# Patient Record
Sex: MALE | Race: WHITE | NOT HISPANIC OR LATINO | Employment: OTHER | URBAN - METROPOLITAN AREA
[De-identification: names, ages, dates, MRNs, and addresses within clinical notes are randomized per-mention and may not be internally consistent; named-entity substitution may affect disease eponyms.]

---

## 2018-08-30 ENCOUNTER — HOSPITAL ENCOUNTER (OUTPATIENT)
Dept: GENERAL RADIOLOGY | Facility: HOSPITAL | Age: 58
Discharge: HOME | End: 2018-08-30

## 2019-05-16 ENCOUNTER — CONVERSION ENCOUNTER (OUTPATIENT)
Dept: BEHAVIORAL HEALTH | Facility: OTHER | Age: 59
End: 2019-05-16

## 2019-05-16 LAB
ALBUMIN SERPL-MCNC: 4.2 G/DL (ref 3.6–5.1)
ALBUMIN/GLOB SERPL: ABNORMAL {RATIO} (ref 1–2.5)
ALP SERPL-CCNC: 78 UNITS/L (ref 40–115)
ALT SERPL-CCNC: 25 UNITS/L (ref 9–46)
AST SERPL-CCNC: 21 UNITS/L (ref 10–35)
BASOPHILS # BLD AUTO: ABNORMAL 10*3/MM3 (ref 0–200)
BASOPHILS NFR BLD AUTO: 1.4 %
BILIRUB SERPL-MCNC: 0.4 MG/DL (ref 0.2–1.2)
BILIRUB UR QL STRIP: NEGATIVE
BUN SERPL-MCNC: 21 MG/DL (ref 7–25)
BUN/CREAT SERPL: ABNORMAL (ref 6–22)
CALCIUM SERPL-MCNC: 9.4 MG/DL (ref 8.6–10.3)
CHLORIDE SERPL-SCNC: 98 MMOL/L (ref 98–110)
CHOLEST SERPL-MCNC: 228 MG/DL
CHOLEST/HDLC SERPL: ABNORMAL {RATIO}
CO2 CONTENT VENOUS: 28 MMOL/L (ref 20–32)
COLOR UR: YELLOW
CONV BACTERIA IN URINE MICRO: ABNORMAL /HPF
CONV HYALINE CASTS IN URINE MICRO: ABNORMAL
CONV NEUTROPHILS/100 LEUKOCYTES IN BODY FLUID BY MANUAL COUNT: 65.7 %
CONV PROTEIN IN URINE BY AUTOMATED TEST STRIP: NEGATIVE
CONV TOTAL PROTEIN: 7 G/DL (ref 6.1–8.1)
CREAT UR-MCNC: 1.3 MG/DL (ref 0.7–1.25)
EOSINOPHIL # BLD AUTO: 3.9 %
EOSINOPHIL # BLD AUTO: ABNORMAL 10*3/MM3 (ref 15–500)
ERYTHROCYTE [DISTWIDTH] IN BLOOD BY AUTOMATED COUNT: 12.8 % (ref 11–15)
GLOBULIN UR ELPH-MCNC: ABNORMAL G/DL (ref 1.9–3.7)
GLUCOSE SERPL-MCNC: 87 MG/DL (ref 65–99)
GLUCOSE UR QL: NEGATIVE G/DL
HCT VFR BLD AUTO: 36.9 % (ref 38.5–50)
HDLC SERPL-MCNC: 66 MG/DL
HGB BLD-MCNC: 12.6 G/DL (ref 13.2–17.1)
HGB UR QL STRIP: ABNORMAL
KETONES UR QL STRIP: NEGATIVE
LDLC SERPL CALC-MCNC: ABNORMAL MG/DL
LEUKOCYTE ESTERASE UR QL STRIP: ABNORMAL
LYMPHOCYTES # BLD AUTO: ABNORMAL 10*3/MM3 (ref 850–3900)
LYMPHOCYTES NFR BLD AUTO: 18.4 %
MCH RBC QN AUTO: 32.8 PG (ref 27–33)
MCHC RBC AUTO-ENTMCNC: ABNORMAL % (ref 32–36)
MCV RBC AUTO: 96.1 FL (ref 80–100)
MONOCYTES # BLD AUTO: ABNORMAL 10*3/MICROLITER (ref 200–950)
MONOCYTES NFR BLD AUTO: 10.6 %
NEUTROPHILS # BLD AUTO: ABNORMAL 10*3/MM3 (ref 1500–7800)
NITRITE UR QL STRIP: NEGATIVE
PH UR STRIP.AUTO: 7 [PH] (ref 5–8)
PHENOBARB SERPL-MCNC: 22.4 MCG/ML (ref 15–40)
PLATELET # BLD AUTO: ABNORMAL 10*3/MM3 (ref 140–400)
PMV BLD AUTO: 10.2 FL (ref 7.5–12.5)
POTASSIUM SERPL-SCNC: 4 MMOL/L (ref 3.5–5.3)
RBC # BLD AUTO: ABNORMAL 10*6/MM3 (ref 4.2–5.8)
RBC #/AREA URNS HPF: ABNORMAL /[HPF]
SODIUM SERPL-SCNC: 134 MMOL/L (ref 135–146)
SP GR UR: 1.01 (ref 1–1.03)
SQUAMOUS #/AREA URNS HPF: ABNORMAL /HPF
TRIGL SERPL-MCNC: 131 MG/DL
WBC # BLD AUTO: ABNORMAL K/UL (ref 3.8–10.8)
WBC #/AREA URNS HPF: ABNORMAL CELLS/HPF

## 2019-07-01 RX ORDER — ACETAMINOPHEN 325 MG/1
TABLET ORAL
Qty: 60 TABLET | Refills: 12 | Status: SHIPPED | OUTPATIENT
Start: 2019-07-01 | End: 2021-02-08

## 2019-07-01 RX ORDER — OMEPRAZOLE 20 MG/1
CAPSULE, DELAYED RELEASE ORAL
Qty: 30 CAPSULE | Refills: 0 | Status: SHIPPED | OUTPATIENT
Start: 2019-07-01

## 2019-07-20 RX ORDER — RANITIDINE HCL 75 MG
TABLET ORAL
Qty: 60 TABLET | Refills: 2 | Status: SHIPPED | OUTPATIENT
Start: 2019-07-20 | End: 2019-10-15 | Stop reason: SDUPTHER

## 2019-07-20 RX ORDER — HYDRALAZINE HYDROCHLORIDE 10 MG/1
TABLET, FILM COATED ORAL
Qty: 90 TABLET | Refills: 2 | Status: SHIPPED | OUTPATIENT
Start: 2019-07-20 | End: 2019-09-25 | Stop reason: SDUPTHER

## 2019-07-20 RX ORDER — LORATADINE 10 MG/1
TABLET ORAL
Qty: 30 TABLET | Refills: 0 | Status: SHIPPED | OUTPATIENT
Start: 2019-07-20 | End: 2019-08-16 | Stop reason: SDUPTHER

## 2019-07-20 RX ORDER — FERROUS SULFATE 325(65) MG
TABLET ORAL
Qty: 60 TABLET | Refills: 0 | Status: SHIPPED | OUTPATIENT
Start: 2019-07-20 | End: 2019-08-16 | Stop reason: SDUPTHER

## 2019-08-05 ENCOUNTER — OFFICE VISIT (OUTPATIENT)
Dept: FAMILY MEDICINE CLINIC | Facility: CLINIC | Age: 59
End: 2019-08-05

## 2019-08-05 VITALS
HEART RATE: 80 BPM | HEIGHT: 68 IN | DIASTOLIC BLOOD PRESSURE: 72 MMHG | BODY MASS INDEX: 22.43 KG/M2 | OXYGEN SATURATION: 96 % | TEMPERATURE: 98 F | SYSTOLIC BLOOD PRESSURE: 116 MMHG | WEIGHT: 148 LBS

## 2019-08-05 DIAGNOSIS — R63.4 WEIGHT LOSS, NON-INTENTIONAL: ICD-10-CM

## 2019-08-05 DIAGNOSIS — D63.1 ANEMIA DUE TO CHRONIC KIDNEY DISEASE, UNSPECIFIED CKD STAGE: ICD-10-CM

## 2019-08-05 DIAGNOSIS — F20.9 SCHIZOPHRENIA, UNSPECIFIED TYPE (HCC): ICD-10-CM

## 2019-08-05 DIAGNOSIS — K27.9 PEPTIC ULCER DISEASE: ICD-10-CM

## 2019-08-05 DIAGNOSIS — R79.89 SERUM CREATININE RAISED: Primary | ICD-10-CM

## 2019-08-05 DIAGNOSIS — N18.9 ANEMIA DUE TO CHRONIC KIDNEY DISEASE, UNSPECIFIED CKD STAGE: ICD-10-CM

## 2019-08-05 DIAGNOSIS — M81.0 OSTEOPOROSIS, UNSPECIFIED OSTEOPOROSIS TYPE, UNSPECIFIED PATHOLOGICAL FRACTURE PRESENCE: ICD-10-CM

## 2019-08-05 DIAGNOSIS — G40.909 SEIZURE DISORDER (HCC): ICD-10-CM

## 2019-08-05 PROBLEM — Z13.220 ENCOUNTER FOR LIPID SCREENING FOR CARDIOVASCULAR DISEASE: Status: ACTIVE | Noted: 2017-11-22

## 2019-08-05 PROBLEM — Z13.6 ENCOUNTER FOR LIPID SCREENING FOR CARDIOVASCULAR DISEASE: Status: ACTIVE | Noted: 2017-11-22

## 2019-08-05 PROBLEM — Z12.5 ENCOUNTER FOR SCREENING FOR MALIGNANT NEOPLASM OF PROSTATE: Status: ACTIVE | Noted: 2017-11-22

## 2019-08-05 PROBLEM — Z51.81 THERAPEUTIC DRUG MONITORING: Status: ACTIVE | Noted: 2019-05-14

## 2019-08-05 PROBLEM — E87.1 HYPONATREMIA: Status: ACTIVE | Noted: 2017-03-15

## 2019-08-05 PROBLEM — Z88.9: Status: ACTIVE | Noted: 2017-07-14

## 2019-08-05 PROBLEM — Z00.00 ENCOUNTER FOR GENERAL ADULT MEDICAL EXAMINATION WITHOUT ABNORMAL FINDINGS: Status: ACTIVE | Noted: 2019-05-14

## 2019-08-05 PROBLEM — E78.00 HIGH CHOLESTEROL: Status: ACTIVE | Noted: 2019-05-29

## 2019-08-05 PROBLEM — D64.9 ANEMIA: Status: ACTIVE | Noted: 2017-03-15

## 2019-08-05 PROCEDURE — 99214 OFFICE O/P EST MOD 30 MIN: CPT | Performed by: NURSE PRACTITIONER

## 2019-08-05 RX ORDER — RISPERIDONE 2 MG/1
1 TABLET ORAL 2 TIMES DAILY
COMMUNITY
Start: 2018-09-18 | End: 2019-11-11 | Stop reason: SDUPTHER

## 2019-08-05 RX ORDER — DOCUSATE SODIUM 100 MG/1
1 CAPSULE, LIQUID FILLED ORAL 2 TIMES DAILY
COMMUNITY
Start: 2018-06-22 | End: 2019-09-13 | Stop reason: SDUPTHER

## 2019-08-05 RX ORDER — ALENDRONATE SODIUM 70 MG/1
1 TABLET ORAL WEEKLY
COMMUNITY
Start: 2019-03-01 | End: 2019-09-13 | Stop reason: SDUPTHER

## 2019-08-05 RX ORDER — BRIMONIDINE TARTRATE AND TIMOLOL MALEATE 2; 5 MG/ML; MG/ML
1 SOLUTION OPHTHALMIC 2 TIMES DAILY
COMMUNITY
Start: 2017-02-24

## 2019-08-05 RX ORDER — PHENOBARBITAL 97.2 MG/1
1 TABLET ORAL DAILY
COMMUNITY
Start: 2014-12-13 | End: 2020-02-04

## 2019-08-05 RX ORDER — ACETAMINOPHEN 325 MG/1
2 TABLET ORAL EVERY 4 HOURS PRN
COMMUNITY
Start: 2011-11-07 | End: 2022-12-13

## 2019-08-05 RX ORDER — OXCARBAZEPINE 600 MG/1
TABLET, FILM COATED ORAL
COMMUNITY
Start: 2019-01-05 | End: 2020-01-07

## 2019-08-05 NOTE — PROGRESS NOTES
Subjective   Manuel Gaspar is a 59 y.o. male.     59-year-old white male recounts male who lives in a group home with a history of hypertension, kidney stones, chronic anemia, neurogenic bladder, hearing loss with hearing aids, chronic hyponatremia, chronic UTIs and seizure disorder who comes in today for 3-month follow-up visit.  Patient has no real issues and has been doing well.  Blood pressure 116/72 heart rate 80 he denies any chest pain, dyspnea, tachycardia, dizziness or edema  Weight is 148 which is down 3 lb and we are going to be adding shakes to his diet since he is so active. Patient is facilities  Deny any seizure activity air has been good  Patient is on Fosamax and calcium vitamin D but has not had a bone scan in over 10 years and we are going to order this  Last hemoglobin was 12.6 and last creatinine was 1.3 we are checking both today    CBC and BMP today  DEXA scan         The following portions of the patient's history were reviewed and updated as appropriate: allergies, current medications, past family history, past medical history, past social history, past surgical history and problem list.    Review of Systems   Constitutional: Negative.    HENT: Negative.    Respiratory: Negative.    Cardiovascular: Negative.    Gastrointestinal: Negative.    Genitourinary: Negative.    Musculoskeletal: Negative.    Skin: Negative.    Neurological: Negative.    Psychiatric/Behavioral: Negative.        Objective   Physical Exam   Constitutional: He appears well-developed and well-nourished.   Eyes: EOM are normal.   Cardiovascular: Normal rate and regular rhythm.   Pulmonary/Chest: Effort normal and breath sounds normal.   Abdominal: Soft. Bowel sounds are normal.   Musculoskeletal: Normal range of motion.   Neurological: He is alert.   Skin: Skin is warm and dry.   Psychiatric: He has a normal mood and affect.         Assessment/Plan   Manuel was seen today for anemia and hypertension.    Diagnoses and all orders  for this visit:    Serum creatinine raised  -     Basic Metabolic Panel    Anemia due to chronic kidney disease, unspecified CKD stage  -     CBC (No Diff)    Peptic ulcer disease    Seizure disorder (CMS/HCC)    Schizophrenia, unspecified type (CMS/[facility])    Weight loss, non-intentional    Osteoporosis, unspecified osteoporosis type, unspecified pathological fracture presence  -     DEXA Bone Density Axial; Future

## 2019-08-06 LAB
BUN SERPL-MCNC: 18 MG/DL (ref 8–27)
BUN/CREAT SERPL: 13 (ref 10–24)
CALCIUM SERPL-MCNC: 9.4 MG/DL (ref 8.6–10.2)
CHLORIDE SERPL-SCNC: 100 MMOL/L (ref 96–106)
CO2 SERPL-SCNC: 25 MMOL/L (ref 20–29)
CREAT SERPL-MCNC: 1.34 MG/DL (ref 0.76–1.27)
ERYTHROCYTE [DISTWIDTH] IN BLOOD BY AUTOMATED COUNT: 13.4 % (ref 12.3–15.4)
GLUCOSE SERPL-MCNC: 56 MG/DL (ref 65–99)
HCT VFR BLD AUTO: 37.2 % (ref 37.5–51)
HGB BLD-MCNC: 12.6 G/DL (ref 13–17.7)
MCH RBC QN AUTO: 32.2 PG (ref 26.6–33)
MCHC RBC AUTO-ENTMCNC: 33.9 G/DL (ref 31.5–35.7)
MCV RBC AUTO: 95 FL (ref 79–97)
PLATELET # BLD AUTO: 186 X10E3/UL (ref 150–450)
POTASSIUM SERPL-SCNC: 4.2 MMOL/L (ref 3.5–5.2)
RBC # BLD AUTO: 3.91 X10E6/UL (ref 4.14–5.8)
SODIUM SERPL-SCNC: 139 MMOL/L (ref 134–144)
WBC # BLD AUTO: 4.4 X10E3/UL (ref 3.4–10.8)

## 2019-08-16 RX ORDER — PHENOBARBITAL 97.2 MG/1
TABLET ORAL
Qty: 30 TABLET | Refills: 5 | OUTPATIENT
Start: 2019-08-16

## 2019-08-17 RX ORDER — LORATADINE 10 MG/1
TABLET ORAL
Qty: 30 TABLET | Refills: 0 | Status: SHIPPED | OUTPATIENT
Start: 2019-08-17 | End: 2019-09-13 | Stop reason: SDUPTHER

## 2019-08-17 RX ORDER — FERROUS SULFATE 325(65) MG
TABLET ORAL
Qty: 60 TABLET | Refills: 0 | Status: SHIPPED | OUTPATIENT
Start: 2019-08-17 | End: 2019-09-13 | Stop reason: SDUPTHER

## 2019-09-15 RX ORDER — LORATADINE 10 MG/1
TABLET ORAL
Qty: 30 TABLET | Refills: 0 | Status: SHIPPED | OUTPATIENT
Start: 2019-09-15 | End: 2019-10-15 | Stop reason: SDUPTHER

## 2019-09-15 RX ORDER — ALENDRONATE SODIUM 70 MG/1
TABLET ORAL
Qty: 4 TABLET | Refills: 5 | Status: SHIPPED | OUTPATIENT
Start: 2019-09-15 | End: 2020-02-29

## 2019-09-15 RX ORDER — FERROUS SULFATE 325(65) MG
TABLET ORAL
Qty: 60 TABLET | Refills: 0 | Status: SHIPPED | OUTPATIENT
Start: 2019-09-15 | End: 2019-10-15 | Stop reason: SDUPTHER

## 2019-09-15 RX ORDER — DOCUSATE SODIUM 100 MG/1
CAPSULE, LIQUID FILLED ORAL
Qty: 60 CAPSULE | Refills: 4 | Status: SHIPPED | OUTPATIENT
Start: 2019-09-15 | End: 2020-02-04

## 2019-09-25 RX ORDER — HYDRALAZINE HYDROCHLORIDE 10 MG/1
10 TABLET, FILM COATED ORAL 2 TIMES DAILY
Qty: 60 TABLET | Refills: 5 | Status: SHIPPED | OUTPATIENT
Start: 2019-09-25 | End: 2020-01-07

## 2019-10-16 RX ORDER — RANITIDINE HCL 75 MG
TABLET ORAL
Qty: 60 TABLET | Refills: 2 | Status: SHIPPED | OUTPATIENT
Start: 2019-10-16 | End: 2019-11-20

## 2019-10-16 RX ORDER — FERROUS SULFATE 325(65) MG
TABLET ORAL
Qty: 60 TABLET | Refills: 0 | Status: SHIPPED | OUTPATIENT
Start: 2019-10-16 | End: 2019-11-12 | Stop reason: SDUPTHER

## 2019-10-16 RX ORDER — LORATADINE 10 MG/1
TABLET ORAL
Qty: 30 TABLET | Refills: 0 | Status: SHIPPED | OUTPATIENT
Start: 2019-10-16 | End: 2019-11-12 | Stop reason: SDUPTHER

## 2019-11-05 DIAGNOSIS — M81.0 OSTEOPOROSIS, UNSPECIFIED OSTEOPOROSIS TYPE, UNSPECIFIED PATHOLOGICAL FRACTURE PRESENCE: ICD-10-CM

## 2019-11-11 ENCOUNTER — OFFICE VISIT (OUTPATIENT)
Dept: PSYCHIATRY | Facility: CLINIC | Age: 59
End: 2019-11-11

## 2019-11-11 DIAGNOSIS — F20.0 PARANOID SCHIZOPHRENIA (HCC): Primary | ICD-10-CM

## 2019-11-11 DIAGNOSIS — F79 INTELLECTUAL DISABILITY: ICD-10-CM

## 2019-11-11 PROCEDURE — 99213 OFFICE O/P EST LOW 20 MIN: CPT | Performed by: PHYSICIAN ASSISTANT

## 2019-11-11 RX ORDER — RISPERIDONE 2 MG/1
2 TABLET ORAL 2 TIMES DAILY
Qty: 60 TABLET | Refills: 5 | Status: SHIPPED | OUTPATIENT
Start: 2019-11-11 | End: 2020-05-24

## 2019-11-11 NOTE — PROGRESS NOTES
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  He hopes to save money so he can go visit [facility] in [facility], [place]      The following portions of the patient's history were reviewed and updated as appropriate: allergies, current medications, past family history, past medical history, past social history, past surgical history and problem list.    PAST PSYCHIATRIC HISTORY  Axis I  Schizophrenia/cognitive disorder  Axis II  Learning Disorder    PAST OUTPATIENT TREATMENT  Diagnosis treated:  Cognitive Disorder  Treatment Type:  Medication Management  Prior Psychiatric Medications:  Risperdal  Support Groups:  None  Sequelae Of Mental Disorder:  emotional distress      Interval History  No Change    Side Effects  None      Past Medical History:  Past Medical History:   Diagnosis Date   • Hearing loss    • Hypertension    • Kidney stone    • Mental retardation    • Neurogenic bladder    • Seizure disorder (CMS/HCC)     onset 16-17 years old, last seizure about 2015 when weaning phenobarb       Social History:  Social History     Socioeconomic History   • Marital status: Single     Spouse name: Not on file   • Number of children: Not on file   • Years of education: Not on file   • Highest education level: Not on file   Tobacco Use   • Smoking status: Never Smoker   • Smokeless tobacco: Never Used   Substance and Sexual Activity   • Alcohol use: No     Frequency: Never   • Drug use: No   • Sexual activity: No       Family History:  Family History   Family history unknown: Yes       Past Surgical History:  Past Surgical History:    Procedure Laterality Date   • BLADDER REPAIR     • CATARACT EXTRACTION     • ORIF PATELLA FRACTURE Left    • THORACOTOMY     • TYMPANOMASTOIDECTOMY         Problem List:  Patient Active Problem List   Diagnosis   • Allergy status to unspecified drugs, medicaments and biological substances status   • Anemia   • Constipation   • Deafness   • Difficult or painful urination   • Dysthymia   • Encounter for lipid screening for cardiovascular disease   • Encounter for screening   • Encounter for screening for malignant neoplasm of prostate   • Encounter for immunization   • Therapeutic drug monitoring   • Encounter for immunization   • Encounter for general adult medical examination without abnormal findings   • Enlarged prostate without lower urinary tract symptoms (luts)   • Generalized anxiety disorder   • Headache   • High cholesterol   • Hypertension, benign   • Hyponatremia   • Insomnia   • Knee pain   • Intellectual disability   • Nephrolithiasis   • Otalgia   • Otitis media   • Partial epilepsy with impairment of consciousness, intractable (CMS/HCC)   • Peptic ulcer disease   • Schizophrenia (CMS/HCC)   • Seizure disorder (CMS/HCC)   • Serum creatinine raised   • Weight loss, non-intentional       Allergy:   Allergies   Allergen Reactions   • Levofloxacin Other (See Comments)   • Sulfamethoxazole-Trimethoprim Other (See Comments)        Discontinued Medications:  Medications Discontinued During This Encounter   Medication Reason   • risperiDONE (RISPERDAL) 2 MG tablet Reorder       Current Medications:   Current Outpatient Medications   Medication Sig Dispense Refill   • acetaminophen (TYLENOL) 325 MG tablet Take 2 tablets by mouth Every 4 (Four) Hours As Needed.     • alendronate (FOSAMAX) 70 MG tablet TAKE 1 TABLET BY MOUTH WEEKLY .TAKE WITH 8OZ WATER BEFORE ANY FOOD OR DRINK. DO NOT L IE DOWN FOR 30 MINUTES 4 tablet 5   • brimonidine-timolol (COMBIGAN) 0.2-0.5 % ophthalmic solution Administer 1 drop to both  eyes 2 (Two) Times a Day.     • calcium carbonate-vitamin d 600-400 MG-UNIT per tablet TAKE ONE (1) TABLET BY MOUTH TWICE A DAY 60 tablet 3   • DOCUSIL 100 MG capsule TAKE ONE (1) CAPSULE BY MOUTH TWICE A DAY 60 capsule 4   • ferrous sulfate 325 (65 FE) MG tablet TAKE ONE TABLET BY MOUTH TWICE DAILY 60 tablet 0   • hydrALAZINE (APRESOLINE) 10 MG tablet Take 1 tablet by mouth 2 (Two) Times a Day. Morning and Evening 60 tablet 5   • loratadine (CLARITIN) 10 MG tablet TAKE ONE TABLET BY MOUTH EVERY DAY 30 tablet 0   • MAPAP 325 MG tablet TAKE 2 TABLETS BY MOUTH EVERY 4 HOURS AS NEEDED FOR FEVER AND PAIN ( MAX 12 DOSES IN 24 HOURS) 60 tablet 12   • Neomycin-Bacitracin-Polymyxin (GNP TRIPLE ANTIBIOTIC) 3.5-400-5000 ointment FOLLOW DIRECTIONS ON LABEL AS NEEDED TO PREVENT INFECTIONS IN MINOR CUTS AND SCRAPES. IF NO IMPROVEMENT IN 48 HOURS NOTIFY NURSE 28.4 g 12   • omeprazole (priLOSEC) 20 MG capsule TAKE 1 CAPSULE BY MOUTH EVERY DAY AS NEEDED ( BUBBLE ) 30 capsule 0   • OXcarbazepine (TRILEPTAL) 600 MG tablet Take 1 tablet by mouth every morning and 2 tablets by mouth every evening     • PHENobarbital (LUMINAL) 97.2 MG tablet Take 1 tablet by mouth Daily.     • Polyethylene Glycol 3350 (MIRALAX PO) Take 17 g by mouth Daily. Mix with water     • Psyllium (METAMUCIL MULTIHEALTH FIBER) 55.46 % powder Take 1 teaspoon(s) by mouth 2 (Two) Times a Day.     • raNITIdine (ZANTAC) 75 MG tablet TAKE ONE TABLET BY MOUTH TWICE DAILY 60 tablet 2   • risperiDONE (RISPERDAL) 2 MG tablet Take 1 tablet by mouth 2 (Two) Times a Day. 60 tablet 5     No current facility-administered medications for this visit.          Review of Symptoms:    Psychiatric/Behavioral: Negative for agitation, behavioral problems, confusion, decreased concentration, dysphoric mood, hallucinations, self-injury, sleep disturbance and suicidal ideas. The patient is not nervous/anxious and is not hyperactive.        Physical Exam:   There were no vitals taken for  this visit.    Mental Status Exam:   Hygiene:   good  Cooperation:  Cooperative  Eye Contact:  Good  Psychomotor Behavior:  Slow  Affect:  Appropriate  Mood: normal  Hopelessness: Denies  Speech:  Normal  Thought Process:  Goal directed  Thought Content:  Normal  Suicidal:  None  Homicidal:  None  Hallucinations:  None  Delusion:  None  Memory:  Deficits  Orientation:  Person, Place, Time and Situation  Reliability:  fair  Insight:  Fair  Judgement:  Fair  Impulse Control:  Good  Physical/Medical Issues:  No        PHQ-9 Depression Screening  Little interest or pleasure in doing things? 0   Feeling down, depressed, or hopeless? 0   Trouble falling or staying asleep, or sleeping too much?     Feeling tired or having little energy?     Poor appetite or overeating?     Feeling bad about yourself - or that you are a failure or have let yourself or your family down?     Trouble concentrating on things, such as reading the newspaper or watching television?     Moving or speaking so slowly that other people could have noticed? Or the opposite - being so fidgety or restless that you have been moving around a lot more than usual?     Thoughts that you would be better off dead, or of hurting yourself in some way?     PHQ-9 Total Score 0   If you checked off any problems, how difficult have these problems made it for you to do your work, take care of things at home, or get along with other people?             Never smoker    I advised Ray of the risks of tobacco use.     Lab Results:   No visits with results within 3 Month(s) from this visit.   Latest known visit with results is:   Office Visit on 08/05/2019   Component Date Value Ref Range Status   • Glucose 08/05/2019 56* 65 - 99 mg/dL Final   • BUN 08/05/2019 18  8 - 27 mg/dL Final   • Creatinine 08/05/2019 1.34* 0.76 - 1.27 mg/dL Final   • eGFR Non African Am 08/05/2019 57* >59 mL/min/1.73 Final   • eGFR African Am 08/05/2019 66  >59 mL/min/1.73 Final   • BUN/Creatinine  Ratio 08/05/2019 13  10 - 24 Final   • Sodium 08/05/2019 139  134 - 144 mmol/L Final   • Potassium 08/05/2019 4.2  3.5 - 5.2 mmol/L Final   • Chloride 08/05/2019 100  96 - 106 mmol/L Final   • Total CO2 08/05/2019 25  20 - 29 mmol/L Final   • Calcium 08/05/2019 9.4  8.6 - 10.2 mg/dL Final   • WBC 08/05/2019 4.4  3.4 - 10.8 x10E3/uL Final   • RBC 08/05/2019 3.91* 4.14 - 5.80 x10E6/uL Final   • Hemoglobin 08/05/2019 12.6* 13.0 - 17.7 g/dL Final   • Hematocrit 08/05/2019 37.2* 37.5 - 51.0 % Final   • MCV 08/05/2019 95  79 - 97 fL Final   • MCH 08/05/2019 32.2  26.6 - 33.0 pg Final   • MCHC 08/05/2019 33.9  31.5 - 35.7 g/dL Final   • RDW 08/05/2019 13.4  12.3 - 15.4 % Final   • Platelets 08/05/2019 186  150 - 450 x10E3/uL Final       Assessment/Plan   Problems Addressed this Visit        Other    Intellectual disability    Relevant Medications    risperiDONE (RISPERDAL) 2 MG tablet    Schizophrenia (CMS/[facility]) - Primary    Relevant Medications    risperiDONE (RISPERDAL) 2 MG tablet          Visit Diagnoses:    ICD-10-CM ICD-9-CM   1. Paranoid schizophrenia (CMS/[facility]) F20.0 295.30   2. Intellectual disability F79 319       TREATMENT PLAN/GOALS: Continue supportive psychotherapy efforts and medications as indicated. Treatment and medication options discussed during today's visit. Patient ackowledged and verbally consented to continue with current treatment plan and was educated on the importance of compliance with treatment and follow-up appointments.    MEDICATION ISSUES:  INSPECT reviewed as expected  Discussed medication options and treatment plan of prescribed medication as well as the risks, benefits, and side effects including potential falls, possible impaired driving and metabolic adversities among others. Patient is agreeable to call the office with any worsening of symptoms or onset of side effects. Patient is agreeable to call 911 or go to the nearest ER should he/she begin having SI/HI. No medication side effects  or related complaints today.     MEDS ORDERED DURING VISIT:  New Medications Ordered This Visit   Medications   • risperiDONE (RISPERDAL) 2 MG tablet     Sig: Take 1 tablet by mouth 2 (Two) Times a Day.     Dispense:  60 tablet     Refill:  5       Return in about 6 months (around 5/11/2020).         This document has been electronically signed by Krista Epstein PA-C  November 11, 2019 10:33 AM

## 2019-11-12 ENCOUNTER — OFFICE VISIT (OUTPATIENT)
Dept: FAMILY MEDICINE CLINIC | Facility: CLINIC | Age: 59
End: 2019-11-12

## 2019-11-12 VITALS
HEIGHT: 68 IN | BODY MASS INDEX: 23.04 KG/M2 | TEMPERATURE: 97.8 F | HEART RATE: 78 BPM | WEIGHT: 152 LBS | SYSTOLIC BLOOD PRESSURE: 106 MMHG | DIASTOLIC BLOOD PRESSURE: 68 MMHG | OXYGEN SATURATION: 98 %

## 2019-11-12 DIAGNOSIS — R79.89 SERUM CREATININE RAISED: ICD-10-CM

## 2019-11-12 DIAGNOSIS — N18.9 ANEMIA DUE TO CHRONIC KIDNEY DISEASE, UNSPECIFIED CKD STAGE: Primary | ICD-10-CM

## 2019-11-12 DIAGNOSIS — R63.4 WEIGHT LOSS, NON-INTENTIONAL: ICD-10-CM

## 2019-11-12 DIAGNOSIS — D63.1 ANEMIA DUE TO CHRONIC KIDNEY DISEASE, UNSPECIFIED CKD STAGE: Primary | ICD-10-CM

## 2019-11-12 PROCEDURE — 99214 OFFICE O/P EST MOD 30 MIN: CPT | Performed by: NURSE PRACTITIONER

## 2019-11-12 RX ORDER — FERROUS SULFATE 325(65) MG
TABLET ORAL
Qty: 60 TABLET | Refills: 0 | Status: SHIPPED | OUTPATIENT
Start: 2019-11-12 | End: 2019-12-10 | Stop reason: SDUPTHER

## 2019-11-12 RX ORDER — LORATADINE 10 MG/1
TABLET ORAL
Qty: 30 TABLET | Refills: 0 | Status: SHIPPED | OUTPATIENT
Start: 2019-11-12 | End: 2019-12-10 | Stop reason: SDUPTHER

## 2019-11-12 NOTE — PROGRESS NOTES
Subjective   Manuel Gaspar is a 59 y.o. male.      59-year-old white male who lives in a group home with a history of hypertension, kidney stones, chronic anemia, neurogenic bladder, hearing loss with hearing aids, chronic hyponatremia, chronic UTIs and seizure disorder who comes in for 3 month follow-up visit today  I will be checking patient's kidney  Function and anemia today his last hemoglobin was 12.6 and creatinine 1.34  The patient has had issues with slight loss eyelid he has gained 4 lb in the last 3 months with a weight of 152  Patient had a DEXA scan since last visit that shows continuing osteoporosis he is very active and is on medication for this  Blood pressure 106/68 heart rate 78 he denies any chest pain, dyspnea, tachycardia, dizziness or edema      BMP/ CBC         The following portions of the patient's history were reviewed and updated as appropriate: allergies, current medications, past family history, past medical history, past social history, past surgical history and problem list.    Review of Systems   HENT: Negative.    Respiratory: Negative.    Gastrointestinal: Negative.    Genitourinary: Negative.    Skin: Negative.    Neurological: Negative.    Psychiatric/Behavioral: Negative.        Objective   Physical Exam   Constitutional: He is oriented to person, place, and time. He appears well-developed and well-nourished.   Cardiovascular: Normal rate and regular rhythm.   Pulmonary/Chest: Effort normal and breath sounds normal.   Abdominal: Soft. Bowel sounds are normal.   Musculoskeletal: Normal range of motion.   Neurological: He is alert and oriented to person, place, and time.   Skin: Skin is warm and dry.   Psychiatric: He has a normal mood and affect.         Assessment/Plan   Manuel was seen today for hypertension.    Diagnoses and all orders for this visit:    Anemia due to chronic kidney disease, unspecified CKD stage  -     CBC (No Diff)    Serum creatinine raised  -     Basic Metabolic  Panel    Weight loss, non-intentional

## 2019-11-12 NOTE — PROGRESS NOTES
I have reviewed the notes, assessments, and/or procedures performed byKrista Epstein, I concur with her/his documentation of Manuel Gaspar.

## 2019-11-13 LAB
BUN SERPL-MCNC: 25 MG/DL (ref 8–27)
BUN/CREAT SERPL: 20 (ref 10–24)
CALCIUM SERPL-MCNC: 9.3 MG/DL (ref 8.6–10.2)
CHLORIDE SERPL-SCNC: 100 MMOL/L (ref 96–106)
CO2 SERPL-SCNC: 24 MMOL/L (ref 20–29)
CREAT SERPL-MCNC: 1.24 MG/DL (ref 0.76–1.27)
ERYTHROCYTE [DISTWIDTH] IN BLOOD BY AUTOMATED COUNT: 13.1 % (ref 12.3–15.4)
GLUCOSE SERPL-MCNC: 55 MG/DL (ref 65–99)
HCT VFR BLD AUTO: 38.4 % (ref 37.5–51)
HGB BLD-MCNC: 12.8 G/DL (ref 13–17.7)
MCH RBC QN AUTO: 33.2 PG (ref 26.6–33)
MCHC RBC AUTO-ENTMCNC: 33.3 G/DL (ref 31.5–35.7)
MCV RBC AUTO: 100 FL (ref 79–97)
PLATELET # BLD AUTO: 185 X10E3/UL (ref 150–450)
POTASSIUM SERPL-SCNC: 4.6 MMOL/L (ref 3.5–5.2)
RBC # BLD AUTO: 3.85 X10E6/UL (ref 4.14–5.8)
SODIUM SERPL-SCNC: 140 MMOL/L (ref 134–144)
WBC # BLD AUTO: 5.8 X10E3/UL (ref 3.4–10.8)

## 2019-11-20 RX ORDER — FAMOTIDINE 20 MG/1
20 TABLET, FILM COATED ORAL 2 TIMES DAILY
Qty: 60 TABLET | Refills: 11 | Status: SHIPPED | OUTPATIENT
Start: 2019-11-20 | End: 2020-10-12

## 2019-12-10 RX ORDER — FERROUS SULFATE 325(65) MG
TABLET ORAL
Qty: 60 TABLET | Refills: 0 | Status: SHIPPED | OUTPATIENT
Start: 2019-12-10 | End: 2020-01-07

## 2019-12-10 RX ORDER — LORATADINE 10 MG/1
TABLET ORAL
Qty: 30 TABLET | Refills: 0 | Status: SHIPPED | OUTPATIENT
Start: 2019-12-10 | End: 2020-01-07

## 2020-01-07 RX ORDER — LORATADINE 10 MG/1
TABLET ORAL
Qty: 30 TABLET | Refills: 0 | Status: SHIPPED | OUTPATIENT
Start: 2020-01-07 | End: 2020-02-04

## 2020-01-07 RX ORDER — FERROUS SULFATE 325(65) MG
TABLET ORAL
Qty: 60 TABLET | Refills: 0 | Status: SHIPPED | OUTPATIENT
Start: 2020-01-07 | End: 2020-02-04

## 2020-01-07 RX ORDER — HYDRALAZINE HYDROCHLORIDE 10 MG/1
TABLET, FILM COATED ORAL
Qty: 60 TABLET | Refills: 5 | Status: SHIPPED | OUTPATIENT
Start: 2020-01-07 | End: 2020-02-10

## 2020-01-07 RX ORDER — OXCARBAZEPINE 600 MG/1
TABLET, FILM COATED ORAL
Qty: 90 TABLET | Refills: 3 | Status: SHIPPED | OUTPATIENT
Start: 2020-01-07 | End: 2020-03-02 | Stop reason: SDUPTHER

## 2020-02-04 DIAGNOSIS — G40.109 SEIZURE, TEMPORAL LOBE (HCC): Primary | ICD-10-CM

## 2020-02-04 RX ORDER — LORATADINE 10 MG/1
TABLET ORAL
Qty: 30 TABLET | Refills: 0 | Status: SHIPPED | OUTPATIENT
Start: 2020-02-04 | End: 2020-03-30

## 2020-02-04 RX ORDER — DOCUSATE SODIUM 100 MG/1
CAPSULE, LIQUID FILLED ORAL
Qty: 60 CAPSULE | Refills: 3 | Status: SHIPPED | OUTPATIENT
Start: 2020-02-04

## 2020-02-04 RX ORDER — PHENOBARBITAL 97.2 MG/1
TABLET ORAL
Qty: 30 TABLET | Refills: 4 | Status: SHIPPED | OUTPATIENT
Start: 2020-02-04 | End: 2020-07-20

## 2020-02-04 RX ORDER — FERROUS SULFATE 325(65) MG
TABLET ORAL
Qty: 60 TABLET | Refills: 0 | Status: SHIPPED | OUTPATIENT
Start: 2020-02-04 | End: 2020-03-30

## 2020-02-10 ENCOUNTER — OFFICE VISIT (OUTPATIENT)
Dept: FAMILY MEDICINE CLINIC | Facility: CLINIC | Age: 60
End: 2020-02-10

## 2020-02-10 VITALS
HEART RATE: 72 BPM | OXYGEN SATURATION: 97 % | WEIGHT: 148 LBS | SYSTOLIC BLOOD PRESSURE: 94 MMHG | BODY MASS INDEX: 22.43 KG/M2 | TEMPERATURE: 98.2 F | DIASTOLIC BLOOD PRESSURE: 59 MMHG | HEIGHT: 68 IN

## 2020-02-10 DIAGNOSIS — R79.89 SERUM CREATININE RAISED: ICD-10-CM

## 2020-02-10 DIAGNOSIS — N18.9 ANEMIA DUE TO CHRONIC KIDNEY DISEASE, UNSPECIFIED CKD STAGE: Primary | ICD-10-CM

## 2020-02-10 DIAGNOSIS — D63.1 ANEMIA DUE TO CHRONIC KIDNEY DISEASE, UNSPECIFIED CKD STAGE: Primary | ICD-10-CM

## 2020-02-10 DIAGNOSIS — F20.0 PARANOID SCHIZOPHRENIA (HCC): ICD-10-CM

## 2020-02-10 DIAGNOSIS — G40.909 SEIZURE DISORDER (HCC): ICD-10-CM

## 2020-02-10 DIAGNOSIS — R63.4 WEIGHT LOSS, NON-INTENTIONAL: ICD-10-CM

## 2020-02-10 PROCEDURE — 99214 OFFICE O/P EST MOD 30 MIN: CPT | Performed by: NURSE PRACTITIONER

## 2020-02-10 RX ORDER — HYDRALAZINE HYDROCHLORIDE 10 MG/1
TABLET, FILM COATED ORAL
Qty: 30 TABLET | Refills: 5 | Status: SHIPPED | OUTPATIENT
Start: 2020-02-10 | End: 2020-08-16

## 2020-02-10 NOTE — PROGRESS NOTES
"    Manuel Gaspar is a 59 y.o. male.     The 59-year-old white male who lives at home with history hypertension, kidney stones, chronic anemia, neurogenic bladder, hearing loss with hearing aids, chronic hyponatremia, chronic UTIs and seizure disorder who comes in for 3 month follow-up visit.  Patient remains anemic with very mild kidney disease and I am doing iron studies today and occult blood since he is on iron twice a day and his hemoglobin does not improve.  Patient continues to lose weight and staff states that he just does not eat much even though he is doing shakes.  Weight today is 152  Patient's blood sugars her chin surround low today it is 94/58 heart rate 72 murmur he denies any chest pain, dyspnea, tachycardia dizziness.  I am decreasing his hydralazine to 10 mg in the a.m. and will monitor blood pressure   behavior is very well controlled and he has had no seizure activity     blood work today  occult blood  Stool S   decrease hydralazine to 10 mg q.a.m.   encourage p.o. Intake way frequently       The following portions of the patient's history were reviewed and updated as appropriate: allergies, current medications, past family history, past medical history, past social history, past surgical history and problem list.    Vitals:    02/10/20 0836   BP: 94/59   BP Location: Right arm   Patient Position: Sitting   Cuff Size: Adult   Pulse: 72   Temp: 98.2 °F (36.8 °C)   TempSrc: Oral   SpO2: 97%   Weight: 67.1 kg (148 lb)   Height: 172.7 cm (68\")     Body mass index is 22.5 kg/m².    Past Medical History:   Diagnosis Date   • Hearing loss    • Hypertension    • Kidney stone    • Mental retardation    • Neurogenic bladder    • Seizure disorder (CMS/HCC)     onset 16-17 years old, last seizure about 2015 when weaning phenobarb     Past Surgical History:   Procedure Laterality Date   • BLADDER REPAIR     • CATARACT EXTRACTION     • ORIF PATELLA FRACTURE Left    • THORACOTOMY     • TYMPANOMASTOIDECTOMY   "     Family History   Family history unknown: Yes     Immunization History   Administered Date(s) Administered   • Flu Vaccine Intradermal Quad 18-64YR 10/05/2018   • Flu Vaccine Quad PF >18YRS 10/06/2015   • Flu Vaccine Quad PF >36MO 11/23/2016, 11/12/2019   • Td 06/13/2014       Office Visit on 11/12/2019   Component Date Value Ref Range Status   • Glucose 11/12/2019 55* 65 - 99 mg/dL Final   • BUN 11/12/2019 25  8 - 27 mg/dL Final   • Creatinine 11/12/2019 1.24  0.76 - 1.27 mg/dL Final   • eGFR Non African Am 11/12/2019 62  >59 mL/min/1.73 Final   • eGFR African Am 11/12/2019 72  >59 mL/min/1.73 Final   • BUN/Creatinine Ratio 11/12/2019 20  10 - 24 Final   • Sodium 11/12/2019 140  134 - 144 mmol/L Final   • Potassium 11/12/2019 4.6  3.5 - 5.2 mmol/L Final   • Chloride 11/12/2019 100  96 - 106 mmol/L Final   • Total CO2 11/12/2019 24  20 - 29 mmol/L Final   • Calcium 11/12/2019 9.3  8.6 - 10.2 mg/dL Final   • WBC 11/12/2019 5.8  3.4 - 10.8 x10E3/uL Final   • RBC 11/12/2019 3.85* 4.14 - 5.80 x10E6/uL Final   • Hemoglobin 11/12/2019 12.8* 13.0 - 17.7 g/dL Final   • Hematocrit 11/12/2019 38.4  37.5 - 51.0 % Final   • MCV 11/12/2019 100* 79 - 97 fL Final   • MCH 11/12/2019 33.2* 26.6 - 33.0 pg Final   • MCHC 11/12/2019 33.3  31.5 - 35.7 g/dL Final   • RDW 11/12/2019 13.1  12.3 - 15.4 % Final   • Platelets 11/12/2019 185  150 - 450 x10E3/uL Final         Review of Systems   Constitutional: Negative.    HENT: Negative.    Respiratory: Negative.    Cardiovascular: Negative.    Gastrointestinal: Negative.    Genitourinary: Negative.    Musculoskeletal: Negative.    Skin: Negative.    Psychiatric/Behavioral: Negative.        Objective   Physical Exam   Constitutional: He appears well-developed and well-nourished.   Cardiovascular: Normal rate and regular rhythm.   Murmur heard.  Pulmonary/Chest: Effort normal and breath sounds normal.   Abdominal: Soft. Bowel sounds are normal.   Musculoskeletal: Normal range of motion.    Neurological: He is alert.   Skin: Skin is warm.   Psychiatric: He has a normal mood and affect.       Procedures    Assessment/Plan   Manuel was seen today for 3 month follow up.    Diagnoses and all orders for this visit:    Anemia due to chronic kidney disease, unspecified CKD stage  -     Basic Metabolic Panel  -     Iron and TIBC  -     Vitamin B12  -     Ferritin  -     CBC & Differential    Paranoid schizophrenia (CMS/HCC)    Seizure disorder (CMS/HCC)    Weight loss, non-intentional    Serum creatinine raised    Other orders  -     hydrALAZINE (APRESOLINE) 10 MG tablet; One tab in am, monitor b/p          Current Outpatient Medications:   •  acetaminophen (TYLENOL) 325 MG tablet, Take 2 tablets by mouth Every 4 (Four) Hours As Needed., Disp: , Rfl:   •  alendronate (FOSAMAX) 70 MG tablet, TAKE 1 TABLET BY MOUTH WEEKLY .TAKE WITH 8OZ WATER BEFORE ANY FOOD OR DRINK. DO NOT L IE DOWN FOR 30 MINUTES, Disp: 4 tablet, Rfl: 5  •  brimonidine-timolol (COMBIGAN) 0.2-0.5 % ophthalmic solution, Administer 1 drop to both eyes 2 (Two) Times a Day., Disp: , Rfl:   •  calcium carbonate-vitamin d 600-400 MG-UNIT per tablet, TAKE ONE (1) TABLET BY MOUTH TWICE A DAY, Disp: 60 tablet, Rfl: 3  •  DOCUSIL 100 MG capsule, TAKE ONE (1) CAPSULE BY MOUTH TWICE A DAY, Disp: 60 capsule, Rfl: 3  •  famotidine (PEPCID) 20 MG tablet, Take 1 tablet by mouth 2 (Two) Times a Day., Disp: 60 tablet, Rfl: 11  •  ferrous sulfate 325 (65 FE) MG tablet, TAKE ONE TABLET BY MOUTH TWICE DAILY, Disp: 60 tablet, Rfl: 0  •  hydrALAZINE (APRESOLINE) 10 MG tablet, One tab in am, monitor b/p, Disp: 30 tablet, Rfl: 5  •  loratadine (CLARITIN) 10 MG tablet, TAKE ONE TABLET BY MOUTH EVERY DAY, Disp: 30 tablet, Rfl: 0  •  MAPAP 325 MG tablet, TAKE 2 TABLETS BY MOUTH EVERY 4 HOURS AS NEEDED FOR FEVER AND PAIN ( MAX 12 DOSES IN 24 HOURS), Disp: 60 tablet, Rfl: 12  •  Neomycin-Bacitracin-Polymyxin (GNP TRIPLE ANTIBIOTIC) 3.5-400-5000 ointment, FOLLOW DIRECTIONS  ON LABEL AS NEEDED TO PREVENT INFECTIONS IN MINOR CUTS AND SCRAPES. IF NO IMPROVEMENT IN 48 HOURS NOTIFY NURSE, Disp: 28.4 g, Rfl: 12  •  omeprazole (priLOSEC) 20 MG capsule, TAKE 1 CAPSULE BY MOUTH EVERY DAY AS NEEDED ( BUBBLE ), Disp: 30 capsule, Rfl: 0  •  OXcarbazepine (TRILEPTAL) 600 MG tablet, TAKE 1 TABLET BY MOUTH EVERY MORNING AND 2 TABLETS EVERY EVENING, Disp: 90 tablet, Rfl: 3  •  PHENobarbital (LUMINAL) 97.2 MG tablet, 1T PO QHS * NARC SHEET*, Disp: 30 tablet, Rfl: 4  •  Polyethylene Glycol 3350 (MIRALAX PO), Take 17 g by mouth Daily. Mix with water, Disp: , Rfl:   •  Psyllium (METAMUCIL MULTIHEALTH FIBER) 55.46 % powder, Take 1 teaspoon(s) by mouth 2 (Two) Times a Day., Disp: , Rfl:   •  risperiDONE (RISPERDAL) 2 MG tablet, Take 1 tablet by mouth 2 (Two) Times a Day., Disp: 60 tablet, Rfl: 5

## 2020-02-10 NOTE — PATIENT INSTRUCTIONS
Blood work today   encourage patient to eat and use health Lyrica   decrease hydralazine to 10 mg every a.m. And monitor blood pressure

## 2020-02-11 ENCOUNTER — CLINICAL SUPPORT (OUTPATIENT)
Dept: FAMILY MEDICINE CLINIC | Facility: CLINIC | Age: 60
End: 2020-02-11

## 2020-02-11 DIAGNOSIS — N18.9 ANEMIA DUE TO CHRONIC KIDNEY DISEASE, UNSPECIFIED CKD STAGE: Primary | ICD-10-CM

## 2020-02-11 DIAGNOSIS — D63.1 ANEMIA DUE TO CHRONIC KIDNEY DISEASE, UNSPECIFIED CKD STAGE: Primary | ICD-10-CM

## 2020-02-11 LAB
BASOPHILS # BLD AUTO: 0.1 X10E3/UL (ref 0–0.2)
BASOPHILS NFR BLD AUTO: 1 %
BUN SERPL-MCNC: 26 MG/DL (ref 8–27)
BUN/CREAT SERPL: 22 (ref 10–24)
CALCIUM SERPL-MCNC: 9.4 MG/DL (ref 8.6–10.2)
CHLORIDE SERPL-SCNC: 101 MMOL/L (ref 96–106)
CO2 SERPL-SCNC: 24 MMOL/L (ref 20–29)
CREAT SERPL-MCNC: 1.17 MG/DL (ref 0.76–1.27)
DEVELOPER EXPIRATION DATE: NORMAL
DEVELOPER LOT NUMBER: NORMAL
EOSINOPHIL # BLD AUTO: 0.2 X10E3/UL (ref 0–0.4)
EOSINOPHIL NFR BLD AUTO: 3 %
ERYTHROCYTE [DISTWIDTH] IN BLOOD BY AUTOMATED COUNT: 12.1 % (ref 11.6–15.4)
EXPIRATION DATE: NORMAL
FECAL OCCULT BLOOD SCREEN, POC: NEGATIVE
FERRITIN SERPL-MCNC: 205 NG/ML (ref 30–400)
GLUCOSE SERPL-MCNC: 85 MG/DL (ref 65–99)
HCT VFR BLD AUTO: 40 % (ref 37.5–51)
HGB BLD-MCNC: 12.8 G/DL (ref 13–17.7)
IMM GRANULOCYTES # BLD AUTO: 0 X10E3/UL (ref 0–0.1)
IMM GRANULOCYTES NFR BLD AUTO: 0 %
IRON SATN MFR SERPL: 44 % (ref 15–55)
IRON SERPL-MCNC: 107 UG/DL (ref 38–169)
LYMPHOCYTES # BLD AUTO: 1 X10E3/UL (ref 0.7–3.1)
LYMPHOCYTES NFR BLD AUTO: 13 %
Lab: NORMAL
MCH RBC QN AUTO: 31.7 PG (ref 26.6–33)
MCHC RBC AUTO-ENTMCNC: 32 G/DL (ref 31.5–35.7)
MCV RBC AUTO: 99 FL (ref 79–97)
MONOCYTES # BLD AUTO: 0.8 X10E3/UL (ref 0.1–0.9)
MONOCYTES NFR BLD AUTO: 10 %
NEGATIVE CONTROL: NEGATIVE
NEUTROPHILS # BLD AUTO: 5.8 X10E3/UL (ref 1.4–7)
NEUTROPHILS NFR BLD AUTO: 73 %
PLATELET # BLD AUTO: 202 X10E3/UL (ref 150–450)
POSITIVE CONTROL: POSITIVE
POTASSIUM SERPL-SCNC: 4.2 MMOL/L (ref 3.5–5.2)
RBC # BLD AUTO: 4.04 X10E6/UL (ref 4.14–5.8)
SODIUM SERPL-SCNC: 141 MMOL/L (ref 134–144)
TIBC SERPL-MCNC: 242 UG/DL (ref 250–450)
UIBC SERPL-MCNC: 135 UG/DL (ref 111–343)
VIT B12 SERPL-MCNC: 771 PG/ML (ref 232–1245)
WBC # BLD AUTO: 8 X10E3/UL (ref 3.4–10.8)

## 2020-02-11 PROCEDURE — 82270 OCCULT BLOOD FECES: CPT | Performed by: NURSE PRACTITIONER

## 2020-02-29 RX ORDER — ALENDRONATE SODIUM 70 MG/1
TABLET ORAL
Qty: 4 TABLET | Refills: 4 | Status: SHIPPED | OUTPATIENT
Start: 2020-02-29 | End: 2020-07-18

## 2020-03-02 ENCOUNTER — OFFICE VISIT (OUTPATIENT)
Dept: NEUROLOGY | Facility: CLINIC | Age: 60
End: 2020-03-02

## 2020-03-02 VITALS
DIASTOLIC BLOOD PRESSURE: 80 MMHG | HEIGHT: 68 IN | BODY MASS INDEX: 22.7 KG/M2 | WEIGHT: 149.8 LBS | SYSTOLIC BLOOD PRESSURE: 128 MMHG | HEART RATE: 76 BPM

## 2020-03-02 DIAGNOSIS — G40.219 PARTIAL EPILEPSY WITH IMPAIRMENT OF CONSCIOUSNESS, INTRACTABLE (HCC): Primary | ICD-10-CM

## 2020-03-02 DIAGNOSIS — G40.109 SEIZURE, TEMPORAL LOBE (HCC): ICD-10-CM

## 2020-03-02 PROCEDURE — 99212 OFFICE O/P EST SF 10 MIN: CPT | Performed by: PSYCHIATRY & NEUROLOGY

## 2020-03-02 RX ORDER — OXCARBAZEPINE 600 MG/1
TABLET, FILM COATED ORAL
Qty: 90 TABLET | Refills: 11 | Status: SHIPPED | OUTPATIENT
Start: 2020-03-02 | End: 2021-02-25

## 2020-03-02 NOTE — PROGRESS NOTES
Subjective: Localization-related (focal) (partial) epilepsy    Patient ID: Manuel Gaspar is a 59 y.o. male.    History of Present Illness, yearly f/u   Chief complaint, seizure disorder.   Localization-related (focal) (partial) epilepsy, no seizures doing well with Phenobarbital 97.2 mg 1 qd and Oxcarbazepine 600  Mg 1 tab in the am and 2 tabs at hs.   NA level about 141 last month, wbc normal  No seizures in years  The following portions of the patient's history were reviewed and updated as appropriate: allergies, current medications, past family history, past medical history, past social history, past surgical history and problem list.    Family History   Family history unknown: Yes       Past Medical History:   Diagnosis Date   • Hearing loss    • Hypertension    • Kidney stone    • Mental retardation    • Neurogenic bladder    • Seizure disorder (CMS/HCC)     onset 16-17 years old, last seizure about 2015 when weaning phenobarb       Social History     Socioeconomic History   • Marital status: Single     Spouse name: Not on file   • Number of children: Not on file   • Years of education: Not on file   • Highest education level: Not on file   Tobacco Use   • Smoking status: Never Smoker   • Smokeless tobacco: Never Used   Substance and Sexual Activity   • Alcohol use: No     Frequency: Never   • Drug use: No   • Sexual activity: Never         Current Outpatient Medications:   •  acetaminophen (TYLENOL) 325 MG tablet, Take 2 tablets by mouth Every 4 (Four) Hours As Needed., Disp: , Rfl:   •  alendronate (FOSAMAX) 70 MG tablet, TAKE 1 TABLET BY MOUTH WEEKLY .TAKE WITH 8OZ WATER BEFORE ANY FOOD OR DRINK. DO NOT L IE DOWN FOR 30 MINUTES, Disp: 4 tablet, Rfl: 4  •  brimonidine-timolol (COMBIGAN) 0.2-0.5 % ophthalmic solution, Administer 1 drop to both eyes 2 (Two) Times a Day., Disp: , Rfl:   •  calcium carbonate-vitamin d 600-400 MG-UNIT per tablet, TAKE ONE (1) TABLET BY MOUTH TWICE A DAY, Disp: 60 tablet, Rfl: 3  •   DOCUSIL 100 MG capsule, TAKE ONE (1) CAPSULE BY MOUTH TWICE A DAY, Disp: 60 capsule, Rfl: 3  •  famotidine (PEPCID) 20 MG tablet, Take 1 tablet by mouth 2 (Two) Times a Day., Disp: 60 tablet, Rfl: 11  •  ferrous sulfate 325 (65 FE) MG tablet, TAKE ONE TABLET BY MOUTH TWICE DAILY, Disp: 60 tablet, Rfl: 0  •  hydrALAZINE (APRESOLINE) 10 MG tablet, One tab in am, monitor b/p, Disp: 30 tablet, Rfl: 5  •  loratadine (CLARITIN) 10 MG tablet, TAKE ONE TABLET BY MOUTH EVERY DAY, Disp: 30 tablet, Rfl: 0  •  MAPAP 325 MG tablet, TAKE 2 TABLETS BY MOUTH EVERY 4 HOURS AS NEEDED FOR FEVER AND PAIN ( MAX 12 DOSES IN 24 HOURS), Disp: 60 tablet, Rfl: 12  •  Neomycin-Bacitracin-Polymyxin (GNP TRIPLE ANTIBIOTIC) 3.5-400-5000 ointment, FOLLOW DIRECTIONS ON LABEL AS NEEDED TO PREVENT INFECTIONS IN MINOR CUTS AND SCRAPES. IF NO IMPROVEMENT IN 48 HOURS NOTIFY NURSE, Disp: 28.4 g, Rfl: 12  •  omeprazole (priLOSEC) 20 MG capsule, TAKE 1 CAPSULE BY MOUTH EVERY DAY AS NEEDED ( BUBBLE ), Disp: 30 capsule, Rfl: 0  •  OXcarbazepine (TRILEPTAL) 600 MG tablet, TAKE 1 TABLET BY MOUTH EVERY MORNING AND 2 TABLETS EVERY EVENING, Disp: 90 tablet, Rfl: 3  •  PHENobarbital (LUMINAL) 97.2 MG tablet, 1T PO QHS * NARC SHEET*, Disp: 30 tablet, Rfl: 4  •  Polyethylene Glycol 3350 (MIRALAX PO), Take 17 g by mouth Daily. Mix with water, Disp: , Rfl:   •  Psyllium (METAMUCIL MULTIHEALTH FIBER) 55.46 % powder, Take 1 teaspoon(s) by mouth 2 (Two) Times a Day., Disp: , Rfl:   •  risperiDONE (RISPERDAL) 2 MG tablet, Take 1 tablet by mouth 2 (Two) Times a Day., Disp: 60 tablet, Rfl: 5    Review of Systems   Constitutional: Negative for appetite change and fatigue.   HENT: Positive for hearing loss. Negative for sinus pressure and sinus pain.    Eyes: Negative for pain and itching.   Respiratory: Negative for cough and shortness of breath.    Gastrointestinal: Positive for constipation. Negative for diarrhea.   Endocrine: Negative for cold intolerance and heat  intolerance.   Genitourinary: Negative for difficulty urinating and frequency.   Musculoskeletal: Negative for back pain and neck pain.   Allergic/Immunologic: Positive for environmental allergies. Negative for food allergies.   Neurological: Negative for dizziness, tremors, seizures, syncope, facial asymmetry, speech difficulty, weakness, light-headedness, numbness and headaches.   Psychiatric/Behavioral: Negative for agitation and confusion.          I have reviewed ROS completed by medical assistant.     Objective:    Neurologic Exam     Mental Status   Oriented to person, place, and time.   Level of consciousness: alert    Gait, Coordination, and Reflexes     Gait  Gait: normal      Physical Exam   Constitutional: He is oriented to person, place, and time.   Neurological: He is oriented to person, place, and time. Gait normal.       Assessment/Plan:    There are no diagnoses linked to this encounter.        This document has been electronically signed by Joseph Seipel, MD on March 2, 2020 1:00 PM

## 2020-03-30 RX ORDER — FERROUS SULFATE 325(65) MG
TABLET ORAL
Qty: 60 TABLET | Refills: 0 | Status: SHIPPED | OUTPATIENT
Start: 2020-03-30 | End: 2020-04-24

## 2020-03-30 RX ORDER — LORATADINE 10 MG/1
TABLET ORAL
Qty: 30 TABLET | Refills: 0 | Status: SHIPPED | OUTPATIENT
Start: 2020-03-30 | End: 2020-04-24

## 2020-04-24 RX ORDER — LORATADINE 10 MG/1
TABLET ORAL
Qty: 30 TABLET | Refills: 0 | Status: SHIPPED | OUTPATIENT
Start: 2020-04-24 | End: 2020-05-25

## 2020-04-24 RX ORDER — FERROUS SULFATE 325(65) MG
TABLET ORAL
Qty: 60 TABLET | Refills: 0 | Status: SHIPPED | OUTPATIENT
Start: 2020-04-24 | End: 2020-05-25

## 2020-05-11 ENCOUNTER — OFFICE VISIT (OUTPATIENT)
Dept: PSYCHIATRY | Facility: CLINIC | Age: 60
End: 2020-05-11

## 2020-05-11 DIAGNOSIS — F79 INTELLECTUAL DISABILITY: ICD-10-CM

## 2020-05-11 DIAGNOSIS — F20.0 PARANOID SCHIZOPHRENIA (HCC): Primary | ICD-10-CM

## 2020-05-11 PROCEDURE — 99442 PR PHYS/QHP TELEPHONE EVALUATION 11-20 MIN: CPT | Performed by: PHYSICIAN ASSISTANT

## 2020-05-11 NOTE — PROGRESS NOTES
"Rosa Maria Gaspar is a 59 y.o.white male with MR who presents today for follow up     You have chosen to receive care through a telephone visit. Do you consent to use a telephone visit for your medical care today? 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  She says he has done \"surprisingly well\" with the isolation during COVID  No AVH  Denies anxiety or depression  No agitation or aggression  No SI/HI  Medications are fine, no need for changes.     The following portions of the patient's history were reviewed and updated as appropriate: allergies, current medications, past family history, past medical history, past social history, past surgical history and problem list.    PAST PSYCHIATRIC HISTORY  Axis I  Schizophrenia/cognitive disorder  Axis II  Learning Disorder    PAST OUTPATIENT TREATMENT  Diagnosis treated:  Cognitive Disorder  Treatment Type:  Medication Management  Prior Psychiatric Medications:  Risperdal  Trileptail  Support Groups:  None  Sequelae Of Mental Disorder:  emotional distress      Interval History  No Change    Side Effects  None    Past Psych History was reviewed and compared to 11/11/19 visit and appropriate updates were made.    Past Medical History:  Past Medical History:   Diagnosis Date   • Hearing loss    • Hypertension    • Kidney stone    • Mental retardation    • Neurogenic bladder    • Seizure disorder (CMS/HCC)     onset 16-17 years old, last seizure about 2015 when weaning phenobarb       Social History:  Social History     Socioeconomic History   • Marital status: Single     Spouse name: Not on file   • Number of children: Not on file   • Years of education: Not on file   • Highest education level: Not on file   Tobacco Use   • Smoking status: Never Smoker   • Smokeless tobacco: " Never Used   Substance and Sexual Activity   • Alcohol use: No     Frequency: Never   • Drug use: No   • Sexual activity: Never       Family History:  Family History   Family history unknown: Yes       Past Surgical History:  Past Surgical History:   Procedure Laterality Date   • BLADDER REPAIR     • CATARACT EXTRACTION     • ORIF PATELLA FRACTURE Left    • THORACOTOMY     • TYMPANOMASTOIDECTOMY         Problem List:  Patient Active Problem List   Diagnosis   • Allergy status to unspecified drugs, medicaments and biological substances status   • Anemia   • Constipation   • Deafness   • Difficult or painful urination   • Dysthymia   • Encounter for lipid screening for cardiovascular disease   • Encounter for screening   • Encounter for screening for malignant neoplasm of prostate   • Encounter for immunization   • Therapeutic drug monitoring   • Encounter for immunization   • Encounter for general adult medical examination without abnormal findings   • Enlarged prostate without lower urinary tract symptoms (luts)   • Generalized anxiety disorder   • Headache   • High cholesterol   • Hypertension, benign   • Hyponatremia   • Insomnia   • Knee pain   • Intellectual disability   • Nephrolithiasis   • Otalgia   • Otitis media   • Partial epilepsy with impairment of consciousness, intractable (CMS/HCC)   • Peptic ulcer disease   • Schizophrenia (CMS/HCC)   • Seizure disorder (CMS/HCC)   • Serum creatinine raised   • Weight loss, non-intentional       Allergy:   Allergies   Allergen Reactions   • Levofloxacin Other (See Comments)   • Sulfamethoxazole-Trimethoprim Other (See Comments)        Discontinued Medications:  There are no discontinued medications.    Current Medications:   Current Outpatient Medications   Medication Sig Dispense Refill   • acetaminophen (TYLENOL) 325 MG tablet Take 2 tablets by mouth Every 4 (Four) Hours As Needed.     • alendronate (FOSAMAX) 70 MG tablet TAKE 1 TABLET BY MOUTH WEEKLY .TAKE WITH  8OZ WATER BEFORE ANY FOOD OR DRINK. DO NOT L IE DOWN FOR 30 MINUTES 4 tablet 4   • brimonidine-timolol (COMBIGAN) 0.2-0.5 % ophthalmic solution Administer 1 drop to both eyes 2 (Two) Times a Day.     • calcium carbonate-vitamin d 600-400 MG-UNIT per tablet TAKE ONE (1) TABLET BY MOUTH TWICE A DAY 60 tablet 2   • DOCUSIL 100 MG capsule TAKE ONE (1) CAPSULE BY MOUTH TWICE A DAY 60 capsule 3   • famotidine (PEPCID) 20 MG tablet Take 1 tablet by mouth 2 (Two) Times a Day. 60 tablet 11   • ferrous sulfate 325 (65 FE) MG tablet TAKE ONE TABLET BY MOUTH TWICE DAILY 60 tablet 0   • hydrALAZINE (APRESOLINE) 10 MG tablet One tab in am, monitor b/p 30 tablet 5   • loratadine (CLARITIN) 10 MG tablet TAKE ONE TABLET BY MOUTH EVERY DAY 30 tablet 0   • MAPAP 325 MG tablet TAKE 2 TABLETS BY MOUTH EVERY 4 HOURS AS NEEDED FOR FEVER AND PAIN ( MAX 12 DOSES IN 24 HOURS) 60 tablet 12   • Neomycin-Bacitracin-Polymyxin (GNP TRIPLE ANTIBIOTIC) 3.5-400-5000 ointment FOLLOW DIRECTIONS ON LABEL AS NEEDED TO PREVENT INFECTIONS IN MINOR CUTS AND SCRAPES. IF NO IMPROVEMENT IN 48 HOURS NOTIFY NURSE 28.4 g 12   • omeprazole (priLOSEC) 20 MG capsule TAKE 1 CAPSULE BY MOUTH EVERY DAY AS NEEDED ( BUBBLE ) 30 capsule 0   • OXcarbazepine (TRILEPTAL) 600 MG tablet One tab in am and two at hs 90 tablet 11   • PHENobarbital (LUMINAL) 97.2 MG tablet 1T PO QHS * NARC SHEET* 30 tablet 4   • Polyethylene Glycol 3350 (MIRALAX PO) Take 17 g by mouth Daily. Mix with water     • Psyllium (METAMUCIL MULTIHEALTH FIBER) 55.46 % powder Take 1 teaspoon(s) by mouth 2 (Two) Times a Day.     • risperiDONE (RISPERDAL) 2 MG tablet Take 1 tablet by mouth 2 (Two) Times a Day. 60 tablet 5     No current facility-administered medications for this visit.          Review of Symptoms:    Psychiatric/Behavioral: Negative for agitation, behavioral problems, confusion, decreased concentration, dysphoric mood, hallucinations, self-injury, sleep disturbance and suicidal ideas. The  patient is not nervous/anxious and is not hyperactive.        Physical Exam:   There were no vitals taken for this visit.    Mental Status Exam:   Hygiene:   Unable to assess via telephone  Cooperation:  Cooperative  Eye Contact:  No eye contact via telephone  Psychomotor Behavior:  Slow  Affect:  Appropriate  Mood: normal  Hopelessness: Denies  Speech:  Normal  Thought Process:  Goal directed  Thought Content:  Normal  Suicidal:  None  Homicidal:  None  Hallucinations:  None  Delusion:  None  Memory:  Deficits  Orientation:  Person, Place, Time and Situation  Reliability:  fair  Insight:  Fair  Judgement:  Fair  Impulse Control:  Good  Physical/Medical Issues:  No      Mental Status Exam was reviewed and compared to 11/11/19 visit and appropriate updates were made.     PHQ-9 Depression Screening  Little interest or pleasure in doing things? 0   Feeling down, depressed, or hopeless? 0   Trouble falling or staying asleep, or sleeping too much?     Feeling tired or having little energy?     Poor appetite or overeating?     Feeling bad about yourself - or that you are a failure or have let yourself or your family down?     Trouble concentrating on things, such as reading the newspaper or watching television?     Moving or speaking so slowly that other people could have noticed? Or the opposite - being so fidgety or restless that you have been moving around a lot more than usual?     Thoughts that you would be better off dead, or of hurting yourself in some way?     PHQ-9 Total Score 0   If you checked off any problems, how difficult have these problems made it for you to do your work, take care of things at home, or get along with other people?             Never smoker    I advised Ray of the risks of tobacco use.     Lab Results:   No visits with results within 3 Month(s) from this visit.   Latest known visit with results is:   Clinical Support on 02/11/2020   Component Date Value Ref Range Status   • Fecal Occult  Blood 02/11/2020 Negative  Negative Final   • Lot Number 02/11/2020 [id number]   Final   • Expiration Date 02/11/2020 09/30/2020   Final   • DEVELOPER LOT NUMBER 02/11/2020 [id number]   Final   • DEVELOPER EXPIRATION DATE 02/11/2020 09/30/2020   Final   • Positive Control 02/11/2020 Positive  Positive Final   • Negative Control 02/11/2020 Negative  Negative Final       Assessment/Plan   Problems Addressed this Visit        Other    Intellectual disability    Schizophrenia (CMS/[facility]) - Primary          Visit Diagnoses:    ICD-10-CM ICD-9-CM   1. Paranoid schizophrenia (CMS/[facility]) F20.0 295.30   2. Intellectual disability F79 319       TREATMENT PLAN/GOALS: Continue supportive psychotherapy efforts and medications as indicated. Treatment and medication options discussed during today's visit. Patient ackowledged and verbally consented to continue with current treatment plan and was educated on the importance of compliance with treatment and follow-up appointments.    MEDICATION ISSUES:  INSPECT reviewed as expected  Discussed medication options and treatment plan of prescribed medication as well as the risks, benefits, and side effects including potential falls, possible impaired driving and metabolic adversities among others. Patient is agreeable to call the office with any worsening of symptoms or onset of side effects. Patient is agreeable to call 911 or go to the nearest ER should he/she begin having SI/HI. No medication side effects or related complaints today.     Patient continues to do well on Risperdal, no changes needed, no concerns with his behavior or symptoms.  [facility] pharmacy will contact when refill needed.    MEDS ORDERED DURING VISIT:  No orders of the defined types were placed in this encounter.      Return in about 6 months (around 11/11/2020).    This visit has been rescheduled as a phone visit to comply with patient safety concerns in accordance with CDC recommendations. Total time of discussion was 15  minutes.      This document has been electronically signed by Krista Epstein PA-C  May 11, 2020 11:40

## 2020-05-13 ENCOUNTER — OFFICE VISIT (OUTPATIENT)
Dept: FAMILY MEDICINE CLINIC | Facility: CLINIC | Age: 60
End: 2020-05-13

## 2020-05-13 VITALS
HEIGHT: 68 IN | BODY MASS INDEX: 22.43 KG/M2 | TEMPERATURE: 97.1 F | HEART RATE: 76 BPM | SYSTOLIC BLOOD PRESSURE: 103 MMHG | DIASTOLIC BLOOD PRESSURE: 69 MMHG | OXYGEN SATURATION: 98 % | WEIGHT: 148 LBS

## 2020-05-13 DIAGNOSIS — R63.4 WEIGHT LOSS, NON-INTENTIONAL: ICD-10-CM

## 2020-05-13 DIAGNOSIS — E87.1 HYPONATREMIA: ICD-10-CM

## 2020-05-13 DIAGNOSIS — N18.9 ANEMIA DUE TO CHRONIC KIDNEY DISEASE, UNSPECIFIED CKD STAGE: ICD-10-CM

## 2020-05-13 DIAGNOSIS — I10 HYPERTENSION, BENIGN: ICD-10-CM

## 2020-05-13 DIAGNOSIS — Z00.00 PREVENTATIVE HEALTH CARE: ICD-10-CM

## 2020-05-13 DIAGNOSIS — D63.1 ANEMIA DUE TO CHRONIC KIDNEY DISEASE, UNSPECIFIED CKD STAGE: ICD-10-CM

## 2020-05-13 DIAGNOSIS — R79.89 SERUM CREATININE RAISED: ICD-10-CM

## 2020-05-13 DIAGNOSIS — E78.00 HIGH CHOLESTEROL: Primary | ICD-10-CM

## 2020-05-13 PROCEDURE — 99214 OFFICE O/P EST MOD 30 MIN: CPT | Performed by: NURSE PRACTITIONER

## 2020-05-13 RX ORDER — POLYETHYLENE GLYCOL 3350 17 G/17G
17 POWDER, FOR SOLUTION ORAL DAILY
Qty: 1 EACH | Refills: 2 | Status: SHIPPED | OUTPATIENT
Start: 2020-05-13

## 2020-05-13 NOTE — PROGRESS NOTES
"    Manuel Gaspar is a 59 y.o. male.     59-year-old white male who lives in a group home with history of hypertension, kidney stones, chronic anemia, neurogenic bladder, hearing loss with hearing aids, chronic hyponatremia, chronic UTIs and seizure disorder who comes in today for annual visit and fasting blood work  Caregiver states patient is doing well and not having any issues with the exception of chronic diarrhea.  Patient has been getting Metamucil and MiraLAX daily and I have stopped these and made them as needed they are to let me know if patient has any issues with constipation or diarrhea after that  Hydralazine was decreased last visit due to hypotension his pressure is stable today,blood pressure 102/68 heart rate 98 he denies any chest pain, dyspnea, tachycardia or dizziness  Weight is down about 4 pounds at 148 but staff states he is eating well  Patient's last hemoglobin was 12.8 and seems to stay there irregardless of oral iron supplementation.  His creatinine last visit was 1.17  Patient being tested today for hepatitis B and C per facility request.  Colonoscopy was canceled due to corona but is due and they have been a contact gastroenterologist    Fasting blood work today  Monitor weight  Find out from facility when pneumonia is due  Make MiraLAX and Metamucil as needed report any further diarrhea       The following portions of the patient's history were reviewed and updated as appropriate: allergies, current medications, past family history, past medical history, past social history, past surgical history and problem list.    Vitals:    05/13/20 0821   BP: 103/69   BP Location: Right arm   Patient Position: Sitting   Cuff Size: Adult   Pulse: 76   Temp: 97.1 °F (36.2 °C)   TempSrc: Oral   SpO2: 98%   Weight: 67.1 kg (148 lb)   Height: 172.7 cm (68\")     Body mass index is 22.5 kg/m².    Past Medical History:   Diagnosis Date   • Hearing loss    • Hypertension    • Kidney stone    • Mental " retardation    • Neurogenic bladder    • Seizure disorder (CMS/HCC)     onset 16-17 years old, last seizure about 2015 when weaning phenobarb     Past Surgical History:   Procedure Laterality Date   • BLADDER REPAIR     • CATARACT EXTRACTION     • ORIF PATELLA FRACTURE Left    • THORACOTOMY     • TYMPANOMASTOIDECTOMY       Family History   Family history unknown: Yes     Immunization History   Administered Date(s) Administered   • Flu Vaccine Intradermal Quad 18-64YR 10/05/2018   • Flu Vaccine Quad PF >18YRS 10/06/2015   • Flu Vaccine Quad PF >36MO 11/23/2016, 11/12/2019   • Td 06/13/2014       Clinical Support on 02/11/2020   Component Date Value Ref Range Status   • Fecal Occult Blood 02/11/2020 Negative  Negative Final   • Lot Number 02/11/2020 [id number]   Final   • Expiration Date 02/11/2020 09/30/2020   Final   • DEVELOPER LOT NUMBER 02/11/2020 [id number]   Final   • DEVELOPER EXPIRATION DATE 02/11/2020 09/30/2020   Final   • Positive Control 02/11/2020 Positive  Positive Final   • Negative Control 02/11/2020 Negative  Negative Final         Review of Systems   Constitutional: Negative.    HENT: Negative.    Respiratory: Negative.    Cardiovascular: Negative.    Gastrointestinal: Positive for diarrhea.   Genitourinary: Negative.    Musculoskeletal: Negative.    Skin: Negative.    Neurological: Negative.    Psychiatric/Behavioral: Negative.        Objective   Physical Exam   Constitutional: He is oriented to person, place, and time. He appears well-developed and well-nourished.   Cardiovascular: Normal rate and regular rhythm.   Pulmonary/Chest: Breath sounds normal.   Abdominal: Soft. Bowel sounds are normal.   Musculoskeletal: Normal range of motion.   Neurological: He is alert and oriented to person, place, and time.   Skin: Skin is warm.   Psychiatric: He has a normal mood and affect.       Procedures    Assessment/Plan   Ray was seen today for 90 day eval.    Diagnoses and all orders for this visit:    High  cholesterol  -     Lipid Panel With LDL / HDL Ratio    Hyponatremia  -     Comprehensive Metabolic Panel    Anemia due to chronic kidney disease, unspecified CKD stage  -     CBC & Differential    Preventative health care  -     Hepatitis C antibody; Future  -     Cancel: Hepatitis B E Antibody; Future  -     Hepatitis B Core Antibody, IgM; Future    Hypertension, benign    Serum creatinine raised    Weight loss, non-intentional    Other orders  -     Psyllium (Metamucil [facility] Fiber) 55.46 % powder; Take 1 teaspoon(s) by mouth 2 (Two) Times a Day. 1 tsp 1-2 x day as needed  -     polyethylene glycol (MiraLax) 17 GM/SCOOP powder; Take 17 g by mouth Daily. Mix with water 17g daily with 8oz water prn          Current Outpatient Medications:   •  acetaminophen (TYLENOL) 325 MG tablet, Take 2 tablets by mouth Every 4 (Four) Hours As Needed., Disp: , Rfl:   •  alendronate (FOSAMAX) 70 MG tablet, TAKE 1 TABLET BY MOUTH WEEKLY .TAKE WITH 8OZ WATER BEFORE ANY FOOD OR DRINK. DO NOT L IE DOWN FOR 30 MINUTES, Disp: 4 tablet, Rfl: 4  •  brimonidine-timolol (COMBIGAN) 0.2-0.5 % ophthalmic solution, Administer 1 drop to both eyes 2 (Two) Times a Day., Disp: , Rfl:   •  calcium carbonate-vitamin d 600-400 MG-UNIT per tablet, TAKE ONE (1) TABLET BY MOUTH TWICE A DAY, Disp: 60 tablet, Rfl: 2  •  DOCUSIL 100 MG capsule, TAKE ONE (1) CAPSULE BY MOUTH TWICE A DAY, Disp: 60 capsule, Rfl: 3  •  famotidine (PEPCID) 20 MG tablet, Take 1 tablet by mouth 2 (Two) Times a Day., Disp: 60 tablet, Rfl: 11  •  ferrous sulfate 325 (65 FE) MG tablet, TAKE ONE TABLET BY MOUTH TWICE DAILY, Disp: 60 tablet, Rfl: 0  •  hydrALAZINE (APRESOLINE) 10 MG tablet, One tab in am, monitor b/p, Disp: 30 tablet, Rfl: 5  •  loratadine (CLARITIN) 10 MG tablet, TAKE ONE TABLET BY MOUTH EVERY DAY, Disp: 30 tablet, Rfl: 0  •  MAPAP 325 MG tablet, TAKE 2 TABLETS BY MOUTH EVERY 4 HOURS AS NEEDED FOR FEVER AND PAIN ( MAX 12 DOSES IN 24 HOURS), Disp: 60 tablet, Rfl:  12  •  Neomycin-Bacitracin-Polymyxin (GNP TRIPLE ANTIBIOTIC) 3.5-400-5000 ointment, FOLLOW DIRECTIONS ON LABEL AS NEEDED TO PREVENT INFECTIONS IN MINOR CUTS AND SCRAPES. IF NO IMPROVEMENT IN 48 HOURS NOTIFY NURSE, Disp: 28.4 g, Rfl: 12  •  omeprazole (priLOSEC) 20 MG capsule, TAKE 1 CAPSULE BY MOUTH EVERY DAY AS NEEDED ( BUBBLE ), Disp: 30 capsule, Rfl: 0  •  OXcarbazepine (TRILEPTAL) 600 MG tablet, One tab in am and two at hs, Disp: 90 tablet, Rfl: 11  •  PHENobarbital (LUMINAL) 97.2 MG tablet, 1T PO QHS * NARC SHEET*, Disp: 30 tablet, Rfl: 4  •  Psyllium (Metamucil [facility] Fiber) 55.46 % powder, Take 1 teaspoon(s) by mouth 2 (Two) Times a Day. 1 tsp 1-2 x day as needed, Disp: 1 g, Rfl: 1  •  risperiDONE (RISPERDAL) 2 MG tablet, Take 1 tablet by mouth 2 (Two) Times a Day., Disp: 60 tablet, Rfl: 5  •  polyethylene glycol (MiraLax) 17 GM/SCOOP powder, Take 17 g by mouth Daily. Mix with water 17g daily with 8oz water prn, Disp: 1 each, Rfl: 2

## 2020-05-13 NOTE — PATIENT INSTRUCTIONS
Fasting blood work today  Make Metamucil and MiraLAX as needed report any further diarrhea  Contact gastroenterologist about scheduling colonoscopy  Find out when facility wants patient to have pneumonia vaccination

## 2020-05-14 LAB
BASOPHILS # BLD AUTO: 0.1 X10E3/UL (ref 0–0.2)
BASOPHILS NFR BLD AUTO: 1 %
EOSINOPHIL # BLD AUTO: 0.2 X10E3/UL (ref 0–0.4)
EOSINOPHIL NFR BLD AUTO: 3 %
ERYTHROCYTE [DISTWIDTH] IN BLOOD BY AUTOMATED COUNT: 12.1 % (ref 11.6–15.4)
HBV CORE IGM SERPL QL IA: NEGATIVE
HCT VFR BLD AUTO: 41.3 % (ref 37.5–51)
HCV AB S/CO SERPL IA: >11 S/CO RATIO (ref 0–0.9)
HGB BLD-MCNC: 14 G/DL (ref 13–17.7)
IMM GRANULOCYTES # BLD AUTO: 0 X10E3/UL (ref 0–0.1)
IMM GRANULOCYTES NFR BLD AUTO: 0 %
LYMPHOCYTES # BLD AUTO: 1.1 X10E3/UL (ref 0.7–3.1)
LYMPHOCYTES NFR BLD AUTO: 15 %
MCH RBC QN AUTO: 33.2 PG (ref 26.6–33)
MCHC RBC AUTO-ENTMCNC: 33.9 G/DL (ref 31.5–35.7)
MCV RBC AUTO: 98 FL (ref 79–97)
MONOCYTES # BLD AUTO: 0.7 X10E3/UL (ref 0.1–0.9)
MONOCYTES NFR BLD AUTO: 10 %
NEUTROPHILS # BLD AUTO: 5.2 X10E3/UL (ref 1.4–7)
NEUTROPHILS NFR BLD AUTO: 71 %
PLATELET # BLD AUTO: 179 X10E3/UL (ref 150–450)
RBC # BLD AUTO: 4.22 X10E6/UL (ref 4.14–5.8)
REQUEST PROBLEM: NORMAL
WBC # BLD AUTO: 7.2 X10E3/UL (ref 3.4–10.8)

## 2020-05-15 LAB
ALBUMIN SERPL-MCNC: 4.6 G/DL (ref 3.8–4.8)
ALBUMIN/GLOB SERPL: 1.6 {RATIO} (ref 1.2–2.2)
ALP SERPL-CCNC: 108 IU/L (ref 39–117)
ALT SERPL-CCNC: 42 IU/L (ref 0–44)
AST SERPL-CCNC: 32 IU/L (ref 0–40)
BILIRUB SERPL-MCNC: <0.2 MG/DL (ref 0–1.2)
BUN SERPL-MCNC: 31 MG/DL (ref 8–27)
BUN/CREAT SERPL: 24 (ref 10–24)
CALCIUM SERPL-MCNC: 10 MG/DL (ref 8.6–10.2)
CHLORIDE SERPL-SCNC: 101 MMOL/L (ref 96–106)
CHOLEST SERPL-MCNC: 211 MG/DL (ref 100–199)
CO2 SERPL-SCNC: 23 MMOL/L (ref 20–29)
CREAT SERPL-MCNC: 1.28 MG/DL (ref 0.76–1.27)
GLOBULIN SER CALC-MCNC: 2.9 G/DL (ref 1.5–4.5)
GLUCOSE SERPL-MCNC: 85 MG/DL (ref 65–99)
HDLC SERPL-MCNC: 51 MG/DL
LDLC SERPL CALC-MCNC: 116 MG/DL (ref 0–99)
LDLC/HDLC SERPL: 2.3 RATIO (ref 0–3.6)
POTASSIUM SERPL-SCNC: 4.5 MMOL/L (ref 3.5–5.2)
PROT SERPL-MCNC: 7.5 G/DL (ref 6–8.5)
SODIUM SERPL-SCNC: 142 MMOL/L (ref 134–144)
TRIGL SERPL-MCNC: 222 MG/DL (ref 0–149)
VLDLC SERPL CALC-MCNC: 44 MG/DL (ref 5–40)
WRITTEN AUTHORIZATION: NORMAL

## 2020-05-18 ENCOUNTER — RESULTS ENCOUNTER (OUTPATIENT)
Dept: FAMILY MEDICINE CLINIC | Facility: CLINIC | Age: 60
End: 2020-05-18

## 2020-05-18 DIAGNOSIS — Z00.00 PREVENTATIVE HEALTH CARE: ICD-10-CM

## 2020-05-18 RX ORDER — FENOFIBRATE 48 MG/1
48 TABLET, COATED ORAL DAILY
Qty: 30 TABLET | Refills: 2 | Status: SHIPPED | OUTPATIENT
Start: 2020-05-18 | End: 2020-07-18

## 2020-05-22 DIAGNOSIS — F20.0 PARANOID SCHIZOPHRENIA (HCC): ICD-10-CM

## 2020-05-24 RX ORDER — RISPERIDONE 2 MG/1
TABLET ORAL
Qty: 60 TABLET | Refills: 4 | Status: SHIPPED | OUTPATIENT
Start: 2020-05-24 | End: 2020-10-12

## 2020-05-25 RX ORDER — LORATADINE 10 MG/1
TABLET ORAL
Qty: 30 TABLET | Refills: 0 | Status: SHIPPED | OUTPATIENT
Start: 2020-05-25 | End: 2020-06-21

## 2020-05-25 RX ORDER — FERROUS SULFATE 325(65) MG
TABLET ORAL
Qty: 60 TABLET | Refills: 0 | Status: SHIPPED | OUTPATIENT
Start: 2020-05-25 | End: 2020-06-21

## 2020-05-27 PROBLEM — R76.8 POSITIVE HEPATITIS C ANTIBODY TEST: Status: ACTIVE | Noted: 2020-05-27

## 2020-05-27 LAB
HCV RNA SERPL QL NAA+PROBE: POSITIVE
WRITTEN AUTHORIZATION: NORMAL

## 2020-05-28 DIAGNOSIS — R76.8 POSITIVE HEPATITIS C ANTIBODY TEST: Primary | ICD-10-CM

## 2020-06-21 RX ORDER — LORATADINE 10 MG/1
TABLET ORAL
Qty: 30 TABLET | Refills: 0 | Status: SHIPPED | OUTPATIENT
Start: 2020-06-21 | End: 2020-07-18

## 2020-06-21 RX ORDER — FERROUS SULFATE 325(65) MG
TABLET ORAL
Qty: 60 TABLET | Refills: 0 | Status: SHIPPED | OUTPATIENT
Start: 2020-06-21 | End: 2020-07-18

## 2020-07-17 DIAGNOSIS — G40.109 SEIZURE, TEMPORAL LOBE (HCC): ICD-10-CM

## 2020-07-18 RX ORDER — FERROUS SULFATE 325(65) MG
TABLET ORAL
Qty: 60 TABLET | Refills: 0 | Status: SHIPPED | OUTPATIENT
Start: 2020-07-18 | End: 2020-09-14

## 2020-07-18 RX ORDER — LORATADINE 10 MG/1
TABLET ORAL
Qty: 30 TABLET | Refills: 0 | Status: SHIPPED | OUTPATIENT
Start: 2020-07-18 | End: 2020-09-14

## 2020-07-18 RX ORDER — FENOFIBRATE 48 MG/1
TABLET, COATED ORAL
Qty: 30 TABLET | Refills: 1 | Status: SHIPPED | OUTPATIENT
Start: 2020-07-18 | End: 2020-10-12

## 2020-07-18 RX ORDER — ALENDRONATE SODIUM 70 MG/1
TABLET ORAL
Qty: 4 TABLET | Refills: 3 | Status: SHIPPED | OUTPATIENT
Start: 2020-07-18 | End: 2020-12-05

## 2020-07-20 RX ORDER — PHENOBARBITAL 97.2 MG/1
TABLET ORAL
Qty: 30 TABLET | Refills: 4 | Status: SHIPPED | OUTPATIENT
Start: 2020-07-20 | End: 2020-12-31

## 2020-08-16 RX ORDER — HYDRALAZINE HYDROCHLORIDE 10 MG/1
TABLET, FILM COATED ORAL
Qty: 30 TABLET | Refills: 4 | Status: SHIPPED | OUTPATIENT
Start: 2020-08-16 | End: 2020-12-01

## 2020-08-18 ENCOUNTER — OFFICE VISIT (OUTPATIENT)
Dept: FAMILY MEDICINE CLINIC | Facility: CLINIC | Age: 60
End: 2020-08-18

## 2020-08-18 VITALS
OXYGEN SATURATION: 96 % | DIASTOLIC BLOOD PRESSURE: 74 MMHG | BODY MASS INDEX: 22.43 KG/M2 | TEMPERATURE: 97.5 F | SYSTOLIC BLOOD PRESSURE: 111 MMHG | WEIGHT: 148 LBS | HEIGHT: 68 IN | HEART RATE: 86 BPM

## 2020-08-18 DIAGNOSIS — R76.8 POSITIVE HEPATITIS C ANTIBODY TEST: ICD-10-CM

## 2020-08-18 DIAGNOSIS — R74.8 ELEVATED CREATINE KINASE: Primary | ICD-10-CM

## 2020-08-18 DIAGNOSIS — I10 HYPERTENSION, BENIGN: ICD-10-CM

## 2020-08-18 PROCEDURE — 99213 OFFICE O/P EST LOW 20 MIN: CPT | Performed by: NURSE PRACTITIONER

## 2020-08-18 NOTE — PATIENT INSTRUCTIONS
Keep all appointments with GI for hepatitis C  Continue to monitor weight  Blood work today  Fasting follow-up visit 3 months

## 2020-08-18 NOTE — PROGRESS NOTES
"    Manuel Gaspar is a 60 y.o. male.     60-year-old white male who lives in a group home with history of hypertension, kidney stones, chronic anemia, neurogenic bladder, hearing loss with hearing aids, chronic hyponatremia, chronic UTIs and seizure disorder who comes in for 3-month follow-up visit today  Patient recently saw GI for liver biopsy due to chronic hep C fortunately no other abnormal findings were found and no cirrhosis was present.  They are getting ready to place him on a medication to treat his hep C  Blood pressure 110/74 heart rate 86 he denies any chest pain, dyspnea, tachycardia or dizziness  Patient was having diarrhea last visit and that has resolved  Weight is stable at 148 we will continue to monitor his weight  Patient received pneumonia vaccines at the facility every year.  Patient's last creatinine was 1.28 we will be rechecking today    CMP  Keep appointment with GI  Continue to monitor weight  Follow-up fasting 3 months       The following portions of the patient's history were reviewed and updated as appropriate: allergies, current medications, past family history, past medical history, past social history, past surgical history and problem list.    Vitals:    08/18/20 1104   BP: 111/74   BP Location: Right arm   Patient Position: Sitting   Cuff Size: Adult   Pulse: 86   Temp: 97.5 °F (36.4 °C)   TempSrc: Temporal   SpO2: 96%   Weight: 67.1 kg (148 lb)   Height: 172.7 cm (68\")     Body mass index is 22.5 kg/m².    Past Medical History:   Diagnosis Date   • Hearing loss    • Hypertension    • Kidney stone    • Mental retardation    • Neurogenic bladder    • Seizure disorder (CMS/HCC)     onset 16-17 years old, last seizure about 2015 when weaning phenobarb     Past Surgical History:   Procedure Laterality Date   • BLADDER REPAIR     • CATARACT EXTRACTION     • ORIF PATELLA FRACTURE Left    • THORACOTOMY     • TYMPANOMASTOIDECTOMY       Family History   Family history unknown: Yes "     Immunization History   Administered Date(s) Administered   • Flu Vaccine Intradermal Quad 18-64YR 10/05/2018   • Flu Vaccine Quad PF >18YRS 10/06/2015   • Flu Vaccine Quad PF >36MO 11/23/2016, 11/12/2019   • Td 06/13/2014       Orders Only on 05/13/2020   Component Date Value Ref Range Status   • Hep C Virus Ab 05/13/2020 >11.0* 0.0 - 0.9 s/co ratio Final    Comment:                                   Negative:     < 0.8                               Indeterminate: 0.8 - 0.9                                    Positive:     > 0.9   The CDC recommends that a positive HCV antibody result   be followed up with a HCV Nucleic Acid Amplification   test ([id number]).     • Hep B Core IgM 05/13/2020 Negative  Negative Final         Review of Systems   Constitutional: Negative.    HENT: Negative.    Respiratory: Negative.    Cardiovascular: Negative.    Gastrointestinal: Negative.    Genitourinary: Negative.    Musculoskeletal: Negative.    Neurological: Negative.    Psychiatric/Behavioral: Negative.        Objective   Physical Exam   Constitutional: He is oriented to person, place, and time. He appears well-developed and well-nourished.   Cardiovascular: Normal rate and regular rhythm.   Pulmonary/Chest: Effort normal and breath sounds normal.   Abdominal: Soft. Bowel sounds are normal.   Musculoskeletal: Normal range of motion.   Neurological: He is alert and oriented to person, place, and time.   Skin: Skin is warm and dry.   Psychiatric: He has a normal mood and affect.       Procedures    Assessment/Plan   Ray was seen today for 3 month follow up.    Diagnoses and all orders for this visit:    Elevated creatine kinase  -     Comprehensive Metabolic Panel    Hypertension, benign    Positive hepatitis C antibody test    BMI 22.0-22.9, adult          Current Outpatient Medications:   •  acetaminophen (TYLENOL) 325 MG tablet, Take 2 tablets by mouth Every 4 (Four) Hours As Needed., Disp: , Rfl:   •  alendronate (FOSAMAX) 70  MG tablet, TAKE 1 TABLET BY MOUTH WEEKLY .TAKE WITH 8OZ WATER BEFORE ANY FOOD OR DRINK. DO NOT L IE DOWN FOR 30 MINUTES, Disp: 4 tablet, Rfl: 3  •  brimonidine-timolol (COMBIGAN) 0.2-0.5 % ophthalmic solution, Administer 1 drop to both eyes 2 (Two) Times a Day., Disp: , Rfl:   •  calcium carbonate-vitamin d 600-400 MG-UNIT per tablet, TAKE ONE TABLET BY MOUTH TWICE A DAY, Disp: 60 tablet, Rfl: 0  •  DOCUSIL 100 MG capsule, TAKE ONE (1) CAPSULE BY MOUTH TWICE A DAY, Disp: 60 capsule, Rfl: 3  •  famotidine (PEPCID) 20 MG tablet, Take 1 tablet by mouth 2 (Two) Times a Day., Disp: 60 tablet, Rfl: 11  •  fenofibrate (TRICOR) 48 MG tablet, TAKE 1 TABLET BY MOUTH EVERY NIGHT AT BEDTIME, Disp: 30 tablet, Rfl: 1  •  ferrous sulfate 325 (65 FE) MG tablet, TAKE ONE TABLET BY MOUTH TWICE DAILY, Disp: 60 tablet, Rfl: 0  •  hydrALAZINE (APRESOLINE) 10 MG tablet, TAKE ONE TABLET BY MOUTH DAILY ( DECREASE ) MONITOR B/P, Disp: 30 tablet, Rfl: 4  •  loratadine (CLARITIN) 10 MG tablet, TAKE ONE TABLET BY MOUTH EVERY DAY, Disp: 30 tablet, Rfl: 0  •  MAPAP 325 MG tablet, TAKE 2 TABLETS BY MOUTH EVERY 4 HOURS AS NEEDED FOR FEVER AND PAIN ( MAX 12 DOSES IN 24 HOURS), Disp: 60 tablet, Rfl: 12  •  Neomycin-Bacitracin-Polymyxin (GNP TRIPLE ANTIBIOTIC) 3.5-400-5000 ointment, FOLLOW DIRECTIONS ON LABEL AS NEEDED TO PREVENT INFECTIONS IN MINOR CUTS AND SCRAPES. IF NO IMPROVEMENT IN 48 HOURS NOTIFY NURSE, Disp: 28.4 g, Rfl: 12  •  omeprazole (priLOSEC) 20 MG capsule, TAKE 1 CAPSULE BY MOUTH EVERY DAY AS NEEDED ( BUBBLE ), Disp: 30 capsule, Rfl: 0  •  OXcarbazepine (TRILEPTAL) 600 MG tablet, One tab in am and two at hs, Disp: 90 tablet, Rfl: 11  •  PHENobarbital (LUMINAL) 97.2 MG tablet, TAKE 1 TABLET BY MOUTH EVERY NIGHT AT BEDTIME * NARC SHEET*, Disp: 30 tablet, Rfl: 4  •  polyethylene glycol (MiraLax) 17 GM/SCOOP powder, Take 17 g by mouth Daily. Mix with water 17g daily with 8oz water prn, Disp: 1 each, Rfl: 2  •  Psyllium (Metamucil  [facility] Fiber) 55.46 % powder, Take 1 teaspoon(s) by mouth 2 (Two) Times a Day. 1 tsp 1-2 x day as needed, Disp: 1 g, Rfl: 1  •  risperiDONE (risperDAL) 2 MG tablet, TAKE 1 TABLET BY MOUTH TWICE DAILY, Disp: 60 tablet, Rfl: 4

## 2020-08-19 LAB
ALBUMIN SERPL-MCNC: 4.5 G/DL (ref 3.8–4.8)
ALBUMIN/GLOB SERPL: 1.6 {RATIO} (ref 1.2–2.2)
ALP SERPL-CCNC: 63 IU/L (ref 39–117)
ALT SERPL-CCNC: 39 IU/L (ref 0–44)
AST SERPL-CCNC: 32 IU/L (ref 0–40)
BILIRUB SERPL-MCNC: <0.2 MG/DL (ref 0–1.2)
BUN SERPL-MCNC: 32 MG/DL (ref 8–27)
BUN/CREAT SERPL: 20 (ref 10–24)
CALCIUM SERPL-MCNC: 9.6 MG/DL (ref 8.6–10.2)
CHLORIDE SERPL-SCNC: 100 MMOL/L (ref 96–106)
CO2 SERPL-SCNC: 27 MMOL/L (ref 20–29)
CREAT SERPL-MCNC: 1.62 MG/DL (ref 0.76–1.27)
GLOBULIN SER CALC-MCNC: 2.9 G/DL (ref 1.5–4.5)
GLUCOSE SERPL-MCNC: 75 MG/DL (ref 65–99)
POTASSIUM SERPL-SCNC: 4.7 MMOL/L (ref 3.5–5.2)
PROT SERPL-MCNC: 7.4 G/DL (ref 6–8.5)
SODIUM SERPL-SCNC: 142 MMOL/L (ref 134–144)

## 2020-08-31 ENCOUNTER — TELEPHONE (OUTPATIENT)
Dept: FAMILY MEDICINE CLINIC | Facility: CLINIC | Age: 60
End: 2020-08-31

## 2020-09-14 RX ORDER — LORATADINE 10 MG/1
TABLET ORAL
Qty: 30 TABLET | Refills: 0 | Status: SHIPPED | OUTPATIENT
Start: 2020-09-14 | End: 2020-10-12

## 2020-09-14 RX ORDER — FERROUS SULFATE 325(65) MG
TABLET ORAL
Qty: 60 TABLET | Refills: 0 | Status: SHIPPED | OUTPATIENT
Start: 2020-09-14 | End: 2020-10-12

## 2020-10-10 DIAGNOSIS — F20.0 PARANOID SCHIZOPHRENIA (HCC): ICD-10-CM

## 2020-10-12 RX ORDER — RISPERIDONE 2 MG/1
TABLET ORAL
Qty: 60 TABLET | Refills: 3 | Status: SHIPPED | OUTPATIENT
Start: 2020-10-12 | End: 2021-01-30

## 2020-10-12 RX ORDER — LORATADINE 10 MG/1
TABLET ORAL
Qty: 30 TABLET | Refills: 0 | Status: SHIPPED | OUTPATIENT
Start: 2020-10-12 | End: 2020-11-10

## 2020-10-12 RX ORDER — FERROUS SULFATE 325(65) MG
TABLET ORAL
Qty: 60 TABLET | Refills: 0 | Status: SHIPPED | OUTPATIENT
Start: 2020-10-12 | End: 2020-11-10

## 2020-10-12 RX ORDER — FENOFIBRATE 48 MG/1
TABLET, COATED ORAL
Qty: 30 TABLET | Refills: 1 | Status: SHIPPED | OUTPATIENT
Start: 2020-10-12 | End: 2020-12-05

## 2020-10-12 RX ORDER — FAMOTIDINE 20 MG/1
TABLET, FILM COATED ORAL
Qty: 60 TABLET | Refills: 10 | Status: SHIPPED | OUTPATIENT
Start: 2020-10-12 | End: 2021-08-17

## 2020-11-10 RX ORDER — LORATADINE 10 MG/1
TABLET ORAL
Qty: 30 TABLET | Refills: 0 | Status: SHIPPED | OUTPATIENT
Start: 2020-11-10 | End: 2020-12-05

## 2020-11-10 RX ORDER — FERROUS SULFATE 325(65) MG
TABLET ORAL
Qty: 60 TABLET | Refills: 0 | Status: SHIPPED | OUTPATIENT
Start: 2020-11-10 | End: 2020-12-05

## 2020-11-17 ENCOUNTER — OFFICE VISIT (OUTPATIENT)
Dept: PSYCHIATRY | Facility: CLINIC | Age: 60
End: 2020-11-17

## 2020-11-17 DIAGNOSIS — F79 INTELLECTUAL DISABILITY: ICD-10-CM

## 2020-11-17 DIAGNOSIS — F20.0 PARANOID SCHIZOPHRENIA (HCC): Primary | ICD-10-CM

## 2020-11-17 PROCEDURE — 99213 OFFICE O/P EST LOW 20 MIN: CPT | Performed by: PHYSICIAN ASSISTANT

## 2020-11-17 NOTE — PROGRESS NOTES
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  His  with him today says he is doing well, no issues or concerns.    She says he has done \"surprisingly well\" with the isolation during COVID  No AVH  Denies anxiety or depression  No agitation or aggression  No SI/HI  Medications are fine, no need for changes.     The following portions of the patient's history were reviewed and updated as appropriate: allergies, current medications, past family history, past medical history, past social history, past surgical history and problem list.    PAST PSYCHIATRIC HISTORY  Axis I  Schizophrenia/cognitive disorder  Axis II  Learning Disorder    PAST OUTPATIENT TREATMENT  Diagnosis treated:  Cognitive Disorder  Treatment Type:  Medication Management  Prior Psychiatric Medications:  Risperdal  Trileptail  Support Groups:  None  Sequelae Of Mental Disorder:  emotional distress      Interval History  No Change    Side Effects  None    Past Psych History was reviewed and compared to 5/11/20 visit and no updates were needed.      Past Medical History:  Past Medical History:   Diagnosis Date   • Hearing loss    • Hypertension    • Kidney stone    • Mental retardation    • Neurogenic bladder    • Seizure disorder (CMS/HCC)     onset 16-17 years old, last seizure about 2015 when weaning phenobarb       Social History:  Social History     Socioeconomic History   • Marital status: Single     Spouse name: Not on file   • Number of children: Not on file   • Years of education: Not on file   • Highest education level: Not on file   Tobacco Use   • Smoking status: Never Smoker   • Smokeless tobacco: Never Used   Substance and Sexual Activity   • Alcohol use: No     Frequency: Never   • Drug use: No   • Sexual activity: Never       Family " History:  Family History   Family history unknown: Yes       Past Surgical History:  Past Surgical History:   Procedure Laterality Date   • BLADDER REPAIR     • CATARACT EXTRACTION     • ORIF PATELLA FRACTURE Left    • THORACOTOMY     • TYMPANOMASTOIDECTOMY         Problem List:  Patient Active Problem List   Diagnosis   • Allergy status to unspecified drugs, medicaments and biological substances status   • Anemia   • Constipation   • Deafness   • Difficult or painful urination   • Dysthymia   • Encounter for lipid screening for cardiovascular disease   • Encounter for screening   • Encounter for screening for malignant neoplasm of prostate   • Encounter for immunization   • Therapeutic drug monitoring   • Encounter for immunization   • Encounter for general adult medical examination without abnormal findings   • Enlarged prostate without lower urinary tract symptoms (luts)   • Generalized anxiety disorder   • Headache   • High cholesterol   • Hypertension, benign   • Hyponatremia   • Insomnia   • Knee pain   • Intellectual disability   • Nephrolithiasis   • Otalgia   • Otitis media   • Partial epilepsy with impairment of consciousness, intractable (CMS/HCC)   • Peptic ulcer disease   • Schizophrenia (CMS/HCC)   • Seizure disorder (CMS/HCC)   • Serum creatinine raised   • Weight loss, non-intentional   • Positive hepatitis C antibody test       Allergy:   Allergies   Allergen Reactions   • Levofloxacin Other (See Comments)   • Sulfamethoxazole-Trimethoprim Other (See Comments)        Discontinued Medications:  There are no discontinued medications.    Current Medications:   Current Outpatient Medications   Medication Sig Dispense Refill   • acetaminophen (TYLENOL) 325 MG tablet Take 2 tablets by mouth Every 4 (Four) Hours As Needed.     • alendronate (FOSAMAX) 70 MG tablet TAKE 1 TABLET BY MOUTH WEEKLY .TAKE WITH 8OZ WATER BEFORE ANY FOOD OR DRINK. DO NOT L IE DOWN FOR 30 MINUTES 4 tablet 3   • brimonidine-timolol  (COMBIGAN) 0.2-0.5 % ophthalmic solution Administer 1 drop to both eyes 2 (Two) Times a Day.     • calcium carbonate-vitamin d 600-400 MG-UNIT per tablet TAKE ONE TABLET BY MOUTH TWICE DAILY 60 tablet 0   • DOCUSIL 100 MG capsule TAKE ONE (1) CAPSULE BY MOUTH TWICE A DAY 60 capsule 3   • famotidine (PEPCID) 20 MG tablet TAKE 1 TABLET BY MOUTH TWICE DAILY 60 tablet 10   • fenofibrate (TRICOR) 48 MG tablet TAKE 1 TABLET BY MOUTH AT BEDTIME 30 tablet 1   • ferrous sulfate 325 (65 FE) MG tablet TAKE ONE TABLET BY MOUTH TWICE DAILY 60 tablet 0   • hydrALAZINE (APRESOLINE) 10 MG tablet TAKE ONE TABLET BY MOUTH DAILY ( DECREASE ) MONITOR B/P 30 tablet 4   • loratadine (CLARITIN) 10 MG tablet TAKE ONE TABLET BY MOUTH EVERY DAY 30 tablet 0   • MAPAP 325 MG tablet TAKE 2 TABLETS BY MOUTH EVERY 4 HOURS AS NEEDED FOR FEVER AND PAIN ( MAX 12 DOSES IN 24 HOURS) 60 tablet 12   • Neomycin-Bacitracin-Polymyxin (GNP TRIPLE ANTIBIOTIC) 3.5-400-5000 ointment FOLLOW DIRECTIONS ON LABEL AS NEEDED TO PREVENT INFECTIONS IN MINOR CUTS AND SCRAPES. IF NO IMPROVEMENT IN 48 HOURS NOTIFY NURSE 28.4 g 12   • omeprazole (priLOSEC) 20 MG capsule TAKE 1 CAPSULE BY MOUTH EVERY DAY AS NEEDED ( BUBBLE ) 30 capsule 0   • OXcarbazepine (TRILEPTAL) 600 MG tablet One tab in am and two at hs 90 tablet 11   • PHENobarbital (LUMINAL) 97.2 MG tablet TAKE 1 TABLET BY MOUTH EVERY NIGHT AT BEDTIME * NARC SHEET* 30 tablet 4   • polyethylene glycol (MiraLax) 17 GM/SCOOP powder Take 17 g by mouth Daily. Mix with water 17g daily with 8oz water prn 1 each 2   • Psyllium (Metamucil [facility] Fiber) 55.46 % powder Take 1 teaspoon(s) by mouth 2 (Two) Times a Day. 1 tsp 1-2 x day as needed 1 g 1   • risperiDONE (risperDAL) 2 MG tablet TAKE 1 TABLET BY MOUTH TWICE DAILY 60 tablet 3     No current facility-administered medications for this visit.          Review of Symptoms:    Psychiatric/Behavioral: Negative for agitation, behavioral problems, confusion, decreased  concentration, dysphoric mood, hallucinations, self-injury, sleep disturbance and suicidal ideas. The patient is not nervous/anxious and is not hyperactive.        Physical Exam:   There were no vitals taken for this visit.    Mental Status Exam:   Hygiene:   Good  Cooperation:  Cooperative  Eye Contact:  Good  Psychomotor Behavior:  Slow  Affect:  Appropriate  Mood: normal  Hopelessness: Denies  Speech:  Normal  Thought Process:  Goal directed  Thought Content:  Normal  Suicidal:  None  Homicidal:  None  Hallucinations:  None  Delusion:  None  Memory:  Deficits  Orientation:  Person, Place, Time and Situation  Reliability:  fair  Insight:  Fair  Judgement:  Fair  Impulse Control:  Good  Physical/Medical Issues:  No      Mental Status Exam was reviewed and compared to 5/11/20 visit and appropriate updates were made.     PHQ-9 Depression Screening  Little interest or pleasure in doing things? 0   Feeling down, depressed, or hopeless? 0   Trouble falling or staying asleep, or sleeping too much?     Feeling tired or having little energy?     Poor appetite or overeating?     Feeling bad about yourself - or that you are a failure or have let yourself or your family down?     Trouble concentrating on things, such as reading the newspaper or watching television?     Moving or speaking so slowly that other people could have noticed? Or the opposite - being so fidgety or restless that you have been moving around a lot more than usual?     Thoughts that you would be better off dead, or of hurting yourself in some way?     PHQ-9 Total Score 0   If you checked off any problems, how difficult have these problems made it for you to do your work, take care of things at home, or get along with other people?             Never smoker    I advised Ray of the risks of tobacco use.     Lab Results:   Office Visit on 08/18/2020   Component Date Value Ref Range Status   • Glucose 08/18/2020 75  65 - 99 mg/dL Final   • BUN 08/18/2020 32* 8  - 27 mg/dL Final   • Creatinine 08/18/2020 1.62* 0.76 - 1.27 mg/dL Final   • eGFR Non African Am 08/18/2020 45* >59 mL/min/1.73 Final   • eGFR African Am 08/18/2020 52* >59 mL/min/1.73 Final   • BUN/Creatinine Ratio 08/18/2020 20  10 - 24 Final   • Sodium 08/18/2020 142  134 - 144 mmol/L Final   • Potassium 08/18/2020 4.7  3.5 - 5.2 mmol/L Final   • Chloride 08/18/2020 100  96 - 106 mmol/L Final   • Total CO2 08/18/2020 27  20 - 29 mmol/L Final   • Calcium 08/18/2020 9.6  8.6 - 10.2 mg/dL Final   • Total Protein 08/18/2020 7.4  6.0 - 8.5 g/dL Final   • Albumin 08/18/2020 4.5  3.8 - 4.8 g/dL Final   • Globulin 08/18/2020 2.9  1.5 - 4.5 g/dL Final   • A/G Ratio 08/18/2020 1.6  1.2 - 2.2 Final   • Total Bilirubin 08/18/2020 <0.2  0.0 - 1.2 mg/dL Final   • Alkaline Phosphatase 08/18/2020 63  39 - 117 IU/L Final   • AST (SGOT) 08/18/2020 32  0 - 40 IU/L Final   • ALT (SGPT) 08/18/2020 39  0 - 44 IU/L Final       Assessment/Plan   Problems Addressed this Visit        Other    Intellectual disability    Schizophrenia (CMS/[facility]) - Primary      Diagnoses       Codes Comments    Paranoid schizophrenia (CMS/[facility])    -  Primary ICD-10-CM: F20.0  ICD-9-CM: 295.30     Intellectual disability     ICD-10-CM: F79  ICD-9-CM: 319           Visit Diagnoses:    ICD-10-CM ICD-9-CM   1. Paranoid schizophrenia (CMS/[facility])  F20.0 295.30   2. Intellectual disability  F79 319       TREATMENT PLAN/GOALS: Continue supportive psychotherapy efforts and medications as indicated. Treatment and medication options discussed during today's visit. Patient ackowledged and verbally consented to continue with current treatment plan and was educated on the importance of compliance with treatment and follow-up appointments.    MEDICATION ISSUES:  INSPECT reviewed as expected  Discussed medication options and treatment plan of prescribed medication as well as the risks, benefits, and side effects including potential falls, possible impaired driving and metabolic  adversities among others. Patient is agreeable to call the office with any worsening of symptoms or onset of side effects. Patient is agreeable to call 911 or go to the nearest ER should he/she begin having SI/HI. No medication side effects or related complaints today.     Patient continues to do well on Risperdal, no changes needed, no concerns with his behavior or symptoms.  [facility] pharmacy will contact when refill needed.    MEDS ORDERED DURING VISIT:  No orders of the defined types were placed in this encounter.      Return in about 6 months (around 5/17/2021).      This document has been electronically signed by Krista Epstein PA-C  November 17, 2020 21:46 EST

## 2020-12-01 ENCOUNTER — OFFICE VISIT (OUTPATIENT)
Dept: FAMILY MEDICINE CLINIC | Facility: CLINIC | Age: 60
End: 2020-12-01

## 2020-12-01 ENCOUNTER — TELEPHONE (OUTPATIENT)
Dept: FAMILY MEDICINE CLINIC | Facility: CLINIC | Age: 60
End: 2020-12-01

## 2020-12-01 VITALS
HEIGHT: 68 IN | DIASTOLIC BLOOD PRESSURE: 66 MMHG | WEIGHT: 153 LBS | TEMPERATURE: 97.1 F | BODY MASS INDEX: 23.19 KG/M2 | OXYGEN SATURATION: 96 % | SYSTOLIC BLOOD PRESSURE: 98 MMHG | HEART RATE: 68 BPM

## 2020-12-01 DIAGNOSIS — Z13.6 ENCOUNTER FOR LIPID SCREENING FOR CARDIOVASCULAR DISEASE: ICD-10-CM

## 2020-12-01 DIAGNOSIS — R76.8 POSITIVE HEPATITIS C ANTIBODY TEST: ICD-10-CM

## 2020-12-01 DIAGNOSIS — Z13.220 ENCOUNTER FOR LIPID SCREENING FOR CARDIOVASCULAR DISEASE: ICD-10-CM

## 2020-12-01 DIAGNOSIS — R79.89 SERUM CREATININE RAISED: ICD-10-CM

## 2020-12-01 DIAGNOSIS — E78.00 HIGH CHOLESTEROL: Primary | ICD-10-CM

## 2020-12-01 DIAGNOSIS — I10 HYPERTENSION, BENIGN: ICD-10-CM

## 2020-12-01 PROCEDURE — 99213 OFFICE O/P EST LOW 20 MIN: CPT | Performed by: NURSE PRACTITIONER

## 2020-12-01 RX ORDER — PRAVASTATIN SODIUM 10 MG
10 TABLET ORAL DAILY
Qty: 30 TABLET | Refills: 2 | Status: SHIPPED | OUTPATIENT
Start: 2020-12-01 | End: 2020-12-02 | Stop reason: ALTCHOICE

## 2020-12-01 NOTE — TELEPHONE ENCOUNTER
Need order faxed to [facility] to stop the Hydralazine and also need to know if pt needs to take the Pravastatin.  It was sent in to the pharmacy but marked out on the paper they took back to the group home.  SG

## 2020-12-01 NOTE — PATIENT INSTRUCTIONS
Blood work today  Stop hydralazine monitor blood pressures according to parameters given  Make follow-up appointment with Dr. Valentine  Follow-up 3 months

## 2020-12-01 NOTE — PROGRESS NOTES
"    Manuel Gaspar is a 60 y.o. male.     60-year-old white male who lives in a group home with history of hypertension, kidney stones, chronic anemia, neurogenic bladder, hearing loss with hearing aids, chronic hyponatremia, chronic UTIs and seizure disorder who comes in today for 3-month follow-up visit  Blood pressure 98/66 heart rate 68 he denies any chest pain, dyspnea, tachycardia or dizziness.  Patient blood pressure is always low I am going to stop his hydralazine since it is such a small dose and see what his blood pressures do  Patient's last creatinine was 1.6 which is elevated for him I will be rechecking that today.     Patient currently being treated for hepatitis C and needs to call make a follow-up appointment with Dr. Valentine  Weight is up 5 pounds at 153 with a BMI of 23.3      Stop hydralazine   Lipid panel/CMP today   Monitor blood pressures closely  Make follow-up appointment with Dr. Valentine         The following portions of the patient's history were reviewed and updated as appropriate: allergies, current medications, past family history, past medical history, past social history, past surgical history and problem list.    Vitals:    12/01/20 0830   BP: 98/66   BP Location: Right arm   Patient Position: Sitting   Cuff Size: Adult   Pulse: 68   Temp: 97.1 °F (36.2 °C)   TempSrc: Temporal   SpO2: 96%   Weight: 69.4 kg (153 lb)   Height: 172.7 cm (68\")     Body mass index is 23.26 kg/m².    Past Medical History:   Diagnosis Date   • Hearing loss    • Hypertension    • Kidney stone    • Mental retardation    • Neurogenic bladder    • Seizure disorder (CMS/HCC)     onset 16-17 years old, last seizure about 2015 when weaning phenobarb     Past Surgical History:   Procedure Laterality Date   • BLADDER REPAIR     • CATARACT EXTRACTION     • ORIF PATELLA FRACTURE Left    • THORACOTOMY     • TYMPANOMASTOIDECTOMY       Family History   Family history unknown: Yes     Immunization History   Administered Date(s) " Administered   • Flu Vaccine Intradermal Quad 18-64YR 10/05/2018   • Flu Vaccine Quad PF >18YRS 10/06/2015   • Flu Vaccine Quad PF >36MO 11/23/2016, 11/12/2019   • Td 06/13/2014       Office Visit on 08/18/2020   Component Date Value Ref Range Status   • Glucose 08/18/2020 75  65 - 99 mg/dL Final   • BUN 08/18/2020 32* 8 - 27 mg/dL Final   • Creatinine 08/18/2020 1.62* 0.76 - 1.27 mg/dL Final   • eGFR Non African Am 08/18/2020 45* >59 mL/min/1.73 Final   • eGFR African Am 08/18/2020 52* >59 mL/min/1.73 Final   • BUN/Creatinine Ratio 08/18/2020 20  10 - 24 Final   • Sodium 08/18/2020 142  134 - 144 mmol/L Final   • Potassium 08/18/2020 4.7  3.5 - 5.2 mmol/L Final   • Chloride 08/18/2020 100  96 - 106 mmol/L Final   • Total CO2 08/18/2020 27  20 - 29 mmol/L Final   • Calcium 08/18/2020 9.6  8.6 - 10.2 mg/dL Final   • Total Protein 08/18/2020 7.4  6.0 - 8.5 g/dL Final   • Albumin 08/18/2020 4.5  3.8 - 4.8 g/dL Final   • Globulin 08/18/2020 2.9  1.5 - 4.5 g/dL Final   • A/G Ratio 08/18/2020 1.6  1.2 - 2.2 Final   • Total Bilirubin 08/18/2020 <0.2  0.0 - 1.2 mg/dL Final   • Alkaline Phosphatase 08/18/2020 63  39 - 117 IU/L Final   • AST (SGOT) 08/18/2020 32  0 - 40 IU/L Final   • ALT (SGPT) 08/18/2020 39  0 - 44 IU/L Final         Review of Systems   Constitutional: Negative.    HENT: Negative.    Respiratory: Negative.    Cardiovascular: Negative.    Genitourinary: Negative.    Musculoskeletal: Negative.    Skin: Negative.    Neurological: Negative.    Psychiatric/Behavioral: Negative.        Objective   Physical Exam  Constitutional:       Appearance: Normal appearance.   HENT:      Head: Normocephalic.   Neck:      Musculoskeletal: Normal range of motion.   Cardiovascular:      Rate and Rhythm: Normal rate and regular rhythm.      Pulses: Normal pulses.      Heart sounds: Normal heart sounds.   Pulmonary:      Effort: Pulmonary effort is normal.      Breath sounds: Normal breath sounds.   Musculoskeletal: Normal range  of motion.   Skin:     General: Skin is warm and dry.   Neurological:      General: No focal deficit present.      Mental Status: He is alert and oriented to person, place, and time.   Psychiatric:         Mood and Affect: Mood normal.         Behavior: Behavior normal.         Procedures    Assessment/Plan   Diagnoses and all orders for this visit:    1. High cholesterol (Primary)  -     Lipid Panel With LDL / HDL Ratio    2. Serum creatinine raised  -     Comprehensive Metabolic Panel    3. Hypertension, benign    4. Encounter for lipid screening for cardiovascular disease    5. Positive hepatitis C antibody test    Other orders  -     pravastatin (PRAVACHOL) 10 MG tablet; Take 1 tablet by mouth Daily.  Dispense: 30 tablet; Refill: 2          Current Outpatient Medications:   •  acetaminophen (TYLENOL) 325 MG tablet, Take 2 tablets by mouth Every 4 (Four) Hours As Needed., Disp: , Rfl:   •  alendronate (FOSAMAX) 70 MG tablet, TAKE 1 TABLET BY MOUTH WEEKLY .TAKE WITH 8OZ WATER BEFORE ANY FOOD OR DRINK. DO NOT L IE DOWN FOR 30 MINUTES, Disp: 4 tablet, Rfl: 3  •  brimonidine-timolol (COMBIGAN) 0.2-0.5 % ophthalmic solution, Administer 1 drop to both eyes 2 (Two) Times a Day., Disp: , Rfl:   •  calcium carbonate-vitamin d 600-400 MG-UNIT per tablet, TAKE ONE TABLET BY MOUTH TWICE DAILY, Disp: 60 tablet, Rfl: 0  •  DOCUSIL 100 MG capsule, TAKE ONE (1) CAPSULE BY MOUTH TWICE A DAY, Disp: 60 capsule, Rfl: 3  •  famotidine (PEPCID) 20 MG tablet, TAKE 1 TABLET BY MOUTH TWICE DAILY, Disp: 60 tablet, Rfl: 10  •  fenofibrate (TRICOR) 48 MG tablet, TAKE 1 TABLET BY MOUTH AT BEDTIME, Disp: 30 tablet, Rfl: 1  •  ferrous sulfate 325 (65 FE) MG tablet, TAKE ONE TABLET BY MOUTH TWICE DAILY, Disp: 60 tablet, Rfl: 0  •  loratadine (CLARITIN) 10 MG tablet, TAKE ONE TABLET BY MOUTH EVERY DAY, Disp: 30 tablet, Rfl: 0  •  MAPAP 325 MG tablet, TAKE 2 TABLETS BY MOUTH EVERY 4 HOURS AS NEEDED FOR FEVER AND PAIN ( MAX 12 DOSES IN 24 HOURS),  Disp: 60 tablet, Rfl: 12  •  Neomycin-Bacitracin-Polymyxin (GNP TRIPLE ANTIBIOTIC) 3.5-400-5000 ointment, FOLLOW DIRECTIONS ON LABEL AS NEEDED TO PREVENT INFECTIONS IN MINOR CUTS AND SCRAPES. IF NO IMPROVEMENT IN 48 HOURS NOTIFY NURSE, Disp: 28.4 g, Rfl: 12  •  omeprazole (priLOSEC) 20 MG capsule, TAKE 1 CAPSULE BY MOUTH EVERY DAY AS NEEDED ( BUBBLE ), Disp: 30 capsule, Rfl: 0  •  OXcarbazepine (TRILEPTAL) 600 MG tablet, One tab in am and two at hs, Disp: 90 tablet, Rfl: 11  •  PHENobarbital (LUMINAL) 97.2 MG tablet, TAKE 1 TABLET BY MOUTH EVERY NIGHT AT BEDTIME * NARC SHEET*, Disp: 30 tablet, Rfl: 4  •  polyethylene glycol (MiraLax) 17 GM/SCOOP powder, Take 17 g by mouth Daily. Mix with water 17g daily with 8oz water prn, Disp: 1 each, Rfl: 2  •  pravastatin (PRAVACHOL) 10 MG tablet, Take 1 tablet by mouth Daily., Disp: 30 tablet, Rfl: 2  •  Psyllium (Metamucil [facility] Fiber) 55.46 % powder, Take 1 teaspoon(s) by mouth 2 (Two) Times a Day. 1 tsp 1-2 x day as needed, Disp: 1 g, Rfl: 1  •  risperiDONE (risperDAL) 2 MG tablet, TAKE 1 TABLET BY MOUTH TWICE DAILY, Disp: 60 tablet, Rfl: 3

## 2020-12-02 LAB
ALBUMIN SERPL-MCNC: 4.3 G/DL (ref 3.8–4.8)
ALBUMIN/GLOB SERPL: 1.5 {RATIO} (ref 1.2–2.2)
ALP SERPL-CCNC: 63 IU/L (ref 39–117)
ALT SERPL-CCNC: 17 IU/L (ref 0–44)
AST SERPL-CCNC: 17 IU/L (ref 0–40)
BILIRUB SERPL-MCNC: 0.2 MG/DL (ref 0–1.2)
BUN SERPL-MCNC: 36 MG/DL (ref 8–27)
BUN/CREAT SERPL: 23 (ref 10–24)
CALCIUM SERPL-MCNC: 9.9 MG/DL (ref 8.6–10.2)
CHLORIDE SERPL-SCNC: 102 MMOL/L (ref 96–106)
CHOLEST SERPL-MCNC: 213 MG/DL (ref 100–199)
CO2 SERPL-SCNC: 26 MMOL/L (ref 20–29)
CREAT SERPL-MCNC: 1.55 MG/DL (ref 0.76–1.27)
GLOBULIN SER CALC-MCNC: 2.8 G/DL (ref 1.5–4.5)
GLUCOSE SERPL-MCNC: 85 MG/DL (ref 65–99)
HDLC SERPL-MCNC: 57 MG/DL
LDLC SERPL CALC-MCNC: 132 MG/DL (ref 0–99)
LDLC/HDLC SERPL: 2.3 RATIO (ref 0–3.6)
POTASSIUM SERPL-SCNC: 5.1 MMOL/L (ref 3.5–5.2)
PROT SERPL-MCNC: 7.1 G/DL (ref 6–8.5)
SODIUM SERPL-SCNC: 141 MMOL/L (ref 134–144)
TRIGL SERPL-MCNC: 133 MG/DL (ref 0–149)
VLDLC SERPL CALC-MCNC: 24 MG/DL (ref 5–40)

## 2020-12-02 NOTE — TELEPHONE ENCOUNTER
We cannot call pharmacies every time we discontinue the medicine on the patient's record!  I told her when she was leaving yesterday I canceled the statin

## 2020-12-02 NOTE — TELEPHONE ENCOUNTER
With the group home that is what we have to do.  The Pravastatin was not canceled at the pharmacy.  They called Debbi at [facility] and they said they that the pravastatin was sent over.  If a prescription is sent in and you you want to cancel it, we have to call the pharmacy and let them know.  SG

## 2020-12-02 NOTE — TELEPHONE ENCOUNTER
Called [facility] and canceled the Rx for Pravastatin.  Also told them to D/C Hydralazine.  Debbi is aware of these changes as well.  Also faxed her the copy of pts lab results.  SG

## 2020-12-05 RX ORDER — ALENDRONATE SODIUM 70 MG/1
TABLET ORAL
Qty: 4 TABLET | Refills: 3 | Status: SHIPPED | OUTPATIENT
Start: 2020-12-05 | End: 2021-03-25

## 2020-12-05 RX ORDER — FERROUS SULFATE 325(65) MG
TABLET ORAL
Qty: 60 TABLET | Refills: 0 | Status: SHIPPED | OUTPATIENT
Start: 2020-12-05 | End: 2020-12-31

## 2020-12-05 RX ORDER — LORATADINE 10 MG/1
TABLET ORAL
Qty: 30 TABLET | Refills: 0 | Status: SHIPPED | OUTPATIENT
Start: 2020-12-05 | End: 2020-12-31

## 2020-12-05 RX ORDER — FENOFIBRATE 48 MG/1
TABLET, COATED ORAL
Qty: 30 TABLET | Refills: 0 | Status: SHIPPED | OUTPATIENT
Start: 2020-12-05 | End: 2020-12-31

## 2020-12-31 DIAGNOSIS — G40.109 SEIZURE, TEMPORAL LOBE (HCC): ICD-10-CM

## 2020-12-31 RX ORDER — FENOFIBRATE 48 MG/1
TABLET, COATED ORAL
Qty: 30 TABLET | Refills: 0 | Status: SHIPPED | OUTPATIENT
Start: 2020-12-31 | End: 2021-01-30

## 2020-12-31 RX ORDER — LORATADINE 10 MG/1
TABLET ORAL
Qty: 30 TABLET | Refills: 0 | Status: SHIPPED | OUTPATIENT
Start: 2020-12-31 | End: 2021-01-30

## 2020-12-31 RX ORDER — PHENOBARBITAL 97.2 MG/1
TABLET ORAL
Qty: 30 TABLET | Refills: 0 | Status: SHIPPED | OUTPATIENT
Start: 2020-12-31 | End: 2021-02-01

## 2020-12-31 RX ORDER — FERROUS SULFATE 325(65) MG
TABLET ORAL
Qty: 60 TABLET | Refills: 0 | Status: SHIPPED | OUTPATIENT
Start: 2020-12-31 | End: 2021-01-30

## 2021-01-12 ENCOUNTER — OFFICE VISIT (OUTPATIENT)
Dept: FAMILY MEDICINE CLINIC | Facility: CLINIC | Age: 61
End: 2021-01-12

## 2021-01-12 VITALS
TEMPERATURE: 97.8 F | SYSTOLIC BLOOD PRESSURE: 138 MMHG | OXYGEN SATURATION: 97 % | HEART RATE: 86 BPM | WEIGHT: 150.6 LBS | DIASTOLIC BLOOD PRESSURE: 76 MMHG | HEIGHT: 68 IN | BODY MASS INDEX: 22.82 KG/M2

## 2021-01-12 DIAGNOSIS — R05.9 COUGH: ICD-10-CM

## 2021-01-12 DIAGNOSIS — R53.83 FATIGUE, UNSPECIFIED TYPE: Primary | ICD-10-CM

## 2021-01-12 DIAGNOSIS — R09.81 NASAL CONGESTION: ICD-10-CM

## 2021-01-12 PROCEDURE — 99213 OFFICE O/P EST LOW 20 MIN: CPT | Performed by: NURSE PRACTITIONER

## 2021-01-12 RX ORDER — CHLORHEXIDINE GLUCONATE 0.12 MG/ML
RINSE ORAL
COMMUNITY
Start: 2020-12-15

## 2021-01-12 RX ORDER — FLUTICASONE PROPIONATE 50 MCG
2 SPRAY, SUSPENSION (ML) NASAL DAILY
Qty: 1 BOTTLE | Refills: 1 | Status: SHIPPED | OUTPATIENT
Start: 2021-01-12

## 2021-01-12 RX ORDER — AZITHROMYCIN 250 MG/1
TABLET, FILM COATED ORAL
Qty: 6 TABLET | Refills: 0 | Status: SHIPPED | OUTPATIENT
Start: 2021-01-12 | End: 2021-03-02

## 2021-01-12 NOTE — PROGRESS NOTES
"    Manuel Gaspar is a 60 y.o. male.     60-year-old white male who lives in a group home with history of hypertension, kidney stones, chronic anemia, neurogenic bladder, hearing loss with hearing aids, chronic hyponatremia chronic UTIs and seizure disorder who comes in today with 2-day history of feeling bad and a cough.  Patient had his Covid vaccine yesterday.  Went ahead and swab him for Covid and hopefully he did not have it when he did receive the vaccine.  His lungs are diminished I am placing him on a Z-Homero prophylactically.  He also has nasal congestion I am placing him on allergy medication  Blood pressure 138/76 he denies any chest pain, dyspnea or tachycardia  Weight is 151 with a BMI of 22.9    Z-Homero as directed  Claritin and Flonase as directed  Covid test quarantine till results return  Stay hydrated       The following portions of the patient's history were reviewed and updated as appropriate: allergies, current medications, past family history, past medical history, past social history, past surgical history and problem list.    Vitals:    01/12/21 1036   BP: 138/76   BP Location: Right arm   Patient Position: Sitting   Cuff Size: Adult   Pulse: 86   Temp: 97.8 °F (36.6 °C)   TempSrc: Oral   SpO2: 97%   Weight: 68.3 kg (150 lb 9.6 oz)   Height: 172.7 cm (68\")     Body mass index is 22.9 kg/m².    Past Medical History:   Diagnosis Date   • Hearing loss    • Hypertension    • Kidney stone    • Mental retardation    • Neurogenic bladder    • Seizure disorder (CMS/HCC)     onset 16-17 years old, last seizure about 2015 when weaning phenobarb     Past Surgical History:   Procedure Laterality Date   • BLADDER REPAIR     • CATARACT EXTRACTION     • ORIF PATELLA FRACTURE Left    • THORACOTOMY     • TYMPANOMASTOIDECTOMY       Family History   Family history unknown: Yes     Immunization History   Administered Date(s) Administered   • Flu Vaccine Intradermal Quad 18-64YR 10/05/2018   • Flu Vaccine Quad PF >18YRS " 10/06/2015   • Flu Vaccine Quad PF >36MO 11/23/2016, 11/12/2019   • Flulaval/Fluarix/Fluzone Quad 10/21/2020   • Td 06/13/2014       Office Visit on 12/01/2020   Component Date Value Ref Range Status   • Glucose 12/01/2020 85  65 - 99 mg/dL Final   • BUN 12/01/2020 36* 8 - 27 mg/dL Final   • Creatinine 12/01/2020 1.55* 0.76 - 1.27 mg/dL Final   • eGFR Non African Am 12/01/2020 47* >59 mL/min/1.73 Final   • eGFR African Am 12/01/2020 55* >59 mL/min/1.73 Final   • BUN/Creatinine Ratio 12/01/2020 23  10 - 24 Final   • Sodium 12/01/2020 141  134 - 144 mmol/L Final   • Potassium 12/01/2020 5.1  3.5 - 5.2 mmol/L Final   • Chloride 12/01/2020 102  96 - 106 mmol/L Final   • Total CO2 12/01/2020 26  20 - 29 mmol/L Final   • Calcium 12/01/2020 9.9  8.6 - 10.2 mg/dL Final   • Total Protein 12/01/2020 7.1  6.0 - 8.5 g/dL Final   • Albumin 12/01/2020 4.3  3.8 - 4.8 g/dL Final   • Globulin 12/01/2020 2.8  1.5 - 4.5 g/dL Final   • A/G Ratio 12/01/2020 1.5  1.2 - 2.2 Final   • Total Bilirubin 12/01/2020 0.2  0.0 - 1.2 mg/dL Final   • Alkaline Phosphatase 12/01/2020 63  39 - 117 IU/L Final   • AST (SGOT) 12/01/2020 17  0 - 40 IU/L Final   • ALT (SGPT) 12/01/2020 17  0 - 44 IU/L Final   • Total Cholesterol 12/01/2020 213* 100 - 199 mg/dL Final   • Triglycerides 12/01/2020 133  0 - 149 mg/dL Final   • HDL Cholesterol 12/01/2020 57  >39 mg/dL Final   • VLDL Cholesterol Damon 12/01/2020 24  5 - 40 mg/dL Final   • LDL Chol Calc (NIH) 12/01/2020 132* 0 - 99 mg/dL Final   • LDL/HDL RATIO 12/01/2020 2.3  0.0 - 3.6 ratio Final    Comment:                                     LDL/HDL Ratio                                              Men  Women                                1/2 Avg.Risk  1.0    1.5                                    Avg.Risk  3.6    3.2                                 2X Avg.Risk  6.2    5.0                                 3X Avg.Risk  8.0    6.1           Review of Systems   Constitutional: Positive for fatigue.   HENT:  Positive for congestion.    Respiratory: Positive for cough.    Cardiovascular: Negative.    Gastrointestinal: Negative.    Genitourinary: Negative.    Musculoskeletal: Negative.    Skin: Negative.    Neurological: Negative.    Psychiatric/Behavioral: Negative.        Objective   Physical Exam  Constitutional:       Appearance: Normal appearance.   HENT:      Head: Normocephalic.   Neck:      Musculoskeletal: Normal range of motion.   Cardiovascular:      Rate and Rhythm: Normal rate and regular rhythm.      Pulses: Normal pulses.      Heart sounds: Normal heart sounds.   Pulmonary:      Effort: Pulmonary effort is normal.      Comments: Lungs very diminished  Abdominal:      General: Bowel sounds are normal.   Musculoskeletal: Normal range of motion.   Skin:     General: Skin is warm and dry.   Neurological:      General: No focal deficit present.      Mental Status: He is alert and oriented to person, place, and time.   Psychiatric:         Mood and Affect: Mood normal.         Procedures    Assessment/Plan   Diagnoses and all orders for this visit:    1. Fatigue, unspecified type (Primary)  -     COVID-19,[facility] ROUTINE, NP/OP SWAB IN TRANSPORT MEDIA OR ESWAB 72 HR TAT - Swab, Nasopharynx    2. Cough  -     COVID-19,[facility] ROUTINE, NP/OP SWAB IN TRANSPORT MEDIA OR ESWAB 72 HR TAT - Swab, Nasopharynx    3. Nasal congestion  -     COVID-19,[facility] ROUTINE, NP/OP SWAB IN TRANSPORT MEDIA OR ESWAB 72 HR TAT - Swab, Nasopharynx    Other orders  -     fluticasone (FLONASE) 50 MCG/ACT nasal spray; 2 sprays into the nostril(s) as directed by provider Daily.  Dispense: 1 bottle; Refill: 1  -     azithromycin (Zithromax Z-Homero) 250 MG tablet; Take 2 tablets the first day, then 1 tablet daily for 4 days.  Dispense: 6 tablet; Refill: 0          Current Outpatient Medications:   •  acetaminophen (TYLENOL) 325 MG tablet, Take 2 tablets by mouth Every 4 (Four) Hours As Needed., Disp: , Rfl:   •  alendronate (FOSAMAX) 70 MG  tablet, TAKE 1 TABLET BY MOUTH WEEKLY .TAKE WITH 8OZ WATER BEFORE ANY FOOD OR DRINK. DO NOT L IE DOWN FOR 30 MINUTES, Disp: 4 tablet, Rfl: 3  •  brimonidine-timolol (COMBIGAN) 0.2-0.5 % ophthalmic solution, Administer 1 drop to both eyes 2 (Two) Times a Day., Disp: , Rfl:   •  Calcium Carb-Cholecalciferol 600-400 MG-UNIT tablet, TAKE ONE TABLET BY MOUTH TWICE DAILY, Disp: 60 tablet, Rfl: 0  •  calcium carbonate-vitamin d 600-400 MG-UNIT per tablet, TAKE ONE TABLET BY MOUTH TWICE DAILY, Disp: 60 tablet, Rfl: 0  •  chlorhexidine (PERIDEX) 0.12 % solution, , Disp: , Rfl:   •  DOCUSIL 100 MG capsule, TAKE ONE (1) CAPSULE BY MOUTH TWICE A DAY, Disp: 60 capsule, Rfl: 3  •  famotidine (PEPCID) 20 MG tablet, TAKE 1 TABLET BY MOUTH TWICE DAILY, Disp: 60 tablet, Rfl: 10  •  fenofibrate (TRICOR) 48 MG tablet, TAKE 1 TABLET BY MOUTH AT BEDTIME, Disp: 30 tablet, Rfl: 0  •  ferrous sulfate 325 (65 FE) MG tablet, TAKE ONE TABLET BY MOUTH TWICE DAILY, Disp: 60 tablet, Rfl: 0  •  loratadine (CLARITIN) 10 MG tablet, TAKE ONE TABLET BY MOUTH EVERY DAY, Disp: 30 tablet, Rfl: 0  •  MAPAP 325 MG tablet, TAKE 2 TABLETS BY MOUTH EVERY 4 HOURS AS NEEDED FOR FEVER AND PAIN ( MAX 12 DOSES IN 24 HOURS), Disp: 60 tablet, Rfl: 12  •  Neomycin-Bacitracin-Polymyxin (GNP TRIPLE ANTIBIOTIC) 3.5-400-5000 ointment, FOLLOW DIRECTIONS ON LABEL AS NEEDED TO PREVENT INFECTIONS IN MINOR CUTS AND SCRAPES. IF NO IMPROVEMENT IN 48 HOURS NOTIFY NURSE, Disp: 28.4 g, Rfl: 12  •  omeprazole (priLOSEC) 20 MG capsule, TAKE 1 CAPSULE BY MOUTH EVERY DAY AS NEEDED ( BUBBLE ), Disp: 30 capsule, Rfl: 0  •  OXcarbazepine (TRILEPTAL) 600 MG tablet, One tab in am and two at hs, Disp: 90 tablet, Rfl: 11  •  PHENobarbital (LUMINAL) 97.2 MG tablet, TAKE 1 TABLET BY MOUTH EVERY NIGHT AT BEDTIME * NARC SHEET*, Disp: 30 tablet, Rfl: 0  •  polyethylene glycol (MiraLax) 17 GM/SCOOP powder, Take 17 g by mouth Daily. Mix with water 17g daily with 8oz water prn, Disp: 1 each, Rfl:  2  •  Psyllium (Metamucil [facility] Fiber) 55.46 % powder, Take 1 teaspoon(s) by mouth 2 (Two) Times a Day. 1 tsp 1-2 x day as needed, Disp: 1 g, Rfl: 1  •  risperiDONE (risperDAL) 2 MG tablet, TAKE 1 TABLET BY MOUTH TWICE DAILY, Disp: 60 tablet, Rfl: 3  •  azithromycin (Zithromax Z-Homero) 250 MG tablet, Take 2 tablets the first day, then 1 tablet daily for 4 days., Disp: 6 tablet, Rfl: 0  •  fluticasone (FLONASE) 50 MCG/ACT nasal spray, 2 sprays into the nostril(s) as directed by provider Daily., Disp: 1 bottle, Rfl: 1

## 2021-01-12 NOTE — PATIENT INSTRUCTIONS
Take allergy medicine antibiotics as directed  Covid testing today quarantine until results return  Stay hydrated

## 2021-01-14 LAB — SARS-COV-2 RNA RESP QL NAA+PROBE: DETECTED

## 2021-01-29 DIAGNOSIS — F20.0 PARANOID SCHIZOPHRENIA (HCC): ICD-10-CM

## 2021-01-30 RX ORDER — LORATADINE 10 MG/1
TABLET ORAL
Qty: 30 TABLET | Refills: 0 | Status: SHIPPED | OUTPATIENT
Start: 2021-01-30 | End: 2021-02-25

## 2021-01-30 RX ORDER — FENOFIBRATE 48 MG/1
TABLET, COATED ORAL
Qty: 30 TABLET | Refills: 0 | Status: SHIPPED | OUTPATIENT
Start: 2021-01-30 | End: 2021-02-25

## 2021-01-30 RX ORDER — RISPERIDONE 2 MG/1
TABLET ORAL
Qty: 60 TABLET | Refills: 5 | Status: SHIPPED | OUTPATIENT
Start: 2021-01-30 | End: 2021-07-15

## 2021-01-30 RX ORDER — FERROUS SULFATE 325(65) MG
TABLET ORAL
Qty: 60 TABLET | Refills: 0 | Status: SHIPPED | OUTPATIENT
Start: 2021-01-30 | End: 2021-02-25

## 2021-02-01 DIAGNOSIS — G40.109 SEIZURE, TEMPORAL LOBE (HCC): ICD-10-CM

## 2021-02-01 RX ORDER — PHENOBARBITAL 97.2 MG/1
TABLET ORAL
Qty: 30 TABLET | Refills: 0 | Status: SHIPPED | OUTPATIENT
Start: 2021-02-01 | End: 2021-02-25

## 2021-02-08 RX ORDER — ACETAMINOPHEN 325 MG/1
TABLET ORAL
Qty: 60 TABLET | Refills: 11 | Status: SHIPPED | OUTPATIENT
Start: 2021-02-08 | End: 2022-03-30

## 2021-02-25 DIAGNOSIS — G40.109 SEIZURE, TEMPORAL LOBE (HCC): ICD-10-CM

## 2021-02-25 RX ORDER — OXCARBAZEPINE 600 MG/1
TABLET, FILM COATED ORAL
Qty: 90 TABLET | Refills: 10 | Status: SHIPPED | OUTPATIENT
Start: 2021-02-25 | End: 2021-03-03 | Stop reason: SDUPTHER

## 2021-02-25 RX ORDER — FENOFIBRATE 48 MG/1
TABLET, COATED ORAL
Qty: 30 TABLET | Refills: 0 | Status: SHIPPED | OUTPATIENT
Start: 2021-02-25 | End: 2021-03-25

## 2021-02-25 RX ORDER — PHENOBARBITAL 97.2 MG/1
TABLET ORAL
Qty: 30 TABLET | Refills: 0 | Status: SHIPPED | OUTPATIENT
Start: 2021-02-25 | End: 2021-03-03 | Stop reason: SDUPTHER

## 2021-02-25 RX ORDER — FERROUS SULFATE 325(65) MG
TABLET ORAL
Qty: 60 TABLET | Refills: 0 | Status: SHIPPED | OUTPATIENT
Start: 2021-02-25 | End: 2021-03-25

## 2021-02-25 RX ORDER — LORATADINE 10 MG/1
TABLET ORAL
Qty: 30 TABLET | Refills: 0 | Status: SHIPPED | OUTPATIENT
Start: 2021-02-25 | End: 2021-03-25

## 2021-03-02 ENCOUNTER — OFFICE VISIT (OUTPATIENT)
Dept: FAMILY MEDICINE CLINIC | Facility: CLINIC | Age: 61
End: 2021-03-02

## 2021-03-02 VITALS
WEIGHT: 152.2 LBS | OXYGEN SATURATION: 95 % | TEMPERATURE: 97.3 F | DIASTOLIC BLOOD PRESSURE: 77 MMHG | BODY MASS INDEX: 23.07 KG/M2 | HEIGHT: 68 IN | SYSTOLIC BLOOD PRESSURE: 114 MMHG | HEART RATE: 72 BPM

## 2021-03-02 DIAGNOSIS — G40.909 SEIZURE DISORDER (HCC): ICD-10-CM

## 2021-03-02 DIAGNOSIS — R76.8 POSITIVE HEPATITIS C ANTIBODY TEST: Primary | ICD-10-CM

## 2021-03-02 DIAGNOSIS — R79.89 SERUM CREATININE RAISED: ICD-10-CM

## 2021-03-02 PROCEDURE — 99213 OFFICE O/P EST LOW 20 MIN: CPT | Performed by: NURSE PRACTITIONER

## 2021-03-02 NOTE — PROGRESS NOTES
"    Manuel Gaspar is a 60 y.o. male.     60-year-old white male lives in a group home with history of hypertension, kidney stones, chronic anemia, neurogenic bladder, hearing loss with hearing aids, chronic hyponatremia, chronic UTIs and seizure disorder who  comes in today for 3-month follow-up visit  Blood pressure 114/76 tachycardia or dizziness  heart rate 72 denies any chest pain, dyspnea,  Patient had hep C was treated by gastroenterology and they are requesting a hep C level  Patient has upcoming appointment with neurology for seizure disorder and I will be checking phenobarbital level today  Patient has mild renal failure last creatinine 1.34 will be checking that as well  Weight is 152 with a BMI of 23.1  Patient did have Covid but has not had any lingering side effects    Blood work today  Follow-up in May for annual visit       The following portions of the patient's history were reviewed and updated as appropriate: allergies, current medications, past family history, past medical history, past social history, past surgical history and problem list.    Vitals:    03/02/21 0939   BP: 114/77   BP Location: Right arm   Patient Position: Sitting   Cuff Size: Adult   Pulse: 72   Temp: 97.3 °F (36.3 °C)   TempSrc: Temporal   SpO2: 95%   Weight: 69 kg (152 lb 3.2 oz)   Height: 172.7 cm (68\")     Body mass index is 23.14 kg/m².    Past Medical History:   Diagnosis Date   • Hearing loss    • Hypertension    • Kidney stone    • Mental retardation    • Neurogenic bladder    • Seizure disorder (CMS/HCC)     onset 16-17 years old, last seizure about 2015 when weaning phenobarb     Past Surgical History:   Procedure Laterality Date   • BLADDER REPAIR     • CATARACT EXTRACTION     • ORIF PATELLA FRACTURE Left    • THORACOTOMY     • TYMPANOMASTOIDECTOMY       Family History   Family history unknown: Yes     Immunization History   Administered Date(s) Administered   • Flu Vaccine Intradermal Quad 18-64YR 10/05/2018   • Flu " Vaccine Quad PF >18YRS 10/06/2015   • Flu Vaccine Quad PF >36MO 11/23/2016, 11/12/2019   • Flulaval/Fluarix/Fluzone Quad 10/21/2020   • Td 06/13/2014       Office Visit on 01/12/2021   Component Date Value Ref Range Status   • SARS-CoV-2, FABY 01/12/2021 Detected* Not Detected Final    Comment: This nucleic acid amplification test was developed and its performance  characteristics determined by [facility]. Nucleic acid  amplification tests include PCR and TMA. This test has not been FDA  cleared or approved. This test has been authorized by FDA under an  Emergency Use Authorization (EUA). This test is only authorized for  the duration of time the declaration that circumstances exist  justifying the authorization of the emergency use of in vitro  diagnostic tests for detection of SARS-CoV-2 virus and/or diagnosis  of COVID-19 infection under section 564(b)(1) of the Act, 21 U.S.C.  360bbb-3(b) (1), unless the authorization is terminated or revoked  sooner.  When diagnostic testing is negative, the possibility of a false  negative result should be considered in the context of a patient's  recent exposures and the presence of clinical signs and symptoms  consistent with COVID-19. An individual without symptoms of COVID-19  and who is not shedding SARS-CoV-2 virus would                            expect to have a  negative (not detected) result in this assay.           Review of Systems   Constitutional: Negative.    HENT: Negative.    Respiratory: Negative.    Cardiovascular: Negative.    Gastrointestinal: Negative.    Genitourinary: Negative.    Musculoskeletal: Negative.    Skin: Negative.    Neurological: Negative.    Psychiatric/Behavioral: Negative.        Objective   Physical Exam  HENT:      Head: Normocephalic.   Neck:      Musculoskeletal: Normal range of motion.   Cardiovascular:      Rate and Rhythm: Normal rate and regular rhythm.      Pulses: Normal pulses.      Heart sounds: Normal heart sounds.    Pulmonary:      Effort: Pulmonary effort is normal.      Breath sounds: Normal breath sounds.   Abdominal:      General: Bowel sounds are normal.   Musculoskeletal: Normal range of motion.   Skin:     General: Skin is warm and dry.   Neurological:      General: No focal deficit present.      Mental Status: He is alert and oriented to person, place, and time.   Psychiatric:         Mood and Affect: Mood normal.         Procedures    Assessment/Plan   Diagnoses and all orders for this visit:    1. Positive hepatitis C antibody test (Primary)  -     Hepatitis C antibody; Future    2. Serum creatinine raised  -     Basic Metabolic Panel    3. Seizure disorder (CMS/HCC)  -     Phenobarbital level; Future    4. BMI 23.0-23.9, adult          Current Outpatient Medications:   •  acetaminophen (TYLENOL) 325 MG tablet, Take 2 tablets by mouth Every 4 (Four) Hours As Needed., Disp: , Rfl:   •  alendronate (FOSAMAX) 70 MG tablet, TAKE 1 TABLET BY MOUTH WEEKLY .TAKE WITH 8OZ WATER BEFORE ANY FOOD OR DRINK. DO NOT L IE DOWN FOR 30 MINUTES, Disp: 4 tablet, Rfl: 3  •  brimonidine-timolol (COMBIGAN) 0.2-0.5 % ophthalmic solution, Administer 1 drop to both eyes 2 (Two) Times a Day., Disp: , Rfl:   •  Calcium Carb-Cholecalciferol 600-400 MG-UNIT tablet, TAKE ONE TABLET BY MOUTH TWICE DAILY, Disp: 60 tablet, Rfl: 0  •  calcium carbonate-vitamin d 600-400 MG-UNIT per tablet, TAKE ONE TABLET BY MOUTH TWICE DAILY, Disp: 60 tablet, Rfl: 0  •  chlorhexidine (PERIDEX) 0.12 % solution, , Disp: , Rfl:   •  DOCUSIL 100 MG capsule, TAKE ONE (1) CAPSULE BY MOUTH TWICE A DAY, Disp: 60 capsule, Rfl: 3  •  famotidine (PEPCID) 20 MG tablet, TAKE 1 TABLET BY MOUTH TWICE DAILY, Disp: 60 tablet, Rfl: 10  •  fenofibrate (TRICOR) 48 MG tablet, TAKE 1 TABLET BY MOUTH AT BEDTIME, Disp: 30 tablet, Rfl: 0  •  ferrous sulfate 325 (65 FE) MG tablet, TAKE ONE TABLET BY MOUTH TWICE DAILY, Disp: 60 tablet, Rfl: 0  •  fluticasone (FLONASE) 50 MCG/ACT nasal spray,  2 sprays into the nostril(s) as directed by provider Daily., Disp: 1 bottle, Rfl: 1  •  loratadine (CLARITIN) 10 MG tablet, TAKE ONE TABLET BY MOUTH EVERY DAY, Disp: 30 tablet, Rfl: 0  •  Mapap 325 MG tablet, TAKE 2 TABLETS BY MOUTH EVERY 4 HOURS AS NEEDED FOR FEVER AND PAIN ( MAX 12 DOSES IN 24 HOURS), Disp: 60 tablet, Rfl: 11  •  Neomycin-Bacitracin-Polymyxin (GNP TRIPLE ANTIBIOTIC) 3.5-400-5000 ointment, FOLLOW DIRECTIONS ON LABEL AS NEEDED TO PREVENT INFECTIONS IN MINOR CUTS AND SCRAPES. IF NO IMPROVEMENT IN 48 HOURS NOTIFY NURSE, Disp: 28.4 g, Rfl: 12  •  omeprazole (priLOSEC) 20 MG capsule, TAKE 1 CAPSULE BY MOUTH EVERY DAY AS NEEDED ( BUBBLE ), Disp: 30 capsule, Rfl: 0  •  OXcarbazepine (TRILEPTAL) 600 MG tablet, TAKE 1 TABLET BY MOUTH EVERY MORNING AND 2 TABLETS EVERY EVENING, Disp: 90 tablet, Rfl: 10  •  PHENobarbital (LUMINAL) 97.2 MG tablet, TAKE 1 TABLET BY MOUTH EVERY NIGHT AT BEDTIME * NARC SHEET*, Disp: 30 tablet, Rfl: 0  •  polyethylene glycol (MiraLax) 17 GM/SCOOP powder, Take 17 g by mouth Daily. Mix with water 17g daily with 8oz water prn, Disp: 1 each, Rfl: 2  •  Psyllium (Metamucil [facility] Fiber) 55.46 % powder, Take 1 teaspoon(s) by mouth 2 (Two) Times a Day. 1 tsp 1-2 x day as needed, Disp: 1 g, Rfl: 1  •  risperiDONE (risperDAL) 2 MG tablet, TAKE 1 TABLET BY MOUTH TWICE DAILY, Disp: 60 tablet, Rfl: 5

## 2021-03-02 NOTE — PATIENT INSTRUCTIONS
60-year-old white male lives in a group home with history of hypertension, kidney stones, chronic anemia, neurogenic bladder, hearing loss with hearing aids, chronic hyponatremia, chronic UTIs and seizure disorder who  comes in today for 3-month follow-up visit  Blood pressure 114/76 tachycardia or dizziness  heart rate 72 denies any chest pain, dyspnea,  Patient had hep C was treated by gastroenterology and they are requesting a hep C level  Patient has upcoming appointment with neurology for seizure disorder and I will be checking phenobarbital level today  Patient has mild renal failure last creatinine 1.34 will be checking that as well  Weight is 152 with a BMI of 23.1  Patient did have Covid but has not had any lingering side effects    Blood work today  Follow-up in May for annual visit

## 2021-03-03 ENCOUNTER — TELEPHONE (OUTPATIENT)
Dept: FAMILY MEDICINE CLINIC | Facility: CLINIC | Age: 61
End: 2021-03-03

## 2021-03-03 ENCOUNTER — OFFICE VISIT (OUTPATIENT)
Dept: NEUROLOGY | Facility: CLINIC | Age: 61
End: 2021-03-03

## 2021-03-03 VITALS
BODY MASS INDEX: 23.01 KG/M2 | WEIGHT: 151.8 LBS | TEMPERATURE: 97.5 F | SYSTOLIC BLOOD PRESSURE: 138 MMHG | HEIGHT: 68 IN | DIASTOLIC BLOOD PRESSURE: 84 MMHG | HEART RATE: 67 BPM

## 2021-03-03 DIAGNOSIS — F79 INTELLECTUAL DISABILITY: ICD-10-CM

## 2021-03-03 DIAGNOSIS — F20.0 PARANOID SCHIZOPHRENIA (HCC): ICD-10-CM

## 2021-03-03 DIAGNOSIS — G40.219 PARTIAL EPILEPSY WITH IMPAIRMENT OF CONSCIOUSNESS, INTRACTABLE (HCC): Primary | ICD-10-CM

## 2021-03-03 DIAGNOSIS — G40.109 SEIZURE, TEMPORAL LOBE (HCC): ICD-10-CM

## 2021-03-03 LAB
BUN SERPL-MCNC: 39 MG/DL (ref 8–27)
BUN/CREAT SERPL: 25 (ref 10–24)
CALCIUM SERPL-MCNC: 10.4 MG/DL (ref 8.6–10.2)
CHLORIDE SERPL-SCNC: 104 MMOL/L (ref 96–106)
CO2 SERPL-SCNC: 25 MMOL/L (ref 20–29)
CREAT SERPL-MCNC: 1.59 MG/DL (ref 0.76–1.27)
GLUCOSE SERPL-MCNC: 98 MG/DL (ref 65–99)
POTASSIUM SERPL-SCNC: 4.6 MMOL/L (ref 3.5–5.2)
SODIUM SERPL-SCNC: 143 MMOL/L (ref 134–144)

## 2021-03-03 PROCEDURE — 99213 OFFICE O/P EST LOW 20 MIN: CPT | Performed by: PSYCHIATRY & NEUROLOGY

## 2021-03-03 RX ORDER — OXCARBAZEPINE 600 MG/1
TABLET, FILM COATED ORAL
Qty: 90 TABLET | Refills: 11 | Status: SHIPPED | OUTPATIENT
Start: 2021-03-03 | End: 2022-02-28

## 2021-03-03 RX ORDER — PHENOBARBITAL 97.2 MG/1
TABLET ORAL
Qty: 30 TABLET | Refills: 5 | Status: SHIPPED | OUTPATIENT
Start: 2021-03-03 | End: 2021-09-14

## 2021-03-03 NOTE — TELEPHONE ENCOUNTER
Caller: SIMBA    Relationship to patient: Other    Best call back number: [phone]    Patient is needing: WOULD LIKE TEST RESULTS FROM YESTERDAY.      [phone]

## 2021-03-03 NOTE — PROGRESS NOTES
Chief Complaint  Seizures (yearly f/u )    Subjective          Manuel Gaspar presents to [facility] for   Yearly f/u,    Partial epilepsy with impairment of consciousness, no seizures doing well with Phenobarbital 97.2 mg 1 qd and Oxcarbazepine 600  Mg 1 tab in the am and 2 tabs at hs. Patient had apt with his PCP and she checked his levels but hasn't come back yet. Patient had Covid a couple months ago and did well with recovering. No residual he's always tiered he's here today with his caregiver.           Current Outpatient Medications:   •  acetaminophen (TYLENOL) 325 MG tablet, Take 2 tablets by mouth Every 4 (Four) Hours As Needed., Disp: , Rfl:   •  alendronate (FOSAMAX) 70 MG tablet, TAKE 1 TABLET BY MOUTH WEEKLY .TAKE WITH 8OZ WATER BEFORE ANY FOOD OR DRINK. DO NOT L IE DOWN FOR 30 MINUTES, Disp: 4 tablet, Rfl: 3  •  brimonidine-timolol (COMBIGAN) 0.2-0.5 % ophthalmic solution, Administer 1 drop to both eyes 2 (Two) Times a Day., Disp: , Rfl:   •  Calcium Carb-Cholecalciferol 600-400 MG-UNIT tablet, TAKE ONE TABLET BY MOUTH TWICE DAILY, Disp: 60 tablet, Rfl: 0  •  calcium carbonate-vitamin d 600-400 MG-UNIT per tablet, TAKE ONE TABLET BY MOUTH TWICE DAILY, Disp: 60 tablet, Rfl: 0  •  chlorhexidine (PERIDEX) 0.12 % solution, , Disp: , Rfl:   •  DOCUSIL 100 MG capsule, TAKE ONE (1) CAPSULE BY MOUTH TWICE A DAY, Disp: 60 capsule, Rfl: 3  •  famotidine (PEPCID) 20 MG tablet, TAKE 1 TABLET BY MOUTH TWICE DAILY, Disp: 60 tablet, Rfl: 10  •  fenofibrate (TRICOR) 48 MG tablet, TAKE 1 TABLET BY MOUTH AT BEDTIME, Disp: 30 tablet, Rfl: 0  •  ferrous sulfate 325 (65 FE) MG tablet, TAKE ONE TABLET BY MOUTH TWICE DAILY, Disp: 60 tablet, Rfl: 0  •  fluticasone (FLONASE) 50 MCG/ACT nasal spray, 2 sprays into the nostril(s) as directed by provider Daily., Disp: 1 bottle, Rfl: 1  •  loratadine (CLARITIN) 10 MG tablet, TAKE ONE TABLET BY MOUTH EVERY DAY, Disp: 30 tablet, Rfl: 0  •  Mapap 325 MG tablet,  TAKE 2 TABLETS BY MOUTH EVERY 4 HOURS AS NEEDED FOR FEVER AND PAIN ( MAX 12 DOSES IN 24 HOURS), Disp: 60 tablet, Rfl: 11  •  Neomycin-Bacitracin-Polymyxin (GNP TRIPLE ANTIBIOTIC) 3.5-400-5000 ointment, FOLLOW DIRECTIONS ON LABEL AS NEEDED TO PREVENT INFECTIONS IN MINOR CUTS AND SCRAPES. IF NO IMPROVEMENT IN 48 HOURS NOTIFY NURSE, Disp: 28.4 g, Rfl: 12  •  omeprazole (priLOSEC) 20 MG capsule, TAKE 1 CAPSULE BY MOUTH EVERY DAY AS NEEDED ( BUBBLE ), Disp: 30 capsule, Rfl: 0  •  OXcarbazepine (TRILEPTAL) 600 MG tablet, TAKE 1 TABLET BY MOUTH EVERY MORNING AND 2 TABLETS EVERY EVENING, Disp: 90 tablet, Rfl: 11  •  PHENobarbital (LUMINAL) 97.2 MG tablet, One tab at hs, Disp: 30 tablet, Rfl: 5  •  polyethylene glycol (MiraLax) 17 GM/SCOOP powder, Take 17 g by mouth Daily. Mix with water 17g daily with 8oz water prn, Disp: 1 each, Rfl: 2  •  Psyllium (Metamucil [employer] Fiber) 55.46 % powder, Take 1 teaspoon(s) by mouth 2 (Two) Times a Day. 1 tsp 1-2 x day as needed, Disp: 1 g, Rfl: 1  •  risperiDONE (risperDAL) 2 MG tablet, TAKE 1 TABLET BY MOUTH TWICE DAILY, Disp: 60 tablet, Rfl: 5    Review of Systems   Constitutional: Positive for fatigue. Negative for appetite change.   HENT: Positive for hearing loss. Negative for sinus pressure and sinus pain.    Eyes: Negative for pain and itching.   Respiratory: Negative for cough and shortness of breath.    Gastrointestinal: Positive for constipation. Negative for diarrhea.   Endocrine: Negative for cold intolerance and heat intolerance.   Genitourinary: Negative for difficulty urinating and frequency.   Musculoskeletal: Negative for back pain and neck pain.   Allergic/Immunologic: Positive for environmental allergies. Negative for food allergies.   Neurological: Negative for dizziness, tremors, seizures, syncope, facial asymmetry, speech difficulty, weakness, light-headedness, numbness and headaches.   Psychiatric/Behavioral: Negative for agitation and confusion.     "      Objective:    Vital Signs:   /84 (BP Location: Left arm, Patient Position: Sitting, Cuff Size: Adult)   Pulse 67   Temp 97.5 °F (36.4 °C)   Ht 172.7 cm (68\")   Wt 68.9 kg (151 lb 12.8 oz)   BMI 23.08 kg/m²     Physical Exam  Vitals signs reviewed.   Neurological:      General: No focal deficit present.      Mental Status: He is alert and oriented to person, place, and time.   Psychiatric:         Mood and Affect: Mood normal.        Result Review :                Neurologic Exam     Mental Status   Oriented to person, place, and time.         Assessment and Plan    Diagnoses and all orders for this visit:    1. Partial epilepsy with impairment of consciousness, intractable (CMS/HCC) (Primary)    2. Intellectual disability    3. Paranoid schizophrenia (CMS/HCC)    4. Seizure, temporal lobe (CMS/HCC)  -     PHENobarbital (LUMINAL) 97.2 MG tablet; One tab at hs  Dispense: 30 tablet; Refill: 5    Other orders  -     OXcarbazepine (TRILEPTAL) 600 MG tablet; TAKE 1 TABLET BY MOUTH EVERY MORNING AND 2 TABLETS EVERY EVENING  Dispense: 90 tablet; Refill: 11     continue current medications   Reviewed labs, na level is normal.       Follow Up   Return in about 1 year (around 3/3/2022).  Patient was given instructions and counseling regarding his condition or for health maintenance advice. Please see specific information pulled into the AVS if appropriate.             This document has been electronically signed by Joseph Seipel, MD on March 3, 2021 11:05 EST      "

## 2021-03-25 RX ORDER — ALENDRONATE SODIUM 70 MG/1
TABLET ORAL
Qty: 4 TABLET | Refills: 2 | Status: SHIPPED | OUTPATIENT
Start: 2021-03-25 | End: 2021-06-21

## 2021-03-25 RX ORDER — FERROUS SULFATE 325(65) MG
TABLET ORAL
Qty: 60 TABLET | Refills: 0 | Status: SHIPPED | OUTPATIENT
Start: 2021-03-25 | End: 2021-04-22

## 2021-03-25 RX ORDER — LORATADINE 10 MG/1
TABLET ORAL
Qty: 30 TABLET | Refills: 0 | Status: SHIPPED | OUTPATIENT
Start: 2021-03-25 | End: 2021-04-22

## 2021-03-25 RX ORDER — FENOFIBRATE 48 MG/1
TABLET, COATED ORAL
Qty: 30 TABLET | Refills: 0 | Status: SHIPPED | OUTPATIENT
Start: 2021-03-25 | End: 2021-04-22

## 2021-04-22 RX ORDER — FERROUS SULFATE 325(65) MG
TABLET ORAL
Qty: 60 TABLET | Refills: 0 | Status: SHIPPED | OUTPATIENT
Start: 2021-04-22 | End: 2021-05-23

## 2021-04-22 RX ORDER — LORATADINE 10 MG/1
TABLET ORAL
Qty: 30 TABLET | Refills: 0 | Status: SHIPPED | OUTPATIENT
Start: 2021-04-22 | End: 2021-05-23

## 2021-04-22 RX ORDER — FENOFIBRATE 48 MG/1
TABLET, COATED ORAL
Qty: 30 TABLET | Refills: 0 | Status: SHIPPED | OUTPATIENT
Start: 2021-04-22 | End: 2021-05-23

## 2021-05-23 RX ORDER — LORATADINE 10 MG/1
TABLET ORAL
Qty: 30 TABLET | Refills: 0 | Status: SHIPPED | OUTPATIENT
Start: 2021-05-23 | End: 2021-06-21

## 2021-05-23 RX ORDER — FENOFIBRATE 48 MG/1
TABLET, COATED ORAL
Qty: 30 TABLET | Refills: 0 | Status: SHIPPED | OUTPATIENT
Start: 2021-05-23 | End: 2021-06-21

## 2021-05-23 RX ORDER — FERROUS SULFATE 325(65) MG
TABLET ORAL
Qty: 60 TABLET | Refills: 0 | Status: SHIPPED | OUTPATIENT
Start: 2021-05-23 | End: 2021-06-21

## 2021-06-03 ENCOUNTER — OFFICE VISIT (OUTPATIENT)
Dept: FAMILY MEDICINE CLINIC | Facility: CLINIC | Age: 61
End: 2021-06-03

## 2021-06-03 VITALS
DIASTOLIC BLOOD PRESSURE: 72 MMHG | TEMPERATURE: 97.3 F | BODY MASS INDEX: 23.46 KG/M2 | WEIGHT: 154.8 LBS | HEART RATE: 124 BPM | SYSTOLIC BLOOD PRESSURE: 107 MMHG | HEIGHT: 68 IN | OXYGEN SATURATION: 96 %

## 2021-06-03 DIAGNOSIS — E87.1 HYPONATREMIA: Primary | ICD-10-CM

## 2021-06-03 DIAGNOSIS — R63.4 WEIGHT LOSS, NON-INTENTIONAL: ICD-10-CM

## 2021-06-03 DIAGNOSIS — N30.01 ACUTE CYSTITIS WITH HEMATURIA: ICD-10-CM

## 2021-06-03 DIAGNOSIS — R82.90 UNSPECIFIED ABNORMAL FINDINGS IN URINE: ICD-10-CM

## 2021-06-03 DIAGNOSIS — M81.0 OSTEOPOROSIS WITHOUT CURRENT PATHOLOGICAL FRACTURE, UNSPECIFIED OSTEOPOROSIS TYPE: ICD-10-CM

## 2021-06-03 DIAGNOSIS — N18.9 ANEMIA DUE TO CHRONIC KIDNEY DISEASE, UNSPECIFIED CKD STAGE: ICD-10-CM

## 2021-06-03 DIAGNOSIS — E78.00 HIGH CHOLESTEROL: ICD-10-CM

## 2021-06-03 DIAGNOSIS — M81.8 OTHER OSTEOPOROSIS WITHOUT CURRENT PATHOLOGICAL FRACTURE: ICD-10-CM

## 2021-06-03 DIAGNOSIS — D63.1 ANEMIA DUE TO CHRONIC KIDNEY DISEASE, UNSPECIFIED CKD STAGE: ICD-10-CM

## 2021-06-03 LAB
BILIRUB BLD-MCNC: NEGATIVE MG/DL
CLARITY, POC: CLEAR
COLOR UR: YELLOW
GLUCOSE UR STRIP-MCNC: NEGATIVE MG/DL
KETONES UR QL: NEGATIVE
LEUKOCYTE EST, POC: ABNORMAL
NITRITE UR-MCNC: NEGATIVE MG/ML
PH UR: 8 [PH] (ref 5–8)
PROT UR STRIP-MCNC: ABNORMAL MG/DL
RBC # UR STRIP: ABNORMAL /UL
SP GR UR: 1.01 (ref 1–1.03)
UROBILINOGEN UR QL: NORMAL

## 2021-06-03 PROCEDURE — 81003 URINALYSIS AUTO W/O SCOPE: CPT | Performed by: NURSE PRACTITIONER

## 2021-06-03 PROCEDURE — 99214 OFFICE O/P EST MOD 30 MIN: CPT | Performed by: NURSE PRACTITIONER

## 2021-06-03 RX ORDER — CEPHALEXIN 500 MG/1
500 CAPSULE ORAL 2 TIMES DAILY
Qty: 20 CAPSULE | Refills: 0 | Status: SHIPPED | OUTPATIENT
Start: 2021-06-03 | End: 2021-06-10

## 2021-06-03 NOTE — PROGRESS NOTES
"    Manuel Gaspar is a 61 y.o. male.     63-year-old white male who lives in a group home with history of hypertension, kidney stones, chronic anemia, neurogenic bladder, hearing loss with hearing aids, chronic hyponatremia, chronic UTIs and seizure disorder who comes in today for his annual visit and fasting blood work  Blood pressure 106/72 heart rate 78 he denies any chest pain, dyspnea, tachycardia or dizziness  Patient has no complaints however during his annual urine UTI was found on placing him on antibiotics.  Patient really does not voice a lot of complaints and is hard to tell when he is having symptoms of UTI  Patient denies any pain and on pain scale of 1-10 he is a 0  Weight is an issue with this patient trying to keep his weight on.  He has lost weight on last visit but has gained back 4 pounds with a BMI 23.5 and they are encouraging snacks and making sure patient eats everything on his plate because he tends to do a lot of talking and does not always eat everything  Patient up-to-date on eye exam, he goes to urology for his PSAs and he has a colonoscopy scheduled next week.  Patient has had both Covid vaccines    Fasting blood work   Keflex 500 mg twice daily x10 days  DEXA scan in August  Continue to monitor weight encourage snacks  Follow-up 3 months       The following portions of the patient's history were reviewed and updated as appropriate: allergies, current medications, past family history, past medical history, past social history, past surgical history and problem list.    Vitals:    06/03/21 0811   BP: 107/72   BP Location: Right arm   Patient Position: Sitting   Cuff Size: Adult   Pulse: (!) 124   Temp: 97.3 °F (36.3 °C)   TempSrc: Infrared   SpO2: 96%   Weight: 70.2 kg (154 lb 12.8 oz)   Height: 172.7 cm (67.99\")     Body mass index is 23.54 kg/m².    Past Medical History:   Diagnosis Date   • Hearing loss    • Hypertension    • Kidney stone    • Mental retardation    • Neurogenic bladder  "   • Seizure disorder (CMS/HCC)     onset 16-17 years old, last seizure about 2015 when weaning phenobarb     Past Surgical History:   Procedure Laterality Date   • BLADDER REPAIR     • CATARACT EXTRACTION     • ORIF PATELLA FRACTURE Left    • THORACOTOMY     • TYMPANOMASTOIDECTOMY       Family History   Family history unknown: Yes     Immunization History   Administered Date(s) Administered   • Flu Vaccine Intradermal Quad 18-64YR 10/05/2018   • Flu Vaccine Quad PF >18YRS 10/06/2015   • Flu Vaccine Quad PF >36MO 11/23/2016, 11/12/2019   • Flulaval/Fluarix/Fluzone Quad 10/21/2020   • Td 06/13/2014       Results Encounter on 03/07/2021   Component Date Value Ref Range Status   • Phenobarbital 03/04/2021 26  15 - 40 ug/mL Final                                    Detection Limit = 3   • Hep C Virus Ab 03/04/2021 >11.0* 0.0 - 0.9 s/co ratio Final    Comment:                                   Negative:     < 0.8                               Indeterminate: 0.8 - 0.9                                    Positive:     > 0.9   The CDC recommends that a positive HCV antibody result   be followed up with a HCV Nucleic Acid Amplification   test ([id number]).           Review of Systems   Constitutional: Negative.    HENT: Negative.    Respiratory: Negative.    Cardiovascular: Negative.    Gastrointestinal: Negative.    Genitourinary: Negative.    Musculoskeletal: Negative.    Skin: Negative.    Neurological: Negative.    Psychiatric/Behavioral: Negative.        Objective   Physical Exam  Constitutional:       Appearance: Normal appearance.   Cardiovascular:      Rate and Rhythm: Normal rate and regular rhythm.      Pulses: Normal pulses.      Heart sounds: Normal heart sounds.   Pulmonary:      Effort: Pulmonary effort is normal.      Breath sounds: Normal breath sounds.   Abdominal:      General: Bowel sounds are normal.   Musculoskeletal:         General: Normal range of motion.      Cervical back: Normal range of motion.   Skin:      General: Skin is warm and dry.   Neurological:      General: No focal deficit present.      Mental Status: He is alert and oriented to person, place, and time.   Psychiatric:         Mood and Affect: Mood normal.         Behavior: Behavior normal.         Procedures    Assessment/Plan   Diagnoses and all orders for this visit:    1. Hyponatremia (Primary)  -     Comprehensive Metabolic Panel; Future  -     POC Urinalysis Dipstick, Automated  -     Urine Culture - Urine, Urine, Clean Catch    2. High cholesterol  -     Lipid Panel With LDL / HDL Ratio; Future    3. Anemia due to chronic kidney disease, unspecified CKD stage  -     CBC & Differential; Future    4. Unspecified abnormal findings in urine   -     Urine Culture - Urine, Urine, Clean Catch    5. Weight loss, non-intentional    6. BMI 23.0-23.9, adult    7. Acute cystitis with hematuria    Other orders  -     cephalexin (KEFLEX) 500 MG capsule; Take 1 capsule by mouth 2 (Two) Times a Day for 7 days.  Dispense: 20 capsule; Refill: 0  -     DEXA Bone Density Axial          Current Outpatient Medications:   •  acetaminophen (TYLENOL) 325 MG tablet, Take 2 tablets by mouth Every 4 (Four) Hours As Needed., Disp: , Rfl:   •  alendronate (FOSAMAX) 70 MG tablet, TAKE 1 TABLET BY MOUTH WEEKLY .TAKE WITH 8OZ WATER BEFORE ANY FOOD OR DRINK. DO NOT L IE DOWN FOR 30 MINUTES, Disp: 4 tablet, Rfl: 2  •  brimonidine-timolol (COMBIGAN) 0.2-0.5 % ophthalmic solution, Administer 1 drop to both eyes 2 (Two) Times a Day., Disp: , Rfl:   •  Calcium Carb-Cholecalciferol 600-400 MG-UNIT tablet, TAKE ONE TABLET BY MOUTH TWICE DAILY, Disp: 60 tablet, Rfl: 0  •  calcium carbonate-vitamin d 600-400 MG-UNIT per tablet, TAKE ONE TABLET BY MOUTH TWICE DAILY, Disp: 60 tablet, Rfl: 0  •  chlorhexidine (PERIDEX) 0.12 % solution, , Disp: , Rfl:   •  DOCUSIL 100 MG capsule, TAKE ONE (1) CAPSULE BY MOUTH TWICE A DAY, Disp: 60 capsule, Rfl: 3  •  famotidine (PEPCID) 20 MG tablet, TAKE 1  TABLET BY MOUTH TWICE DAILY, Disp: 60 tablet, Rfl: 10  •  fenofibrate (TRICOR) 48 MG tablet, TAKE 1 TABLET BY MOUTH AT BEDTIME, Disp: 30 tablet, Rfl: 0  •  FeroSul 325 (65 Fe) MG tablet, TAKE ONE TABLET BY MOUTH TWICE DAILY, Disp: 60 tablet, Rfl: 0  •  fluticasone (FLONASE) 50 MCG/ACT nasal spray, 2 sprays into the nostril(s) as directed by provider Daily., Disp: 1 bottle, Rfl: 1  •  loratadine (CLARITIN) 10 MG tablet, TAKE ONE TABLET BY MOUTH EVERY DAY, Disp: 30 tablet, Rfl: 0  •  Mapap 325 MG tablet, TAKE 2 TABLETS BY MOUTH EVERY 4 HOURS AS NEEDED FOR FEVER AND PAIN ( MAX 12 DOSES IN 24 HOURS), Disp: 60 tablet, Rfl: 11  •  Neomycin-Bacitracin-Polymyxin (GNP TRIPLE ANTIBIOTIC) 3.5-400-5000 ointment, FOLLOW DIRECTIONS ON LABEL AS NEEDED TO PREVENT INFECTIONS IN MINOR CUTS AND SCRAPES. IF NO IMPROVEMENT IN 48 HOURS NOTIFY NURSE, Disp: 28.4 g, Rfl: 12  •  omeprazole (priLOSEC) 20 MG capsule, TAKE 1 CAPSULE BY MOUTH EVERY DAY AS NEEDED ( BUBBLE ), Disp: 30 capsule, Rfl: 0  •  OXcarbazepine (TRILEPTAL) 600 MG tablet, TAKE 1 TABLET BY MOUTH EVERY MORNING AND 2 TABLETS EVERY EVENING, Disp: 90 tablet, Rfl: 11  •  PHENobarbital (LUMINAL) 97.2 MG tablet, One tab at hs, Disp: 30 tablet, Rfl: 5  •  polyethylene glycol (MiraLax) 17 GM/SCOOP powder, Take 17 g by mouth Daily. Mix with water 17g daily with 8oz water prn, Disp: 1 each, Rfl: 2  •  Psyllium (Metamucil [employer] Fiber) 55.46 % powder, Take 1 teaspoon(s) by mouth 2 (Two) Times a Day. 1 tsp 1-2 x day as needed, Disp: 1 g, Rfl: 1  •  risperiDONE (risperDAL) 2 MG tablet, TAKE 1 TABLET BY MOUTH TWICE DAILY, Disp: 60 tablet, Rfl: 5  •  cephalexin (KEFLEX) 500 MG capsule, Take 1 capsule by mouth 2 (Two) Times a Day for 7 days., Disp: 20 capsule, Rfl: 0

## 2021-06-03 NOTE — PATIENT INSTRUCTIONS
Take antibiotic as directed with food until gone  Increase fluid intake   Keep appointment for DEXA scan in August  Continue to monitor patient eating and encourage snacks  Follow-up 3 months

## 2021-06-07 LAB
BACTERIA UR CULT: ABNORMAL
BACTERIA UR CULT: ABNORMAL
OTHER ANTIBIOTIC SUSC ISLT: ABNORMAL

## 2021-06-08 ENCOUNTER — OFFICE VISIT (OUTPATIENT)
Dept: PSYCHIATRY | Facility: CLINIC | Age: 61
End: 2021-06-08

## 2021-06-08 DIAGNOSIS — F79 INTELLECTUAL DISABILITY: Chronic | ICD-10-CM

## 2021-06-08 DIAGNOSIS — F20.0 PARANOID SCHIZOPHRENIA (HCC): Primary | Chronic | ICD-10-CM

## 2021-06-08 PROCEDURE — 99214 OFFICE O/P EST MOD 30 MIN: CPT | Performed by: PHYSICIAN ASSISTANT

## 2021-06-08 NOTE — PROGRESS NOTES
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  Dang vaca, PCP, did bloodwork last week, sugar normal, cholesterol mild elevation  Got two COVID vaccines   Enjoys playing [employer]   His  with him today says he is doing well, no issues or concerns.    No AVH  Denies anxiety or depression  No agitation or aggression  No SI/HI  Medications are fine, no need for changes.     The following portions of the patient's history were reviewed and updated as appropriate: allergies, current medications, past family history, past medical history, past social history, past surgical history and problem list.    PAST PSYCHIATRIC HISTORY  Axis I  Schizophrenia/cognitive disorder  Axis II  Learning Disorder    PAST OUTPATIENT TREATMENT  Diagnosis treated:  Cognitive Disorder  Treatment Type:  Medication Management  Prior Psychiatric Medications:  Risperdal  Trileptail  Support Groups:  None  Sequelae Of Mental Disorder:  emotional distress      Interval History  No Change    Side Effects  None    Past Psych History was reviewed and compared to 5/11/20 visit and no updates were needed.      Past Medical History:  Past Medical History:   Diagnosis Date   • Hearing loss    • Hypertension    • Kidney stone    • Mental retardation    • Neurogenic bladder    • Seizure disorder (CMS/HCC)     onset 16-17 years old, last seizure about 2015 when weaning phenobarb       Social History:  Social History     Socioeconomic History   • Marital status: Single     Spouse name: Not on file   • Number of children: Not on file   • Years of education: Not on file   • Highest education level: Not on file   Tobacco Use   • Smoking status: Never Smoker   • Smokeless tobacco: Never Used   Substance and Sexual Activity   • Alcohol use: No   •  Drug use: No   • Sexual activity: Never       Family History:  Family History   Family history unknown: Yes       Past Surgical History:  Past Surgical History:   Procedure Laterality Date   • BLADDER REPAIR     • CATARACT EXTRACTION     • ORIF PATELLA FRACTURE Left    • THORACOTOMY     • TYMPANOMASTOIDECTOMY         Problem List:  Patient Active Problem List   Diagnosis   • Allergy status to unspecified drugs, medicaments and biological substances status   • Anemia   • Constipation   • Deafness   • Difficult or painful urination   • Dysthymia   • Encounter for lipid screening for cardiovascular disease   • Encounter for screening   • Encounter for screening for malignant neoplasm of prostate   • Encounter for immunization   • Therapeutic drug monitoring   • Encounter for immunization   • Encounter for general adult medical examination without abnormal findings   • Enlarged prostate without lower urinary tract symptoms (luts)   • Generalized anxiety disorder   • High cholesterol   • Hypertension, benign   • Hyponatremia   • Knee pain   • Intellectual disability   • Nephrolithiasis   • Otalgia   • Otitis media   • Partial epilepsy with impairment of consciousness, intractable (CMS/HCC)   • Peptic ulcer disease   • Schizophrenia (CMS/HCC)   • Seizure disorder (CMS/HCC)   • Serum creatinine raised   • Weight loss, non-intentional   • Positive hepatitis C antibody test   • BMI 23.0-23.9, adult   • Other osteoporosis without current pathological fracture       Allergy:   Allergies   Allergen Reactions   • Levofloxacin Other (See Comments)   • Sulfamethoxazole-Trimethoprim Other (See Comments)        Discontinued Medications:  There are no discontinued medications.    Current Medications:   Current Outpatient Medications   Medication Sig Dispense Refill   • acetaminophen (TYLENOL) 325 MG tablet Take 2 tablets by mouth Every 4 (Four) Hours As Needed.     • alendronate (FOSAMAX) 70 MG tablet TAKE 1 TABLET BY MOUTH WEEKLY  .TAKE WITH 8OZ WATER BEFORE ANY FOOD OR DRINK. DO NOT L IE DOWN FOR 30 MINUTES 4 tablet 2   • brimonidine-timolol (COMBIGAN) 0.2-0.5 % ophthalmic solution Administer 1 drop to both eyes 2 (Two) Times a Day.     • Calcium Carb-Cholecalciferol 600-400 MG-UNIT tablet TAKE ONE TABLET BY MOUTH TWICE DAILY 60 tablet 0   • calcium carbonate-vitamin d 600-400 MG-UNIT per tablet TAKE ONE TABLET BY MOUTH TWICE DAILY 60 tablet 0   • cephalexin (KEFLEX) 500 MG capsule Take 1 capsule by mouth 2 (Two) Times a Day for 7 days. 20 capsule 0   • chlorhexidine (PERIDEX) 0.12 % solution      • DOCUSIL 100 MG capsule TAKE ONE (1) CAPSULE BY MOUTH TWICE A DAY 60 capsule 3   • famotidine (PEPCID) 20 MG tablet TAKE 1 TABLET BY MOUTH TWICE DAILY 60 tablet 10   • fenofibrate (TRICOR) 48 MG tablet TAKE 1 TABLET BY MOUTH AT BEDTIME 30 tablet 0   • FeroSul 325 (65 Fe) MG tablet TAKE ONE TABLET BY MOUTH TWICE DAILY 60 tablet 0   • fluticasone (FLONASE) 50 MCG/ACT nasal spray 2 sprays into the nostril(s) as directed by provider Daily. 1 bottle 1   • loratadine (CLARITIN) 10 MG tablet TAKE ONE TABLET BY MOUTH EVERY DAY 30 tablet 0   • Mapap 325 MG tablet TAKE 2 TABLETS BY MOUTH EVERY 4 HOURS AS NEEDED FOR FEVER AND PAIN ( MAX 12 DOSES IN 24 HOURS) 60 tablet 11   • Neomycin-Bacitracin-Polymyxin (GNP TRIPLE ANTIBIOTIC) 3.5-400-5000 ointment FOLLOW DIRECTIONS ON LABEL AS NEEDED TO PREVENT INFECTIONS IN MINOR CUTS AND SCRAPES. IF NO IMPROVEMENT IN 48 HOURS NOTIFY NURSE 28.4 g 12   • omeprazole (priLOSEC) 20 MG capsule TAKE 1 CAPSULE BY MOUTH EVERY DAY AS NEEDED ( BUBBLE ) 30 capsule 0   • OXcarbazepine (TRILEPTAL) 600 MG tablet TAKE 1 TABLET BY MOUTH EVERY MORNING AND 2 TABLETS EVERY EVENING 90 tablet 11   • PHENobarbital (LUMINAL) 97.2 MG tablet One tab at hs 30 tablet 5   • polyethylene glycol (MiraLax) 17 GM/SCOOP powder Take 17 g by mouth Daily. Mix with water 17g daily with 8oz water prn 1 each 2   • Psyllium (Metamucil [facility] Fiber) 55.46 %  powder Take 1 teaspoon(s) by mouth 2 (Two) Times a Day. 1 tsp 1-2 x day as needed 1 g 1   • risperiDONE (risperDAL) 2 MG tablet TAKE 1 TABLET BY MOUTH TWICE DAILY 60 tablet 5     No current facility-administered medications for this visit.         Review of Symptoms:    Psychiatric/Behavioral: Negative for agitation, behavioral problems, confusion, decreased concentration, dysphoric mood, hallucinations, self-injury, sleep disturbance and suicidal ideas. The patient is not nervous/anxious and is not hyperactive.        Physical Exam:   There were no vitals taken for this visit.    Mental Status Exam:   Hygiene:   Good  Cooperation:  Cooperative  Eye Contact:  Good  Psychomotor Behavior:  Slow  Affect:  Appropriate  Mood: normal  Hopelessness: Denies  Speech:  Normal  Thought Process:  Goal directed  Thought Content:  Normal  Suicidal:  None  Homicidal:  None  Hallucinations:  None  Delusion:  None  Memory:  Deficits  Orientation:  Person, Place, Time and Situation  Reliability:  fair  Insight:  Fair  Judgement:  Fair  Impulse Control:  Good  Physical/Medical Issues:  No      Mental Status Exam was reviewed and compared to 5/11/20 visit and appropriate updates were made.     PHQ-9 Depression Screening  Little interest or pleasure in doing things? 0   Feeling down, depressed, or hopeless? 0   Trouble falling or staying asleep, or sleeping too much?     Feeling tired or having little energy?     Poor appetite or overeating?     Feeling bad about yourself - or that you are a failure or have let yourself or your family down?     Trouble concentrating on things, such as reading the newspaper or watching television?     Moving or speaking so slowly that other people could have noticed? Or the opposite - being so fidgety or restless that you have been moving around a lot more than usual?     Thoughts that you would be better off dead, or of hurting yourself in some way?     PHQ-9 Total Score 0   If you checked off any  problems, how difficult have these problems made it for you to do your work, take care of things at home, or get along with other people?             Never smoker    I advised Ray of the risks of tobacco use.     Lab Results:   Results Encounter on 06/08/2021   Component Date Value Ref Range Status   • WBC 06/07/2021 6.0  3.4 - 10.8 x10E3/uL Final   • RBC 06/07/2021 3.87* 4.14 - 5.80 x10E6/uL Final   • Hemoglobin 06/07/2021 12.5* 13.0 - 17.7 g/dL Final   • Hematocrit 06/07/2021 37.7  37.5 - 51.0 % Final   • MCV 06/07/2021 97  79 - 97 fL Final   • MCH 06/07/2021 32.3  26.6 - 33.0 pg Final   • MCHC 06/07/2021 33.2  31.5 - 35.7 g/dL Final   • RDW 06/07/2021 12.0  11.6 - 15.4 % Final   • Platelets 06/07/2021 197  150 - 450 x10E3/uL Final   • Neutrophil Rel % 06/07/2021 69  Not Estab. % Final   • Lymphocyte Rel % 06/07/2021 17  Not Estab. % Final   • Monocyte Rel % 06/07/2021 10  Not Estab. % Final   • Eosinophil Rel % 06/07/2021 3  Not Estab. % Final   • Basophil Rel % 06/07/2021 1  Not Estab. % Final   • Neutrophils Absolute 06/07/2021 4.2  1.4 - 7.0 x10E3/uL Final   • Lymphocytes Absolute 06/07/2021 1.0  0.7 - 3.1 x10E3/uL Final   • Monocytes Absolute 06/07/2021 0.6  0.1 - 0.9 x10E3/uL Final   • Eosinophils Absolute 06/07/2021 0.2  0.0 - 0.4 x10E3/uL Final   • Basophils Absolute 06/07/2021 0.1  0.0 - 0.2 x10E3/uL Final   • Immature Granulocyte Rel % 06/07/2021 0  Not Estab. % Final   • Immature Grans Absolute 06/07/2021 0.0  0.0 - 0.1 x10E3/uL Final   • Glucose 06/07/2021 93  65 - 99 mg/dL Final   • BUN 06/07/2021 30* 8 - 27 mg/dL Final   • Creatinine 06/07/2021 1.36* 0.76 - 1.27 mg/dL Final   • eGFR Non  Am 06/07/2021 55* >59 mL/min/1.73 Final   • eGFR African Am 06/07/2021 64  >59 mL/min/1.73 Final    Comment: **[facility] currently reports eGFR in compliance with the current**    recommendations of the [employer] Kid[employer] Foundation. [facility] will    update reporting as new guidelines are published from the  NKF-ASN    Task force.     • BUN/Creatinine Ratio 06/07/2021 22  10 - 24 Final   • Sodium 06/07/2021 143  134 - 144 mmol/L Final   • Potassium 06/07/2021 4.0  3.5 - 5.2 mmol/L Final   • Chloride 06/07/2021 107* 96 - 106 mmol/L Final   • Total CO2 06/07/2021 24  20 - 29 mmol/L Final   • Calcium 06/07/2021 9.6  8.6 - 10.2 mg/dL Final   • Total Protein 06/07/2021 6.9  6.0 - 8.5 g/dL Final   • Albumin 06/07/2021 4.0  3.8 - 4.8 g/dL Final   • Globulin 06/07/2021 2.9  1.5 - 4.5 g/dL Final   • A/G Ratio 06/07/2021 1.4  1.2 - 2.2 Final   • Total Bilirubin 06/07/2021 0.2  0.0 - 1.2 mg/dL Final   • Alkaline Phosphatase 06/07/2021 65  48 - 121 IU/L Final   • AST (SGOT) 06/07/2021 19  0 - 40 IU/L Final   • ALT (SGPT) 06/07/2021 15  0 - 44 IU/L Final   • Total Cholesterol 06/07/2021 236* 100 - 199 mg/dL Final   • Triglycerides 06/07/2021 125  0 - 149 mg/dL Final   • HDL Cholesterol 06/07/2021 64  >39 mg/dL Final   • VLDL Cholesterol Damon 06/07/2021 22  5 - 40 mg/dL Final   • LDL Chol Calc (NIH) 06/07/2021 150* 0 - 99 mg/dL Final   • LDL/HDL RATIO 06/07/2021 2.3  0.0 - 3.6 ratio Final    Comment:                                     LDL/HDL Ratio                                              Men  Women                                1/2 Avg.Risk  1.0    1.5                                    Avg.Risk  3.6    3.2                                 2X Avg.Risk  6.2    5.0                                 3X Avg.Risk  8.0    6.1     Office Visit on 06/03/2021   Component Date Value Ref Range Status   • Color 06/03/2021 Yellow  Yellow, Straw, Dark Yellow, Lluvia Final   • Clarity, UA 06/03/2021 Clear  Clear Final   • Specific Gravity  06/03/2021 1.015  1.005 - 1.030 Final   • pH, Urine 06/03/2021 8.0  5.0 - 8.0 Final   • Leukocytes 06/03/2021 Large (3+)* Negative Final   • Nitrite, UA 06/03/2021 Negative  Negative Final   • Protein, POC 06/03/2021 Trace* Negative mg/dL Final   • Glucose, UA 06/03/2021 Negative  Negative, 1000 mg/dL (3+)  mg/dL Final   • Ketones, UA 06/03/2021 Negative  Negative Final   • Urobilinogen, UA 06/03/2021 Normal  Normal Final   • Bilirubin 06/03/2021 Negative  Negative Final   • Blood, UA 06/03/2021 Small* Negative Final   • Urine Culture 06/04/2021 Final report*  Final   • Result 1 06/04/2021 Serratia marcescens*  Final    Greater than 100,000 colony forming units per mL   • Susceptibility Testing 06/04/2021 Comment   Final    Comment:       ** S = Susceptible; I = Intermediate; R = Resistant **                     P = Positive; N = Negative              MICS are expressed in micrograms per mL     Antibiotic                 RSLT#1    RSLT#2    RSLT#3    RSLT#4  Amoxicillin/Clavulanic Acid    R  Cefazolin                      R  Cefepime                       S  Ceftriaxone                    S  Cefuroxime                     R  Ciprofloxacin                  S  Ertapenem                      S  Gentamicin                     S  Levofloxacin                   S  Meropenem                      S  Nitrofurantoin                 R  Tetracycline                   I  Tobramycin                     S  Trimethoprim/Sulfa             S         Assessment/Plan   Problems Addressed this Visit        Mental Health    Schizophrenia (CMS/[facility]) - Primary (Chronic)       Neuro    Intellectual disability (Chronic)      Diagnoses       Codes Comments    Paranoid schizophrenia (CMS/[facility])    -  Primary ICD-10-CM: F20.0  ICD-9-CM: 295.30     Intellectual disability     ICD-10-CM: F79  ICD-9-CM: 319           Visit Diagnoses:    ICD-10-CM ICD-9-CM   1. Paranoid schizophrenia (CMS/[facility])  F20.0 295.30   2. Intellectual disability  F79 319       TREATMENT PLAN/GOALS: Continue supportive psychotherapy efforts and medications as indicated. Treatment and medication options discussed during today's visit. Patient ackowledged and verbally consented to continue with current treatment plan and was educated on the importance of compliance with treatment and  follow-up appointments.    MEDICATION ISSUES:  INSPECT reviewed as expected  Discussed medication options and treatment plan of prescribed medication as well as the risks, benefits, and side effects including potential falls, possible impaired driving and metabolic adversities among others. Patient is agreeable to call the office with any worsening of symptoms or onset of side effects. Patient is agreeable to call 911 or go to the nearest ER should he/she begin having SI/HI. No medication side effects or related complaints today.     Patient continues to do well on Risperdal, no changes needed, no concerns with his behavior or symptoms.  [facility] pharmacy will contact when refill needed.  Bloodwork done by Dang YARBROUGH ORDERED DURING VISIT:  No orders of the defined types were placed in this encounter.      Return in about 6 months (around 12/8/2021).      This document has been electronically signed by Krista Epstein PA-C  June 8, 2021 15:28 EDT

## 2021-06-17 RX ORDER — CHLORPHENIRAMINE MALEATE 4 MG/1
TABLET ORAL
Qty: 24 TABLET | Refills: 0 | Status: SHIPPED | OUTPATIENT
Start: 2021-06-17 | End: 2022-05-31

## 2021-06-21 RX ORDER — FENOFIBRATE 48 MG/1
TABLET, COATED ORAL
Qty: 30 TABLET | Refills: 0 | Status: SHIPPED | OUTPATIENT
Start: 2021-06-21 | End: 2021-07-16

## 2021-06-21 RX ORDER — FERROUS SULFATE 325(65) MG
TABLET ORAL
Qty: 60 TABLET | Refills: 0 | Status: SHIPPED | OUTPATIENT
Start: 2021-06-21 | End: 2021-07-16

## 2021-06-21 RX ORDER — ALENDRONATE SODIUM 70 MG/1
TABLET ORAL
Qty: 4 TABLET | Refills: 1 | Status: SHIPPED | OUTPATIENT
Start: 2021-06-21 | End: 2021-08-17

## 2021-06-21 RX ORDER — LORATADINE 10 MG/1
TABLET ORAL
Qty: 30 TABLET | Refills: 0 | Status: SHIPPED | OUTPATIENT
Start: 2021-06-21 | End: 2021-07-16

## 2021-07-15 DIAGNOSIS — F20.0 PARANOID SCHIZOPHRENIA (HCC): ICD-10-CM

## 2021-07-15 RX ORDER — RISPERIDONE 2 MG/1
TABLET ORAL
Qty: 60 TABLET | Refills: 5 | Status: SHIPPED | OUTPATIENT
Start: 2021-07-15 | End: 2021-12-07

## 2021-07-16 RX ORDER — LORATADINE 10 MG/1
TABLET ORAL
Qty: 30 TABLET | Refills: 0 | Status: SHIPPED | OUTPATIENT
Start: 2021-07-16 | End: 2021-08-17

## 2021-07-16 RX ORDER — FERROUS SULFATE 325(65) MG
TABLET ORAL
Qty: 60 TABLET | Refills: 0 | Status: SHIPPED | OUTPATIENT
Start: 2021-07-16 | End: 2021-08-17

## 2021-07-16 RX ORDER — FENOFIBRATE 48 MG/1
TABLET, COATED ORAL
Qty: 30 TABLET | Refills: 0 | Status: SHIPPED | OUTPATIENT
Start: 2021-07-16 | End: 2021-08-17

## 2021-08-11 ENCOUNTER — TELEPHONE (OUTPATIENT)
Dept: FAMILY MEDICINE CLINIC | Facility: CLINIC | Age: 61
End: 2021-08-11

## 2021-08-11 NOTE — TELEPHONE ENCOUNTER
Caller: [facility]    HSANDRA CALHOUN     Best call back number: [phone]    What test was performed: DEXA SCAN     When was the test performed: 08/09    Where was the test performed: [facility] IS REQUEST RESULTS BE FAXED TO THEM   [phone]

## 2021-08-12 NOTE — TELEPHONE ENCOUNTER
Called and Marly [facility].  She said it has not been read.  I called and spoke with Debbi @ [employer] and let her know.  SG

## 2021-08-16 DIAGNOSIS — M81.0 OSTEOPOROSIS WITHOUT CURRENT PATHOLOGICAL FRACTURE, UNSPECIFIED OSTEOPOROSIS TYPE: ICD-10-CM

## 2021-08-17 RX ORDER — FERROUS SULFATE 325(65) MG
TABLET ORAL
Qty: 60 TABLET | Refills: 0 | Status: SHIPPED | OUTPATIENT
Start: 2021-08-17 | End: 2021-09-14

## 2021-08-17 RX ORDER — FAMOTIDINE 20 MG/1
TABLET, FILM COATED ORAL
Qty: 60 TABLET | Refills: 9 | Status: SHIPPED | OUTPATIENT
Start: 2021-08-17 | End: 2022-04-25

## 2021-08-17 RX ORDER — ALENDRONATE SODIUM 70 MG/1
TABLET ORAL
Qty: 4 TABLET | Refills: 0 | Status: SHIPPED | OUTPATIENT
Start: 2021-08-17 | End: 2021-09-14

## 2021-08-17 RX ORDER — LORATADINE 10 MG/1
TABLET ORAL
Qty: 30 TABLET | Refills: 0 | Status: SHIPPED | OUTPATIENT
Start: 2021-08-17 | End: 2021-09-14

## 2021-08-17 RX ORDER — FENOFIBRATE 48 MG/1
TABLET, COATED ORAL
Qty: 30 TABLET | Refills: 0 | Status: SHIPPED | OUTPATIENT
Start: 2021-08-17 | End: 2021-09-14

## 2021-09-07 ENCOUNTER — OFFICE VISIT (OUTPATIENT)
Dept: FAMILY MEDICINE CLINIC | Facility: CLINIC | Age: 61
End: 2021-09-07

## 2021-09-07 VITALS
WEIGHT: 154.8 LBS | OXYGEN SATURATION: 98 % | HEART RATE: 71 BPM | HEIGHT: 68 IN | BODY MASS INDEX: 23.46 KG/M2 | SYSTOLIC BLOOD PRESSURE: 120 MMHG | TEMPERATURE: 97.3 F | DIASTOLIC BLOOD PRESSURE: 75 MMHG

## 2021-09-07 DIAGNOSIS — D63.1 ANEMIA DUE TO CHRONIC KIDNEY DISEASE, UNSPECIFIED CKD STAGE: Primary | ICD-10-CM

## 2021-09-07 DIAGNOSIS — R79.89 SERUM CREATININE RAISED: ICD-10-CM

## 2021-09-07 DIAGNOSIS — N18.9 ANEMIA DUE TO CHRONIC KIDNEY DISEASE, UNSPECIFIED CKD STAGE: Primary | ICD-10-CM

## 2021-09-07 DIAGNOSIS — N37 URETHRAL STRICTURE DUE TO INFECTION: ICD-10-CM

## 2021-09-07 PROCEDURE — 99213 OFFICE O/P EST LOW 20 MIN: CPT | Performed by: NURSE PRACTITIONER

## 2021-09-07 NOTE — PROGRESS NOTES
"    Manuel Gaspar is a 61 y.o. male.     61-year-old white male lives in a group home with history hypertension, kidney stones, chronic anemia, neurogenic bladder, hearing loss with hearing aids, chronic hyponatremia, chronic UTIs and seizure disorder who comes in today for 3-month follow-up visit    Blood pressure 120/74 heart rate 70 with murmur he denies any chest pain, dyspnea, tachycardia or dizziness    Patient sees urology and does have a colonized bacteria wit with serratia marcescens.  Patient did not respond to oral antibiotics and urology has been giving him antibiotic injections but caregiver does not know what type of antibiotic was given.  He is also getting ready to have a cystoscopy and have his urethra dilated    Patient's last hemoglobin 12.5 we will be checking CBC today.  His creatinine was 1.36 which is good for him if creatinine  elevates higher again we will probably refer him to nephrology    Weight is 155 with a BMI of 23.6 and patient's weight has remained stable with snacks and health shakes.  Patient will get flu shot in October and will get third Covid shot and December or January and is up-to-date on all other preventative's          Blood work today  Keep all appointments with urology  Please have specialty physicians fax copies to our office  Follow-up 3 months         The following portions of the patient's history were reviewed and updated as appropriate: allergies, current medications, past family history, past medical history, past social history, past surgical history and problem list.    Vitals:    09/07/21 0830   BP: 120/75   BP Location: Right arm   Patient Position: Sitting   Cuff Size: Large Adult   Pulse: 71   Temp: 97.3 °F (36.3 °C)   TempSrc: Temporal   SpO2: 98%   Weight: 70.2 kg (154 lb 12.8 oz)   Height: 172.7 cm (68\")     Body mass index is 23.54 kg/m².    Past Medical History:   Diagnosis Date   • Hearing loss    • Hypertension    • Kidney stone    • Mental retardation  "   • Neurogenic bladder    • Seizure disorder (CMS/HCC)     onset 16-17 years old, last seizure about 2015 when weaning phenobarb     Past Surgical History:   Procedure Laterality Date   • BLADDER REPAIR     • CATARACT EXTRACTION     • ORIF PATELLA FRACTURE Left    • THORACOTOMY     • TYMPANOMASTOIDECTOMY       Family History   Family history unknown: Yes     Immunization History   Administered Date(s) Administered   • COVID-19 (MODERNA) 01/11/2021, 02/08/2021   • Flu Vaccine Intradermal Quad 18-64YR 10/05/2018   • Flu Vaccine Quad PF >18YRS 10/06/2015   • Flu Vaccine Quad PF >36MO 11/23/2016, 11/12/2019   • FluLaval >6 Months 10/21/2020   • Td 06/13/2014       Results Encounter on 06/08/2021   Component Date Value Ref Range Status   • WBC 06/07/2021 6.0  3.4 - 10.8 x10E3/uL Final   • RBC 06/07/2021 3.87* 4.14 - 5.80 x10E6/uL Final   • Hemoglobin 06/07/2021 12.5* 13.0 - 17.7 g/dL Final   • Hematocrit 06/07/2021 37.7  37.5 - 51.0 % Final   • MCV 06/07/2021 97  79 - 97 fL Final   • MCH 06/07/2021 32.3  26.6 - 33.0 pg Final   • MCHC 06/07/2021 33.2  31.5 - 35.7 g/dL Final   • RDW 06/07/2021 12.0  11.6 - 15.4 % Final   • Platelets 06/07/2021 197  150 - 450 x10E3/uL Final   • Neutrophil Rel % 06/07/2021 69  Not Estab. % Final   • Lymphocyte Rel % 06/07/2021 17  Not Estab. % Final   • Monocyte Rel % 06/07/2021 10  Not Estab. % Final   • Eosinophil Rel % 06/07/2021 3  Not Estab. % Final   • Basophil Rel % 06/07/2021 1  Not Estab. % Final   • Neutrophils Absolute 06/07/2021 4.2  1.4 - 7.0 x10E3/uL Final   • Lymphocytes Absolute 06/07/2021 1.0  0.7 - 3.1 x10E3/uL Final   • Monocytes Absolute 06/07/2021 0.6  0.1 - 0.9 x10E3/uL Final   • Eosinophils Absolute 06/07/2021 0.2  0.0 - 0.4 x10E3/uL Final   • Basophils Absolute 06/07/2021 0.1  0.0 - 0.2 x10E3/uL Final   • Immature Granulocyte Rel % 06/07/2021 0  Not Estab. % Final   • Immature Grans Absolute 06/07/2021 0.0  0.0 - 0.1 x10E3/uL Final   • Glucose 06/07/2021 93  65 - 99  mg/dL Final   • BUN 06/07/2021 30* 8 - 27 mg/dL Final   • Creatinine 06/07/2021 1.36* 0.76 - 1.27 mg/dL Final   • eGFR Non  Am 06/07/2021 55* >59 mL/min/1.73 Final   • eGFR African Am 06/07/2021 64  >59 mL/min/1.73 Final    Comment: **[facility] currently reports eGFR in compliance with the current**    recommendations of the [employer]. [facility] will    update reporting as new guidelines are published from the NKF-ASN    Task force.     • BUN/Creatinine Ratio 06/07/2021 22  10 - 24 Final   • Sodium 06/07/2021 143  134 - 144 mmol/L Final   • Potassium 06/07/2021 4.0  3.5 - 5.2 mmol/L Final   • Chloride 06/07/2021 107* 96 - 106 mmol/L Final   • Total CO2 06/07/2021 24  20 - 29 mmol/L Final   • Calcium 06/07/2021 9.6  8.6 - 10.2 mg/dL Final   • Total Protein 06/07/2021 6.9  6.0 - 8.5 g/dL Final   • Albumin 06/07/2021 4.0  3.8 - 4.8 g/dL Final   • Globulin 06/07/2021 2.9  1.5 - 4.5 g/dL Final   • A/G Ratio 06/07/2021 1.4  1.2 - 2.2 Final   • Total Bilirubin 06/07/2021 0.2  0.0 - 1.2 mg/dL Final   • Alkaline Phosphatase 06/07/2021 65  48 - 121 IU/L Final   • AST (SGOT) 06/07/2021 19  0 - 40 IU/L Final   • ALT (SGPT) 06/07/2021 15  0 - 44 IU/L Final   • Total Cholesterol 06/07/2021 236* 100 - 199 mg/dL Final   • Triglycerides 06/07/2021 125  0 - 149 mg/dL Final   • HDL Cholesterol 06/07/2021 64  >39 mg/dL Final   • VLDL Cholesterol Damon 06/07/2021 22  5 - 40 mg/dL Final   • LDL Chol Calc (NIH) 06/07/2021 150* 0 - 99 mg/dL Final   • LDL/HDL RATIO 06/07/2021 2.3  0.0 - 3.6 ratio Final    Comment:                                     LDL/HDL Ratio                                              Men  Women                                1/2 Avg.Risk  1.0    1.5                                    Avg.Risk  3.6    3.2                                 2X Avg.Risk  6.2    5.0                                 3X Avg.Risk  8.0    6.1           Review of Systems    Objective   Physical  Exam    Procedures    Assessment/Plan   Diagnoses and all orders for this visit:    1. Anemia due to chronic kidney disease, unspecified CKD stage (Primary)  -     CBC & Differential    2. Serum creatinine raised  -     Basic Metabolic Panel    3. BMI 23.0-23.9, adult    4. Urethral stricture due to infection          Current Outpatient Medications:   •  acetaminophen (TYLENOL) 325 MG tablet, Take 2 tablets by mouth Every 4 (Four) Hours As Needed., Disp: , Rfl:   •  alendronate (FOSAMAX) 70 MG tablet, TAKE 1 TABLET BY MOUTH WEEKLY .TAKE WITH 8OZ WATER BEFORE ANY FOOD OR DRINK. DO NOT L IE DOWN FOR 30 MINUTES, Disp: 4 tablet, Rfl: 0  •  brimonidine-timolol (COMBIGAN) 0.2-0.5 % ophthalmic solution, Administer 1 drop to both eyes 2 (Two) Times a Day., Disp: , Rfl:   •  Calcium Carb-Cholecalciferol 600-400 MG-UNIT tablet, TAKE ONE TABLET BY MOUTH TWICE DAILY, Disp: 60 tablet, Rfl: 0  •  calcium carbonate-vitamin d 600-400 MG-UNIT per tablet, TAKE ONE TABLET BY MOUTH TWICE DAILY, Disp: 60 tablet, Rfl: 0  •  chlorhexidine (PERIDEX) 0.12 % solution, , Disp: , Rfl:   •  DOCUSIL 100 MG capsule, TAKE ONE (1) CAPSULE BY MOUTH TWICE A DAY, Disp: 60 capsule, Rfl: 3  •  famotidine (PEPCID) 20 MG tablet, TAKE 1 TABLET BY MOUTH TWICE DAILY, Disp: 60 tablet, Rfl: 9  •  fenofibrate (TRICOR) 48 MG tablet, TAKE 1 TABLET BY MOUTH AT BEDTIME, Disp: 30 tablet, Rfl: 0  •  FeroSul 325 (65 Fe) MG tablet, TAKE ONE TABLET BY MOUTH TWICE DAILY, Disp: 60 tablet, Rfl: 0  •  fluticasone (FLONASE) 50 MCG/ACT nasal spray, 2 sprays into the nostril(s) as directed by provider Daily., Disp: 1 bottle, Rfl: 1  •  GNP Allergy 4 MG tablet, TAKE ONE TABLET BY MOUTH 4 TO 6 HOURS AS NEEDED FOR ALLERGIES AND RUNNY NOSE, Disp: 24 tablet, Rfl: 0  •  loratadine (CLARITIN) 10 MG tablet, TAKE ONE TABLET BY MOUTH EVERY DAY, Disp: 30 tablet, Rfl: 0  •  Mapap 325 MG tablet, TAKE 2 TABLETS BY MOUTH EVERY 4 HOURS AS NEEDED FOR FEVER AND PAIN ( MAX 12 DOSES IN 24 HOURS),  Disp: 60 tablet, Rfl: 11  •  Neomycin-Bacitracin-Polymyxin (GNP TRIPLE ANTIBIOTIC) 3.5-400-5000 ointment, FOLLOW DIRECTIONS ON LABEL AS NEEDED TO PREVENT INFECTIONS IN MINOR CUTS AND SCRAPES. IF NO IMPROVEMENT IN 48 HOURS NOTIFY NURSE, Disp: 28.4 g, Rfl: 12  •  omeprazole (priLOSEC) 20 MG capsule, TAKE 1 CAPSULE BY MOUTH EVERY DAY AS NEEDED ( BUBBLE ), Disp: 30 capsule, Rfl: 0  •  OXcarbazepine (TRILEPTAL) 600 MG tablet, TAKE 1 TABLET BY MOUTH EVERY MORNING AND 2 TABLETS EVERY EVENING, Disp: 90 tablet, Rfl: 11  •  PHENobarbital (LUMINAL) 97.2 MG tablet, One tab at hs, Disp: 30 tablet, Rfl: 5  •  polyethylene glycol (MiraLax) 17 GM/SCOOP powder, Take 17 g by mouth Daily. Mix with water 17g daily with 8oz water prn, Disp: 1 each, Rfl: 2  •  Psyllium (Metamucil [facility] Fiber) 55.46 % powder, Take 1 teaspoon(s) by mouth 2 (Two) Times a Day. 1 tsp 1-2 x day as needed, Disp: 1 g, Rfl: 1  •  risperiDONE (risperDAL) 2 MG tablet, TAKE 1 TABLET BY MOUTH TWICE DAILY, Disp: 60 tablet, Rfl: 5

## 2021-09-07 NOTE — PATIENT INSTRUCTIONS
Blood work today  Keep all appointments with urology  Please have specialty physicians fax copies to our office  Follow-up 3 months

## 2021-09-08 LAB
BASOPHILS # BLD AUTO: 0.1 X10E3/UL (ref 0–0.2)
BASOPHILS NFR BLD AUTO: 1 %
BUN SERPL-MCNC: 29 MG/DL (ref 8–27)
BUN/CREAT SERPL: 23 (ref 10–24)
CALCIUM SERPL-MCNC: 9.5 MG/DL (ref 8.6–10.2)
CHLORIDE SERPL-SCNC: 103 MMOL/L (ref 96–106)
CO2 SERPL-SCNC: 23 MMOL/L (ref 20–29)
CREAT SERPL-MCNC: 1.24 MG/DL (ref 0.76–1.27)
EOSINOPHIL # BLD AUTO: 0.2 X10E3/UL (ref 0–0.4)
EOSINOPHIL NFR BLD AUTO: 4 %
ERYTHROCYTE [DISTWIDTH] IN BLOOD BY AUTOMATED COUNT: 12.2 % (ref 11.6–15.4)
GLUCOSE SERPL-MCNC: 94 MG/DL (ref 65–99)
HCT VFR BLD AUTO: 39.1 % (ref 37.5–51)
HGB BLD-MCNC: 13.1 G/DL (ref 13–17.7)
IMM GRANULOCYTES # BLD AUTO: 0 X10E3/UL (ref 0–0.1)
IMM GRANULOCYTES NFR BLD AUTO: 0 %
LYMPHOCYTES # BLD AUTO: 1 X10E3/UL (ref 0.7–3.1)
LYMPHOCYTES NFR BLD AUTO: 21 %
MCH RBC QN AUTO: 32.3 PG (ref 26.6–33)
MCHC RBC AUTO-ENTMCNC: 33.5 G/DL (ref 31.5–35.7)
MCV RBC AUTO: 97 FL (ref 79–97)
MONOCYTES # BLD AUTO: 0.5 X10E3/UL (ref 0.1–0.9)
MONOCYTES NFR BLD AUTO: 10 %
NEUTROPHILS # BLD AUTO: 3 X10E3/UL (ref 1.4–7)
NEUTROPHILS NFR BLD AUTO: 64 %
PLATELET # BLD AUTO: 184 X10E3/UL (ref 150–450)
POTASSIUM SERPL-SCNC: 4.1 MMOL/L (ref 3.5–5.2)
RBC # BLD AUTO: 4.05 X10E6/UL (ref 4.14–5.8)
SODIUM SERPL-SCNC: 139 MMOL/L (ref 134–144)
WBC # BLD AUTO: 4.7 X10E3/UL (ref 3.4–10.8)

## 2021-09-09 ENCOUNTER — CLINICAL SUPPORT (OUTPATIENT)
Dept: FAMILY MEDICINE CLINIC | Facility: CLINIC | Age: 61
End: 2021-09-09

## 2021-09-09 DIAGNOSIS — Z20.822 ENCOUNTER FOR PREPROCEDURE SCREENING LABORATORY TESTING FOR COVID-19: Primary | ICD-10-CM

## 2021-09-09 DIAGNOSIS — Z01.812 ENCOUNTER FOR PREPROCEDURE SCREENING LABORATORY TESTING FOR COVID-19: Primary | ICD-10-CM

## 2021-09-10 LAB
LABCORP SARS-COV-2, NAA 2 DAY TAT: NORMAL
SARS-COV-2 RNA RESP QL NAA+PROBE: NOT DETECTED

## 2021-09-13 DIAGNOSIS — G40.109 SEIZURE, TEMPORAL LOBE (HCC): ICD-10-CM

## 2021-09-14 RX ORDER — FERROUS SULFATE 325(65) MG
TABLET ORAL
Qty: 60 TABLET | Refills: 0 | Status: SHIPPED | OUTPATIENT
Start: 2021-09-14 | End: 2021-10-09

## 2021-09-14 RX ORDER — LORATADINE 10 MG/1
TABLET ORAL
Qty: 30 TABLET | Refills: 0 | Status: SHIPPED | OUTPATIENT
Start: 2021-09-14 | End: 2021-10-09

## 2021-09-14 RX ORDER — FENOFIBRATE 48 MG/1
TABLET, COATED ORAL
Qty: 30 TABLET | Refills: 0 | Status: SHIPPED | OUTPATIENT
Start: 2021-09-14 | End: 2021-10-09

## 2021-09-14 RX ORDER — ALENDRONATE SODIUM 70 MG/1
TABLET ORAL
Qty: 4 TABLET | Refills: 0 | Status: SHIPPED | OUTPATIENT
Start: 2021-09-14 | End: 2021-10-08

## 2021-09-14 RX ORDER — PHENOBARBITAL 97.2 MG/1
TABLET ORAL
Qty: 30 TABLET | Refills: 5 | Status: SHIPPED | OUTPATIENT
Start: 2021-09-14 | End: 2022-02-25

## 2021-10-08 ENCOUNTER — CLINICAL SUPPORT (OUTPATIENT)
Dept: FAMILY MEDICINE CLINIC | Facility: CLINIC | Age: 61
End: 2021-10-08

## 2021-10-08 DIAGNOSIS — Z20.822 EXPOSURE TO COVID-19 VIRUS: Primary | ICD-10-CM

## 2021-10-08 PROCEDURE — 99211 OFF/OP EST MAY X REQ PHY/QHP: CPT | Performed by: NURSE PRACTITIONER

## 2021-10-08 RX ORDER — ALENDRONATE SODIUM 70 MG/1
TABLET ORAL
Qty: 4 TABLET | Refills: 0 | Status: SHIPPED | OUTPATIENT
Start: 2021-10-08 | End: 2021-11-05

## 2021-10-09 LAB
LABCORP SARS-COV-2, NAA 2 DAY TAT: NORMAL
SARS-COV-2 RNA RESP QL NAA+PROBE: NOT DETECTED

## 2021-10-09 RX ORDER — FENOFIBRATE 48 MG/1
TABLET, COATED ORAL
Qty: 30 TABLET | Refills: 0 | Status: SHIPPED | OUTPATIENT
Start: 2021-10-09 | End: 2021-11-05

## 2021-10-09 RX ORDER — LORATADINE 10 MG/1
TABLET ORAL
Qty: 30 TABLET | Refills: 0 | Status: SHIPPED | OUTPATIENT
Start: 2021-10-09 | End: 2021-11-05

## 2021-10-09 RX ORDER — FERROUS SULFATE 325(65) MG
TABLET ORAL
Qty: 60 TABLET | Refills: 0 | Status: SHIPPED | OUTPATIENT
Start: 2021-10-09 | End: 2021-11-05

## 2021-10-11 PROCEDURE — 88307 TISSUE EXAM BY PATHOLOGIST: CPT | Performed by: UROLOGY

## 2021-10-12 ENCOUNTER — LAB REQUISITION (OUTPATIENT)
Dept: LAB | Facility: HOSPITAL | Age: 61
End: 2021-10-12

## 2021-10-12 DIAGNOSIS — N32.0 BLADDER-NECK OBSTRUCTION: ICD-10-CM

## 2021-10-13 LAB
LAB AP CASE REPORT: NORMAL
PATH REPORT.FINAL DX SPEC: NORMAL
PATH REPORT.GROSS SPEC: NORMAL

## 2021-11-05 RX ORDER — FENOFIBRATE 48 MG/1
TABLET, COATED ORAL
Qty: 30 TABLET | Refills: 0 | Status: SHIPPED | OUTPATIENT
Start: 2021-11-05 | End: 2021-12-03

## 2021-11-05 RX ORDER — LORATADINE 10 MG/1
TABLET ORAL
Qty: 30 TABLET | Refills: 0 | Status: SHIPPED | OUTPATIENT
Start: 2021-11-05 | End: 2021-12-03

## 2021-11-05 RX ORDER — FERROUS SULFATE 325(65) MG
TABLET ORAL
Qty: 60 TABLET | Refills: 0 | Status: SHIPPED | OUTPATIENT
Start: 2021-11-05 | End: 2021-12-03

## 2021-11-05 RX ORDER — ALENDRONATE SODIUM 70 MG/1
TABLET ORAL
Qty: 4 TABLET | Refills: 0 | Status: SHIPPED | OUTPATIENT
Start: 2021-11-05 | End: 2021-12-03

## 2021-11-18 ENCOUNTER — TELEPHONE (OUTPATIENT)
Dept: FAMILY MEDICINE CLINIC | Facility: CLINIC | Age: 61
End: 2021-11-18

## 2021-11-18 NOTE — TELEPHONE ENCOUNTER
[facility] PH[facility] RECOMMENDED THAT THE PATIENT IS NEEDING A DEXA SCAN     [facility] SEND AN ORDER FOR THIS     PLEASE CALL IF YOU HAVE ANY QUESTIONS     [phone] - SIMBA READ  [phone]

## 2021-12-03 RX ORDER — LORATADINE 10 MG/1
TABLET ORAL
Qty: 30 TABLET | Refills: 0 | Status: SHIPPED | OUTPATIENT
Start: 2021-12-03 | End: 2021-12-29

## 2021-12-03 RX ORDER — PNV NO.95/FERROUS FUM/FOLIC AC 28MG-0.8MG
TABLET ORAL
Qty: 60 TABLET | Refills: 0 | Status: SHIPPED | OUTPATIENT
Start: 2021-12-03 | End: 2021-12-29

## 2021-12-03 RX ORDER — ALENDRONATE SODIUM 70 MG/1
TABLET ORAL
Qty: 4 TABLET | Refills: 0 | Status: SHIPPED | OUTPATIENT
Start: 2021-12-03 | End: 2021-12-29

## 2021-12-03 RX ORDER — FENOFIBRATE 48 MG/1
TABLET, COATED ORAL
Qty: 30 TABLET | Refills: 0 | Status: SHIPPED | OUTPATIENT
Start: 2021-12-03 | End: 2021-12-29

## 2021-12-07 ENCOUNTER — OFFICE VISIT (OUTPATIENT)
Dept: PSYCHIATRY | Facility: CLINIC | Age: 61
End: 2021-12-07

## 2021-12-07 DIAGNOSIS — F41.1 GENERALIZED ANXIETY DISORDER: Chronic | ICD-10-CM

## 2021-12-07 DIAGNOSIS — F20.0 PARANOID SCHIZOPHRENIA (HCC): ICD-10-CM

## 2021-12-07 DIAGNOSIS — F79 INTELLECTUAL DISABILITY: Primary | Chronic | ICD-10-CM

## 2021-12-07 PROCEDURE — 99214 OFFICE O/P EST MOD 30 MIN: CPT | Performed by: PHYSICIAN ASSISTANT

## 2021-12-07 RX ORDER — RISPERIDONE 1 MG/1
1 TABLET ORAL 2 TIMES DAILY
Qty: 60 TABLET | Refills: 5 | Status: SHIPPED | OUTPATIENT
Start: 2021-12-07 | End: 2022-03-10 | Stop reason: SDUPTHER

## 2021-12-07 NOTE — PROGRESS NOTES
INTRODUCTION -- Acute bronchitis is a common clinical condition characterized by cough, with or without sputum production, which lasts for at least five days. It is typically self-limited, resolving within one to three weeks. Symptoms result from inflammation of the lower respiratory tract and are most frequently due viral infection.    Treatment is focused on patient education and supportive care. Antibiotics are not needed for the great majority of patients with acute bronchitis but are greatly overused for this condition. Reducing antibiotic use for acute bronchitis is a national and international healthcare priority      Most cases of acute bronchitis are due to viruses. Acute bronchitis is one of the most common causes of ANTIBIOTIC ABUSE. Acute bronchitis often cannot be distinguished from a simple upper respiratory infection in the first few days of illness; furthermore, acute bronchitis is often associated with simple upper respiratory tract infection. Acute bronchitis is suggested by the persistence of cough for more than five days. In patients with acute bronchitis, cough generally persists two to three weeks, and airway hyperreactivity (occasional coughing) may last five to six weeks.    The presence of colored green or yellow sputum is a nonspecific finding and is NOT predictive of bacterial infection or response to antibiotics. Viruses more commonly cause a colored sputum.     Fever (over 100.5 degrees) is relatively unusual in acute bronchitis and suggests either influenza or pneumonia and you should be reevaluated as secondary infections can occur.        Subjective   Manuel Gaspar is a 61 y.o.white male with MR who presents today for follow up in the office x 15 minutes    Chief Complaint:  Schizophrenia    History of Present Illness:   He is still working at the work shop 4 days a week.  His  says no issues or concerns and has asked for dosage reduction of Risperdal, has been stable for a long time  [facility] did cystoscopy in the fall   Dang vaca, PCP, sees her tomorrow for check up and bloodwork  Got two COVID vaccines   Enjoys playing [employer]   No AVH  Denies anxiety or depression  No agitation or aggression  No SI/HI  Medications are fine, no need for changes.   Sleeps well., grumpy at times    The following portions of the patient's history were reviewed and updated as appropriate: allergies, current medications, past family history, past medical history, past social history, past surgical history and problem list.    PAST PSYCHIATRIC HISTORY  Axis I  Schizophrenia/cognitive disorder  Axis II  Learning Disorder    PAST OUTPATIENT TREATMENT  Diagnosis treated:  Cognitive Disorder  Treatment Type:  Medication Management  Prior Psychiatric Medications:  Risperdal  Trileptail  Support Groups:  None  Sequelae Of Mental Disorder:  emotional distress      Interval History  No Change    Side Effects  None    Past Psych History was reviewed and compared to 6/8/21 visit and no updates were needed.      Past Medical History:  Past Medical History:   Diagnosis Date   • Hearing loss    • Hypertension    • Kidney stone    • Mental retardation    • Neurogenic bladder    • Seizure disorder (HCC)     onset 16-17 years old, last seizure about 2015 when weaning phenobarb       Social History:  Social History     Socioeconomic History   • Marital status: Single   Tobacco Use   • Smoking status: Never Smoker   • Smokeless tobacco: Never Used   Substance and Sexual Activity   • Alcohol use: No   • Drug use: No   • Sexual activity: Never       Family  History:  Family History   Family history unknown: Yes       Past Surgical History:  Past Surgical History:   Procedure Laterality Date   • BLADDER REPAIR     • CATARACT EXTRACTION     • ORIF PATELLA FRACTURE Left    • THORACOTOMY     • TYMPANOMASTOIDECTOMY         Problem List:  Patient Active Problem List   Diagnosis   • Allergy status to unspecified drugs, medicaments and biological substances status   • Anemia   • Constipation   • Deafness   • Difficult or painful urination   • Dysthymia   • Encounter for lipid screening for cardiovascular disease   • Encounter for screening   • Encounter for screening for malignant neoplasm of prostate   • Encounter for immunization   • Therapeutic drug monitoring   • Encounter for immunization   • Encounter for general adult medical examination without abnormal findings   • Enlarged prostate without lower urinary tract symptoms (luts)   • Generalized anxiety disorder   • High cholesterol   • Hypertension, benign   • Hyponatremia   • Knee pain   • Intellectual disability   • Nephrolithiasis   • Otalgia   • Otitis media   • Partial epilepsy with impairment of consciousness, intractable (HCC)   • Peptic ulcer disease   • Schizophrenia (HCC)   • Seizure disorder (HCC)   • Serum creatinine raised   • Weight loss, non-intentional   • Positive hepatitis C antibody test   • BMI 23.0-23.9, adult   • Other osteoporosis without current pathological fracture   • 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     • alendronate  (FOSAMAX) 70 MG tablet TAKE 1 TABLET BY MOUTH WEEKLY .TAKE WITH 8OZ WATER BEFORE ANY FOOD OR DRINK. DO NOT LIE DOWN FOR 30 MINUTES 4 tablet 0   • brimonidine-timolol (COMBIGAN) 0.2-0.5 % ophthalmic solution Administer 1 drop to both eyes 2 (Two) Times a Day.     • Calcium Carb-Cholecalciferol 600-400 MG-UNIT tablet TAKE ONE TABLET BY MOUTH TWICE DAILY 60 tablet 0   • calcium carbonate-vitamin d 600-400 MG-UNIT per tablet TAKE ONE TABLET BY MOUTH TWICE DAILY 60 tablet 0   • chlorhexidine (PERIDEX) 0.12 % solution      • DOCUSIL 100 MG capsule TAKE ONE (1) CAPSULE BY MOUTH TWICE A DAY 60 capsule 3   • famotidine (PEPCID) 20 MG tablet TAKE 1 TABLET BY MOUTH TWICE DAILY 60 tablet 9   • fenofibrate (TRICOR) 48 MG tablet TAKE 1 TABLET BY MOUTH AT BEDTIME 30 tablet 0   • ferrous sulfate 325 (65 Fe) MG tablet TAKE ONE TABLET BY MOUTH TWICE DAILY 60 tablet 0   • fluticasone (FLONASE) 50 MCG/ACT nasal spray 2 sprays into the nostril(s) as directed by provider Daily. 1 bottle 1   • GNP Allergy 4 MG tablet TAKE ONE TABLET BY MOUTH 4 TO 6 HOURS AS NEEDED FOR ALLERGIES AND RUNNY NOSE 24 tablet 0   • loratadine (CLARITIN) 10 MG tablet TAKE ONE TABLET BY MOUTH EVERY DAY 30 tablet 0   • Mapap 325 MG tablet TAKE 2 TABLETS BY MOUTH EVERY 4 HOURS AS NEEDED FOR FEVER AND PAIN ( MAX 12 DOSES IN 24 HOURS) 60 tablet 11   • Neomycin-Bacitracin-Polymyxin (GNP TRIPLE ANTIBIOTIC) 3.5-400-5000 ointment FOLLOW DIRECTIONS ON LABEL AS NEEDED TO PREVENT INFECTIONS IN MINOR CUTS AND SCRAPES. IF NO IMPROVEMENT IN 48 HOURS NOTIFY NURSE 28.4 g 12   • omeprazole (priLOSEC) 20 MG capsule TAKE 1 CAPSULE BY MOUTH EVERY DAY AS NEEDED ( BUBBLE ) 30 capsule 0   • OXcarbazepine (TRILEPTAL) 600 MG tablet TAKE 1 TABLET BY MOUTH EVERY MORNING AND 2 TABLETS EVERY EVENING 90 tablet 11   • PHENobarbital (LUMINAL) 97.2 MG tablet TAKE 1 TABLET BY MOUTH EVERY NIGHT AT BEDTIME * NARC SHEET* 30 tablet 5   • polyethylene glycol (MiraLax) 17 GM/SCOOP powder Take 17 g by  mouth Daily. Mix with water 17g daily with 8oz water prn 1 each 2   • Psyllium (Metamucil [facility] Fiber) 55.46 % powder Take 1 teaspoon(s) by mouth 2 (Two) Times a Day. 1 tsp 1-2 x day as needed 1 g 1   • risperiDONE (risperDAL) 1 MG tablet Take 1 tablet by mouth 2 (Two) Times a Day. 60 tablet 5     No current facility-administered medications for this visit.         Review of Symptoms:    Psychiatric/Behavioral: Negative for agitation, behavioral problems, confusion, decreased concentration, dysphoric mood, hallucinations, self-injury, sleep disturbance and suicidal ideas. The patient is not nervous/anxious and is not hyperactive.        Physical Exam:   There were no vitals taken for this visit.    Mental Status Exam:   Hygiene:   Good  Cooperation:  Cooperative  Eye Contact:  Good  Psychomotor Behavior:  Slow  Affect:  Appropriate  Mood: normal  Hopelessness: Denies  Speech:  Normal  Thought Process:  Goal directed  Thought Content:  Normal  Suicidal:  None  Homicidal:  None  Hallucinations:  None  Delusion:  None  Memory:  Deficits  Orientation:  Person, Place, Time and Situation  Reliability:  fair  Insight:  Fair  Judgement:  Fair  Impulse Control:  Good  Physical/Medical Issues:  No      Mental Status Exam was reviewed and compared to 6/8/21 visit and appropriate updates were made.     PHQ-9 Depression Screening  Little interest or pleasure in doing things? 0   Feeling down, depressed, or hopeless? 0   Trouble falling or staying asleep, or sleeping too much?     Feeling tired or having little energy?     Poor appetite or overeating?     Feeling bad about yourself - or that you are a failure or have let yourself or your family down?     Trouble concentrating on things, such as reading the newspaper or watching television?     Moving or speaking so slowly that other people could have noticed? Or the opposite - being so fidgety or restless that you have been moving around a lot more than usual?     Thoughts  that you would be better off dead, or of hurting yourself in some way?     PHQ-9 Total Score 0   If you checked off any problems, how difficult have these problems made it for you to do your work, take care of things at home, or get along with other people?             Never smoker    I advised Ray of the risks of tobacco use.     Lab Results:   Lab Requisition on 10/11/2021   Component Date Value Ref Range Status   • Case Report 10/11/2021    Final                    Value:Surgical Pathology Report                         Case: [id number]                                  Authorizing Provider:  French Will MD    Collected:           10/11/2021 12:00 AM          Ordering Location:     [facility]       Received:            10/12/2021 09:34 AM                                 LABORATORY                                                                   Pathologist:           French Jimenez MD                                                            Specimen:    Urinary Bladder, Bladder neck tissue                                                      • Final Diagnosis 10/11/2021    Final                    Value:This result contains rich text formatting which cannot be displayed here.   • Gross Description 10/11/2021    Final                    Value:This result contains rich text formatting which cannot be displayed here.   Clinical Support on 10/08/2021   Component Date Value Ref Range Status   • SARS-CoV-2, FABY 10/08/2021 Not Detected  Not Detected Final    Comment: This nucleic acid amplification test was developed and its performance  characteristics determined by [facility]. Nucleic acid  amplification tests include RT-PCR and TMA. This test has not been  FDA cleared or approved. This test has been authorized by FDA under  an Emergency Use Authorization (EUA). This test is only authorized  for the duration of time the declaration that circumstances exist  justifying the authorization  of the emergency use of in vitro  diagnostic tests for detection of SARS-CoV-2 virus and/or diagnosis  of COVID-19 infection under section 564(b)(1) of the Act, 21 U.S.C.  360bbb-3(b) (1), unless the authorization is terminated or revoked  sooner.  When diagnostic testing is negative, the possibility of a false  negative result should be considered in the context of a patient's  recent exposures and the presence of clinical signs and symptoms  consistent with COVID-19. An individual without symptoms of COVID-19  and who is not shedding SARS-CoV-2 virus wo                           uld expect to have a  negative (not detected) result in this assay.     • [facility] SARS-COV-2, FABY 2 DAY TAT 10/08/2021 Performed   Final   Clinical Support on 09/09/2021   Component Date Value Ref Range Status   • SARS-CoV-2, FABY 09/09/2021 Not Detected  Not Detected Final    Comment: This nucleic acid amplification test was developed and its performance  characteristics determined by [facility]. Nucleic acid  amplification tests include RT-PCR and TMA. This test has not been  FDA cleared or approved. This test has been authorized by FDA under  an Emergency Use Authorization (EUA). This test is only authorized  for the duration of time the declaration that circumstances exist  justifying the authorization of the emergency use of in vitro  diagnostic tests for detection of SARS-CoV-2 virus and/or diagnosis  of COVID-19 infection under section 564(b)(1) of the Act, 21 U.S.C.  360bbb-3(b) (1), unless the authorization is terminated or revoked  sooner.  When diagnostic testing is negative, the possibility of a false  negative result should be considered in the context of a patient's  recent exposures and the presence of clinical signs and symptoms  consistent with COVID-19. An individual without symptoms of COVID-19  and who is not shedding SARS-CoV-2 virus wo                           uld expect to have a  negative (not detected)  result in this assay.     • [facility] SARS-COV-2, FABY 2 DAY TAT 09/09/2021 Performed   Final       Assessment/Plan   Problems Addressed this Visit        Mental Health    Generalized anxiety disorder (Chronic)    Relevant Medications    risperiDONE (risperDAL) 1 MG tablet    Schizophrenia (HCC) (Chronic)    Relevant Medications    risperiDONE (risperDAL) 1 MG tablet       Neuro    Intellectual disability - Primary (Chronic)    Relevant Medications    risperiDONE (risperDAL) 1 MG tablet      Diagnoses       Codes Comments    Intellectual disability    -  Primary ICD-10-CM: F79  ICD-9-CM: 319     Paranoid schizophrenia (HCC)     ICD-10-CM: F20.0  ICD-9-CM: 295.30     Generalized anxiety disorder     ICD-10-CM: F41.1  ICD-9-CM: 300.02           Visit Diagnoses:    ICD-10-CM ICD-9-CM   1. Intellectual disability  F79 319   2. Paranoid schizophrenia (HCC)  F20.0 295.30   3. Generalized anxiety disorder  F41.1 300.02       TREATMENT PLAN/GOALS: Continue supportive psychotherapy efforts and medications as indicated. Treatment and medication options discussed during today's visit. Patient ackowledged and verbally consented to continue with current treatment plan and was educated on the importance of compliance with treatment and follow-up appointments.    MEDICATION ISSUES:  INSPECT reviewed as expected  Discussed medication options and treatment plan of prescribed medication as well as the risks, benefits, and side effects including potential falls, possible impaired driving and metabolic adversities among others. Patient is agreeable to call the office with any worsening of symptoms or onset of side effects. Patient is agreeable to call 911 or go to the nearest ER should he/she begin having SI/HI. No medication side effects or related complaints today.     Patient continues to do well on Risperdal, but  feels he is ready for dosage reduction  Decrease Risperdal from 2 mg BID to 1 mg BID, new Rx sent to [facility].  Bloodwork done by Dang YARBROUGH ORDERED DURING VISIT:  New Medications Ordered This Visit   Medications   • risperiDONE (risperDAL) 1 MG tablet     Sig: Take 1 tablet by mouth 2 (Two) Times a Day.     Dispense:  60 tablet     Refill:  5     * * N O T I C E * * Last quantity doesn't match original quantity       Return in about 4 months (around 4/7/2022).      This document has been electronically signed by Krista Epstein PA-C  December 7, 2021 10:01 EST

## 2021-12-08 ENCOUNTER — OFFICE VISIT (OUTPATIENT)
Dept: FAMILY MEDICINE CLINIC | Facility: CLINIC | Age: 61
End: 2021-12-08

## 2021-12-08 VITALS
HEIGHT: 68 IN | BODY MASS INDEX: 23.31 KG/M2 | TEMPERATURE: 97.5 F | WEIGHT: 153.8 LBS | SYSTOLIC BLOOD PRESSURE: 126 MMHG | HEART RATE: 77 BPM | DIASTOLIC BLOOD PRESSURE: 72 MMHG | OXYGEN SATURATION: 98 %

## 2021-12-08 DIAGNOSIS — R76.8 POSITIVE HEPATITIS C ANTIBODY TEST: ICD-10-CM

## 2021-12-08 DIAGNOSIS — N18.9 ANEMIA DUE TO CHRONIC KIDNEY DISEASE, UNSPECIFIED CKD STAGE: Primary | ICD-10-CM

## 2021-12-08 DIAGNOSIS — D63.1 ANEMIA DUE TO CHRONIC KIDNEY DISEASE, UNSPECIFIED CKD STAGE: Primary | ICD-10-CM

## 2021-12-08 DIAGNOSIS — R79.89 SERUM CREATININE RAISED: ICD-10-CM

## 2021-12-08 PROCEDURE — 99213 OFFICE O/P EST LOW 20 MIN: CPT | Performed by: NURSE PRACTITIONER

## 2021-12-08 RX ORDER — TAMSULOSIN HYDROCHLORIDE 0.4 MG/1
1 CAPSULE ORAL DAILY
COMMUNITY
Start: 2021-12-03

## 2021-12-08 RX ORDER — ALUMINUM HYDROXIDE, MAGNESIUM HYDROXIDE, DIMETHICONE 200; 200; 20 MG/5ML; MG/5ML; MG/5ML
LIQUID ORAL
COMMUNITY
Start: 2021-09-22

## 2021-12-08 NOTE — PROGRESS NOTES
"    Manuel Gaspar is a 61 y.o. male.     61-year-old white male who lives in a group home with history hypertension, kidney stones, chronic anemia, neurogenic bladder, hearing loss with hearing aids, chronic hyponatremia, chronic UTIs and seizure disorder and resolved hepatitis C who comes in today for 3-month follow-up visit    Blood pressure 126/72 heart rate 76 he denies any chest pain, dyspnea, tachycardia or dizziness    Patient had recent visit to psychiatry who reduce his risperidone since patient is doing so well and they have had no issues with the reduction in this medication.    Patient has also finished visit with gastroenterology for his hepatitis C treatment and that has resolved    On last lab work patient's anemia and CKD had returned to normal we will be checking labs today and I will leave him on iron supplement. We will check lipid panel next visit    Weight is 154 which remained stable with a BMI 23.4 we will continue to monitor  Patient has had 3 Covid vaccines influenza and would need to find out when his pneumonia vaccine will be covered           maintain current diet and monitor weight  Fasting labs next visit   find out when pneumonia vaccine will be covered  Follow-up 3 months                 The following portions of the patient's history were reviewed and updated as appropriate: allergies, current medications, past family history, past medical history, past social history, past surgical history and problem list.    Vitals:    12/08/21 0825   BP: 126/72   BP Location: Right arm   Patient Position: Sitting   Cuff Size: Adult   Pulse: 77   Temp: 97.5 °F (36.4 °C)   TempSrc: Temporal   SpO2: 98%   Weight: 69.8 kg (153 lb 12.8 oz)   Height: 172.7 cm (68\")     Body mass index is 23.39 kg/m².    Past Medical History:   Diagnosis Date   • Hearing loss    • Hypertension    • Kidney stone    • Mental retardation    • Neurogenic bladder    • Seizure disorder (HCC)     onset 16-17 years old, last " seizure about 2015 when weaning phenobarb     Past Surgical History:   Procedure Laterality Date   • BLADDER REPAIR     • CATARACT EXTRACTION     • ORIF PATELLA FRACTURE Left    • THORACOTOMY     • TYMPANOMASTOIDECTOMY       Family History   Family history unknown: Yes     Immunization History   Administered Date(s) Administered   • COVID-19 (MODERNA) 1st, 2nd, 3rd Dose Only 01/11/2021, 02/08/2021   • Flu Vaccine Intradermal Quad 18-64YR 10/05/2018   • Flu Vaccine Quad PF >18YRS 10/06/2015   • Flu Vaccine Quad PF >36MO 11/23/2016, 11/12/2019   • FluLaval/Fluarix/Fluzone >6 10/21/2020   • Td 06/13/2014       Lab Requisition on 10/11/2021   Component Date Value Ref Range Status   • Case Report 10/11/2021    Final                    Value:Surgical Pathology Report                         Case: [id number]                                  Authorizing Provider:  French Will MD    Collected:           10/11/2021 12:00 AM          Ordering Location[facility]     [facility]       Received:            10/12/2021 09:34 AM                                 LABORATORY                                                                   Pathologist:           French Jimenez MD                                                            Specimen:    Urinary Bladder, Bladder neck tissue                                                      • Final Diagnosis 10/11/2021    Final                    Value:This result contains rich text formatting which cannot be displayed here.   • Gross Description 10/11/2021    Final                    Value:This result contains rich text formatting which cannot be displayed here.         Review of Systems   Constitutional: Negative.    HENT: Negative.    Respiratory: Negative.    Cardiovascular: Negative.    Gastrointestinal: Negative.    Genitourinary: Negative.    Musculoskeletal: Negative.    Skin: Negative.    Neurological: Negative.    Psychiatric/Behavioral: Negative.        Objective    Physical Exam  Constitutional:       Appearance: Normal appearance.   HENT:      Head: Normocephalic.   Cardiovascular:      Rate and Rhythm: Normal rate and regular rhythm.      Pulses: Normal pulses.      Heart sounds: Normal heart sounds.   Pulmonary:      Effort: Pulmonary effort is normal.      Breath sounds: Normal breath sounds.   Abdominal:      General: Bowel sounds are normal.   Musculoskeletal:         General: Normal range of motion.   Skin:     General: Skin is warm and dry.   Neurological:      General: No focal deficit present.      Mental Status: He is alert and oriented to person, place, and time.   Psychiatric:         Mood and Affect: Mood normal.         Procedures    Assessment/Plan   Diagnoses and all orders for this visit:    1. Anemia due to chronic kidney disease, unspecified CKD stage (Primary)  -     CBC & Differential    2. Serum creatinine raised  -     Basic Metabolic Panel    3. BMI 23.0-23.9, adult    4. Positive hepatitis C antibody test          Current Outpatient Medications:   •  acetaminophen (TYLENOL) 325 MG tablet, Take 2 tablets by mouth Every 4 (Four) Hours As Needed., Disp: , Rfl:   •  alendronate (FOSAMAX) 70 MG tablet, TAKE 1 TABLET BY MOUTH WEEKLY .TAKE WITH 8OZ WATER BEFORE ANY FOOD OR DRINK. DO NOT LIE DOWN FOR 30 MINUTES, Disp: 4 tablet, Rfl: 0  •  brimonidine-timolol (COMBIGAN) 0.2-0.5 % ophthalmic solution, Administer 1 drop to both eyes 2 (Two) Times a Day., Disp: , Rfl:   •  Calcium Carb-Cholecalciferol 600-400 MG-UNIT tablet, TAKE ONE TABLET BY MOUTH TWICE DAILY, Disp: 60 tablet, Rfl: 0  •  calcium carbonate-vitamin d 600-400 MG-UNIT per tablet, TAKE ONE TABLET BY MOUTH TWICE DAILY, Disp: 60 tablet, Rfl: 0  •  chlorhexidine (PERIDEX) 0.12 % solution, , Disp: , Rfl:   •  DOCUSIL 100 MG capsule, TAKE ONE (1) CAPSULE BY MOUTH TWICE A DAY, Disp: 60 capsule, Rfl: 3  •  famotidine (PEPCID) 20 MG tablet, TAKE 1 TABLET BY MOUTH TWICE DAILY, Disp: 60 tablet, Rfl: 9  •   fenofibrate (TRICOR) 48 MG tablet, TAKE 1 TABLET BY MOUTH AT BEDTIME, Disp: 30 tablet, Rfl: 0  •  ferrous sulfate 325 (65 Fe) MG tablet, TAKE ONE TABLET BY MOUTH TWICE DAILY, Disp: 60 tablet, Rfl: 0  •  fluticasone (FLONASE) 50 MCG/ACT nasal spray, 2 sprays into the nostril(s) as directed by provider Daily., Disp: 1 bottle, Rfl: 1  •  GNP Allergy 4 MG tablet, TAKE ONE TABLET BY MOUTH 4 TO 6 HOURS AS NEEDED FOR ALLERGIES AND RUNNY NOSE, Disp: 24 tablet, Rfl: 0  •  GNP Antacid Regular Strength 200-200-20 MG/5ML suspension, , Disp: , Rfl:   •  loratadine (CLARITIN) 10 MG tablet, TAKE ONE TABLET BY MOUTH EVERY DAY, Disp: 30 tablet, Rfl: 0  •  Mapap 325 MG tablet, TAKE 2 TABLETS BY MOUTH EVERY 4 HOURS AS NEEDED FOR FEVER AND PAIN ( MAX 12 DOSES IN 24 HOURS), Disp: 60 tablet, Rfl: 11  •  Neomycin-Bacitracin-Polymyxin (GNP TRIPLE ANTIBIOTIC) 3.5-400-5000 ointment, FOLLOW DIRECTIONS ON LABEL AS NEEDED TO PREVENT INFECTIONS IN MINOR CUTS AND SCRAPES. IF NO IMPROVEMENT IN 48 HOURS NOTIFY NURSE, Disp: 28.4 g, Rfl: 12  •  omeprazole (priLOSEC) 20 MG capsule, TAKE 1 CAPSULE BY MOUTH EVERY DAY AS NEEDED ( BUBBLE ), Disp: 30 capsule, Rfl: 0  •  OXcarbazepine (TRILEPTAL) 600 MG tablet, TAKE 1 TABLET BY MOUTH EVERY MORNING AND 2 TABLETS EVERY EVENING, Disp: 90 tablet, Rfl: 11  •  PHENobarbital (LUMINAL) 97.2 MG tablet, TAKE 1 TABLET BY MOUTH EVERY NIGHT AT BEDTIME * NARC SHEET*, Disp: 30 tablet, Rfl: 5  •  polyethylene glycol (MiraLax) 17 GM/SCOOP powder, Take 17 g by mouth Daily. Mix with water 17g daily with 8oz water prn, Disp: 1 each, Rfl: 2  •  Psyllium (Metamucil [facility] Fiber) 55.46 % powder, Take 1 teaspoon(s) by mouth 2 (Two) Times a Day. 1 tsp 1-2 x day as needed, Disp: 1 g, Rfl: 1  •  risperiDONE (risperDAL) 1 MG tablet, Take 1 tablet by mouth 2 (Two) Times a Day., Disp: 60 tablet, Rfl: 5  •  tamsulosin (FLOMAX) 0.4 MG capsule 24 hr capsule, Take 1 capsule by mouth Daily., Disp: , Rfl:

## 2021-12-08 NOTE — PATIENT INSTRUCTIONS
maintain current diet and monitor weight  Fasting labs next visit   find out when pneumonia vaccine will be covered  Follow-up 3 months

## 2021-12-09 LAB
BASOPHILS # BLD AUTO: 0 X10E3/UL (ref 0–0.2)
BASOPHILS NFR BLD AUTO: 1 %
BUN SERPL-MCNC: 22 MG/DL (ref 8–27)
BUN/CREAT SERPL: 17 (ref 10–24)
CALCIUM SERPL-MCNC: 9.6 MG/DL (ref 8.6–10.2)
CHLORIDE SERPL-SCNC: 100 MMOL/L (ref 96–106)
CO2 SERPL-SCNC: 28 MMOL/L (ref 20–29)
CREAT SERPL-MCNC: 1.28 MG/DL (ref 0.76–1.27)
EOSINOPHIL # BLD AUTO: 0.2 X10E3/UL (ref 0–0.4)
EOSINOPHIL NFR BLD AUTO: 4 %
ERYTHROCYTE [DISTWIDTH] IN BLOOD BY AUTOMATED COUNT: 12.2 % (ref 11.6–15.4)
GLUCOSE SERPL-MCNC: 65 MG/DL (ref 65–99)
HCT VFR BLD AUTO: 37.4 % (ref 37.5–51)
HGB BLD-MCNC: 12.9 G/DL (ref 13–17.7)
IMM GRANULOCYTES # BLD AUTO: 0 X10E3/UL (ref 0–0.1)
IMM GRANULOCYTES NFR BLD AUTO: 0 %
LYMPHOCYTES # BLD AUTO: 1 X10E3/UL (ref 0.7–3.1)
LYMPHOCYTES NFR BLD AUTO: 19 %
MCH RBC QN AUTO: 32.7 PG (ref 26.6–33)
MCHC RBC AUTO-ENTMCNC: 34.5 G/DL (ref 31.5–35.7)
MCV RBC AUTO: 95 FL (ref 79–97)
MONOCYTES # BLD AUTO: 0.6 X10E3/UL (ref 0.1–0.9)
MONOCYTES NFR BLD AUTO: 11 %
NEUTROPHILS # BLD AUTO: 3.4 X10E3/UL (ref 1.4–7)
NEUTROPHILS NFR BLD AUTO: 65 %
PLATELET # BLD AUTO: 190 X10E3/UL (ref 150–450)
POTASSIUM SERPL-SCNC: 4.6 MMOL/L (ref 3.5–5.2)
RBC # BLD AUTO: 3.94 X10E6/UL (ref 4.14–5.8)
SODIUM SERPL-SCNC: 138 MMOL/L (ref 134–144)
WBC # BLD AUTO: 5.1 X10E3/UL (ref 3.4–10.8)

## 2021-12-28 ENCOUNTER — OFFICE VISIT (OUTPATIENT)
Dept: FAMILY MEDICINE CLINIC | Facility: CLINIC | Age: 61
End: 2021-12-28

## 2021-12-28 VITALS
HEIGHT: 68 IN | BODY MASS INDEX: 23.13 KG/M2 | HEART RATE: 61 BPM | OXYGEN SATURATION: 99 % | DIASTOLIC BLOOD PRESSURE: 76 MMHG | WEIGHT: 152.6 LBS | TEMPERATURE: 97.7 F | RESPIRATION RATE: 18 BRPM | SYSTOLIC BLOOD PRESSURE: 122 MMHG

## 2021-12-28 DIAGNOSIS — R35.0 URINARY FREQUENCY: Primary | ICD-10-CM

## 2021-12-28 LAB
BILIRUB BLD-MCNC: NEGATIVE MG/DL
CLARITY, POC: CLEAR
COLOR UR: YELLOW
EXPIRATION DATE: NORMAL
GLUCOSE UR STRIP-MCNC: NEGATIVE MG/DL
KETONES UR QL: NEGATIVE
LEUKOCYTE EST, POC: NEGATIVE
Lab: NORMAL
NITRITE UR-MCNC: NEGATIVE MG/ML
PH UR: 6 [PH] (ref 5–8)
PROT UR STRIP-MCNC: NEGATIVE MG/DL
RBC # UR STRIP: NEGATIVE /UL
SP GR UR: 1.02 (ref 1–1.03)
UROBILINOGEN UR QL: NORMAL

## 2021-12-28 PROCEDURE — 81003 URINALYSIS AUTO W/O SCOPE: CPT | Performed by: NURSE PRACTITIONER

## 2021-12-28 PROCEDURE — 99213 OFFICE O/P EST LOW 20 MIN: CPT | Performed by: NURSE PRACTITIONER

## 2021-12-28 NOTE — ASSESSMENT & PLAN NOTE
Advised to monitor urinary frequency and to return if there are any increases in his urinary frequency or other symptoms pertaining to a possible UTI.

## 2021-12-28 NOTE — PROGRESS NOTES
"Chief Complaint  Urinary Frequency    Subjective          Manuel Gaspar presents to [facility] INTERNAL MEDICINE     Manuel is a 61-year-old male patient who presents today with urinary frequency that is reported by patient's nurse today time supervisor. He lives in a group home and is with an intellectual disability.  He has a history of hypertension, kidney stones, chronic anemia, neurogenic bladder, hearing loss with hearing aids, chronic hyponatremia, chronic UTIs and a seizure disorder.  He recently saw his PCP Dang Holder, December 8 of 2021 3-month follow-up.  He was doing well at that visit.    As noted above, third shift nurse has noticed more voiding at night and was concerned with his UTI history.  However, Cecile nursing supervisor is accompanied patient today and reports that this is a \"new nurse on night shift\". Cecile reports that Manuel sees urology several times a year and has had two urethral procedures in the past.  Currently taking 0.4 mg of Flomax at bedtime.  She notes that when he has UTI he does not complain of symptoms.  Advised supervisor today that patient is without a UTI and to monitor whether or not he is actually voiding when he gets up at night or if he is just getting up at night.      Manuel was very excited to hear that he did not have a UTI and does not have to take medications for.  He is eager to get back to his home to have morning coffee with his friends.           Objective   Vital Signs:   /76 (BP Location: Left arm, Patient Position: Sitting, Cuff Size: Adult)   Pulse 61   Temp 97.7 °F (36.5 °C) (Infrared)   Resp 18   Ht 172.7 cm (68\")   Wt 69.2 kg (152 lb 9.6 oz)   SpO2 99%   BMI 23.20 kg/m²       Physical Exam  Constitutional:       Appearance: He is well-developed.      Comments: Wearing a face mask     HENT:      Head: Normocephalic and atraumatic.   Eyes:      Conjunctiva/sclera: Conjunctivae normal.   Cardiovascular:      Rate and Rhythm: Normal rate " and regular rhythm.   Pulmonary:      Effort: Pulmonary effort is normal.      Breath sounds: Normal breath sounds.   Abdominal:      General: Bowel sounds are normal. There is no distension.      Palpations: Abdomen is soft.      Tenderness: There is no abdominal tenderness. There is no right CVA tenderness, left CVA tenderness or guarding.   Musculoskeletal:         General: Normal range of motion.      Cervical back: Normal range of motion.   Skin:     General: Skin is warm and dry.      Findings: No rash.   Neurological:      Mental Status: He is oriented to person, place, and time.   Psychiatric:         Behavior: Behavior normal.            Result Review :                 Assessment and Plan    Diagnoses and all orders for this visit:    1. Urinary frequency (Primary)  Assessment & Plan:  Advised to monitor urinary frequency and to return if there are any increases in his urinary frequency or other symptoms pertaining to a possible UTI.    Orders:  -     POCT urinalysis dipstick, automated      Follow Up   No follow-ups on file.  Patient was given instructions and counseling regarding his condition or for health maintenance advice. Please see specific information pulled into the AVS if appropriate.

## 2021-12-29 RX ORDER — ALENDRONATE SODIUM 70 MG/1
TABLET ORAL
Qty: 4 TABLET | Refills: 0 | Status: SHIPPED | OUTPATIENT
Start: 2021-12-29 | End: 2022-01-30

## 2021-12-29 RX ORDER — FENOFIBRATE 48 MG/1
TABLET, COATED ORAL
Qty: 30 TABLET | Refills: 0 | Status: SHIPPED | OUTPATIENT
Start: 2021-12-29 | End: 2022-01-30

## 2021-12-29 RX ORDER — LORATADINE 10 MG/1
TABLET ORAL
Qty: 30 TABLET | Refills: 0 | Status: SHIPPED | OUTPATIENT
Start: 2021-12-29 | End: 2022-01-30

## 2021-12-29 RX ORDER — PNV NO.95/FERROUS FUM/FOLIC AC 28MG-0.8MG
TABLET ORAL
Qty: 60 TABLET | Refills: 0 | Status: SHIPPED | OUTPATIENT
Start: 2021-12-29 | End: 2022-01-30

## 2022-01-30 RX ORDER — ALENDRONATE SODIUM 70 MG/1
TABLET ORAL
Qty: 4 TABLET | Refills: 0 | Status: SHIPPED | OUTPATIENT
Start: 2022-01-30 | End: 2022-02-25

## 2022-01-30 RX ORDER — PNV NO.95/FERROUS FUM/FOLIC AC 28MG-0.8MG
TABLET ORAL
Qty: 60 TABLET | Refills: 0 | Status: SHIPPED | OUTPATIENT
Start: 2022-01-30 | End: 2022-02-25

## 2022-01-30 RX ORDER — FENOFIBRATE 48 MG/1
TABLET, COATED ORAL
Qty: 30 TABLET | Refills: 0 | Status: SHIPPED | OUTPATIENT
Start: 2022-01-30 | End: 2022-02-25

## 2022-01-30 RX ORDER — LORATADINE 10 MG/1
TABLET ORAL
Qty: 30 TABLET | Refills: 0 | Status: SHIPPED | OUTPATIENT
Start: 2022-01-30 | End: 2022-02-25

## 2022-02-25 DIAGNOSIS — G40.109 SEIZURE, TEMPORAL LOBE: ICD-10-CM

## 2022-02-25 RX ORDER — PHENOBARBITAL 97.2 MG/1
TABLET ORAL
Qty: 30 TABLET | Refills: 4 | Status: SHIPPED | OUTPATIENT
Start: 2022-02-25 | End: 2022-07-16

## 2022-02-25 RX ORDER — PNV NO.95/FERROUS FUM/FOLIC AC 28MG-0.8MG
TABLET ORAL
Qty: 60 TABLET | Refills: 0 | Status: SHIPPED | OUTPATIENT
Start: 2022-02-25 | End: 2022-03-28

## 2022-02-25 RX ORDER — LORATADINE 10 MG/1
TABLET ORAL
Qty: 30 TABLET | Refills: 0 | Status: SHIPPED | OUTPATIENT
Start: 2022-02-25 | End: 2022-03-28

## 2022-02-25 RX ORDER — FENOFIBRATE 48 MG/1
TABLET, COATED ORAL
Qty: 30 TABLET | Refills: 0 | Status: SHIPPED | OUTPATIENT
Start: 2022-02-25 | End: 2022-03-28

## 2022-02-25 RX ORDER — ALENDRONATE SODIUM 70 MG/1
TABLET ORAL
Qty: 4 TABLET | Refills: 0 | Status: SHIPPED | OUTPATIENT
Start: 2022-02-25 | End: 2022-03-28

## 2022-02-28 RX ORDER — OXCARBAZEPINE 600 MG/1
TABLET, FILM COATED ORAL
Qty: 90 TABLET | Refills: 10 | Status: SHIPPED | OUTPATIENT
Start: 2022-02-28 | End: 2022-12-30

## 2022-03-10 ENCOUNTER — TELEPHONE (OUTPATIENT)
Dept: PSYCHIATRY | Facility: CLINIC | Age: 62
End: 2022-03-10

## 2022-03-10 ENCOUNTER — OFFICE VISIT (OUTPATIENT)
Dept: FAMILY MEDICINE CLINIC | Facility: CLINIC | Age: 62
End: 2022-03-10

## 2022-03-10 VITALS
BODY MASS INDEX: 23.01 KG/M2 | DIASTOLIC BLOOD PRESSURE: 75 MMHG | HEART RATE: 76 BPM | TEMPERATURE: 97.1 F | OXYGEN SATURATION: 97 % | WEIGHT: 151.8 LBS | HEIGHT: 68 IN | SYSTOLIC BLOOD PRESSURE: 119 MMHG

## 2022-03-10 DIAGNOSIS — F41.1 GENERALIZED ANXIETY DISORDER: Chronic | ICD-10-CM

## 2022-03-10 DIAGNOSIS — F20.0 PARANOID SCHIZOPHRENIA: Chronic | ICD-10-CM

## 2022-03-10 DIAGNOSIS — R79.89 SERUM CREATININE RAISED: Primary | ICD-10-CM

## 2022-03-10 DIAGNOSIS — F79 INTELLECTUAL DISABILITY: Chronic | ICD-10-CM

## 2022-03-10 DIAGNOSIS — F20.0 PARANOID SCHIZOPHRENIA: ICD-10-CM

## 2022-03-10 PROCEDURE — G0439 PPPS, SUBSEQ VISIT: HCPCS | Performed by: NURSE PRACTITIONER

## 2022-03-10 PROCEDURE — 1159F MED LIST DOCD IN RCRD: CPT | Performed by: NURSE PRACTITIONER

## 2022-03-10 PROCEDURE — 1170F FXNL STATUS ASSESSED: CPT | Performed by: NURSE PRACTITIONER

## 2022-03-10 RX ORDER — RISPERIDONE 2 MG/1
2 TABLET ORAL 2 TIMES DAILY
Qty: 60 TABLET | Refills: 5 | Status: SHIPPED | OUTPATIENT
Start: 2022-03-10 | End: 2022-08-14

## 2022-03-10 NOTE — TELEPHONE ENCOUNTER
Cecile from [facility] states at the patient's last visit he had a reduction in medication.  The staff has notice that the patient has started to become forgetful and has become weir.  He is being mean to the other residents.  He is scheduled to fly for the first time and is very nervous.  He will be flying with no staff.  There will be another resident with him.  The staff is concerned.  He has an appt scheduled after his upcoming trip.  Cecile can be reached at [phone].     Recommendations?

## 2022-03-10 NOTE — PROGRESS NOTES
"    Manuel Gaspra is a 61 y.o. male.     60-year-old white male who lives in a group home with history, MR, anxiety, schizophrenia, hypertension, kidney stones, chronic anemia, neurogenic bladder, hearing loss, chronic hyponatremia, chronic UTI and seizure disorder who comes in today for  Medicare wellness visit.    Blood pressure 118/74 heart rate 76 there have been no complaints of chest pain, dyspnea, tachycardia or dizziness    Patient had recent visit to psychiatrist to reduce his risperidal by 50%.  There is a noticeable difference in patient's affect attitude and overall personality.  Patient is getting ready to take a trip to [place] and I have concerns with making medication changes at this point.  I am not sure the reduction is a good idea in general let alone might before trip.  Counselor is going to try to contact psychiatry before patient leaves for his trip.    Patient's last creatinine was 1.28 and hemoglobin 12.9 will be checking today.  Weight is down 2 pounds at 252 with a BMI 23.1.  Patient up-to-date on COVID vaccines and influenza.  Up-to-date on eye exam PSA and colonoscopy    BMP today  Contact psychiatry about reduction of Risperdal  Monitor weight  Follow-up 3 months             The following portions of the patient's history were reviewed and updated as appropriate: allergies, current medications, past family history, past medical history, past social history, past surgical history and problem list.    Vitals:    03/10/22 0826   BP: 119/75   BP Location: Right arm   Patient Position: Sitting   Cuff Size: Adult   Pulse: 76   Temp: 97.1 °F (36.2 °C)   TempSrc: Temporal   SpO2: 97%   Weight: 68.9 kg (151 lb 12.8 oz)   Height: 172.7 cm (68\")     Body mass index is 23.08 kg/m².    Past Medical History:   Diagnosis Date   • Hearing loss    • Hypertension    • Kidney stone    • Mental retardation    • Neurogenic bladder    • Seizure disorder (HCC)     onset 16-17 years old, last seizure about 2015 " when weaning phenobarb     Past Surgical History:   Procedure Laterality Date   • BLADDER REPAIR     • CATARACT EXTRACTION     • ORIF PATELLA FRACTURE Left    • THORACOTOMY     • TYMPANOMASTOIDECTOMY       Family History   Family history unknown: Yes     Immunization History   Administered Date(s) Administered   • COVID-19 (MODERNA) 1st, 2nd, 3rd Dose Only 01/11/2021, 02/08/2021, 10/17/2021   • Flu Vaccine Intradermal Quad 18-64YR 10/05/2018, 11/19/2021   • Flu Vaccine Quad PF >18YRS 10/06/2015   • Flu Vaccine Quad PF >36MO 11/23/2016, 11/12/2019   • FluLaval/Fluarix/Fluzone >6 10/21/2020   • Td 06/13/2014       Office Visit on 12/28/2021   Component Date Value Ref Range Status   • Color 12/28/2021 Yellow  Yellow, Straw, Dark Yellow, Lluvia Final   • Clarity, UA 12/28/2021 Clear  Clear Final   • Specific Gravity  12/28/2021 1.020  1.005 - 1.030 Final   • pH, Urine 12/28/2021 6.0  5.0 - 8.0 Final   • Leukocytes 12/28/2021 Negative  Negative Final   • Nitrite, UA 12/28/2021 Negative  Negative Final   • Protein, POC 12/28/2021 Negative  Negative mg/dL Final   • Glucose, UA 12/28/2021 Negative  Negative, 1000 mg/dL (3+) mg/dL Final   • Ketones, UA 12/28/2021 Negative  Negative Final   • Urobilinogen, UA 12/28/2021 Normal  Normal Final   • Bilirubin 12/28/2021 Negative  Negative Final   • Blood, UA 12/28/2021 Negative  Negative Final   • Lot Number 12/28/2021 [id number]   Final   • Expiration Date 12/28/2021 12/31/2022   Final         Review of Systems   Constitutional: Negative.    HENT: Negative.    Cardiovascular: Negative.    Gastrointestinal: Negative.    Musculoskeletal: Negative.    Skin: Negative.    Neurological: Negative.    Psychiatric/Behavioral:        See HPI       Objective   Physical Exam  Constitutional:       Appearance: Normal appearance.   HENT:      Head: Normocephalic.   Cardiovascular:      Rate and Rhythm: Normal rate.      Pulses: Normal pulses.      Heart sounds: Normal heart sounds.   Pulmonary:       Effort: Pulmonary effort is normal.      Breath sounds: Normal breath sounds.   Abdominal:      General: Bowel sounds are normal.   Musculoskeletal:         General: Normal range of motion.   Skin:     General: Skin is warm and dry.   Neurological:      General: No focal deficit present.      Mental Status: He is alert and oriented to person, place, and time.   Psychiatric:         Mood and Affect: Mood normal.         Behavior: Behavior normal.         Procedures    Assessment/Plan   Diagnoses and all orders for this visit:    1. Serum creatinine raised (Primary)  -     Basic Metabolic Panel    2. Intellectual disability    3. BMI 23.0-23.9, adult    4. Generalized anxiety disorder    5. Paranoid schizophrenia (HCC)          Current Outpatient Medications:   •  acetaminophen (TYLENOL) 325 MG tablet, Take 2 tablets by mouth Every 4 (Four) Hours As Needed., Disp: , Rfl:   •  alendronate (FOSAMAX) 70 MG tablet, TAKE 1 TABLET BY MOUTH WEEKLY .TAKE WITH 8OZ WATER BEFORE ANY FOOD OR DRINK. DO NOT LIE DOWN FOR 30 MINUTES, Disp: 4 tablet, Rfl: 0  •  brimonidine-timolol (COMBIGAN) 0.2-0.5 % ophthalmic solution, Administer 1 drop to both eyes 2 (Two) Times a Day., Disp: , Rfl:   •  Calcium Carb-Cholecalciferol 600-400 MG-UNIT tablet, TAKE 1 TABLET BY MOUTH TWICE DAILY, Disp: 60 tablet, Rfl: 0  •  calcium carbonate-vitamin d 600-400 MG-UNIT per tablet, TAKE ONE TABLET BY MOUTH TWICE DAILY, Disp: 60 tablet, Rfl: 0  •  chlorhexidine (PERIDEX) 0.12 % solution, , Disp: , Rfl:   •  DOCUSIL 100 MG capsule, TAKE ONE (1) CAPSULE BY MOUTH TWICE A DAY, Disp: 60 capsule, Rfl: 3  •  famotidine (PEPCID) 20 MG tablet, TAKE 1 TABLET BY MOUTH TWICE DAILY, Disp: 60 tablet, Rfl: 9  •  fenofibrate (TRICOR) 48 MG tablet, TAKE 1 TABLET BY MOUTH AT BEDTIME, Disp: 30 tablet, Rfl: 0  •  ferrous sulfate 325 (65 Fe) MG tablet, TAKE 1 TABLET BY MOUTH TWICE DAILY, Disp: 60 tablet, Rfl: 0  •  fluticasone (FLONASE) 50 MCG/ACT nasal spray, 2 sprays into  the nostril(s) as directed by provider Daily., Disp: 1 bottle, Rfl: 1  •  GNP Allergy 4 MG tablet, TAKE ONE TABLET BY MOUTH 4 TO 6 HOURS AS NEEDED FOR ALLERGIES AND RUNNY NOSE, Disp: 24 tablet, Rfl: 0  •  GNP Antacid Regular Strength 200-200-20 MG/5ML suspension, , Disp: , Rfl:   •  loratadine (CLARITIN) 10 MG tablet, TAKE 1 TABLET BY MOUTH EVERY DAY, Disp: 30 tablet, Rfl: 0  •  Mapap 325 MG tablet, TAKE 2 TABLETS BY MOUTH EVERY 4 HOURS AS NEEDED FOR FEVER AND PAIN ( MAX 12 DOSES IN 24 HOURS), Disp: 60 tablet, Rfl: 11  •  Neomycin-Bacitracin-Polymyxin (GNP TRIPLE ANTIBIOTIC) 3.5-400-5000 ointment, FOLLOW DIRECTIONS ON LABEL AS NEEDED TO PREVENT INFECTIONS IN MINOR CUTS AND SCRAPES. IF NO IMPROVEMENT IN 48 HOURS NOTIFY NURSE, Disp: 28.4 g, Rfl: 12  •  omeprazole (priLOSEC) 20 MG capsule, TAKE 1 CAPSULE BY MOUTH EVERY DAY AS NEEDED ( BUBBLE ), Disp: 30 capsule, Rfl: 0  •  OXcarbazepine (TRILEPTAL) 600 MG tablet, TAKE 1 TABLET BY MOUTH EVERY MORNING AND 2 TABLETS EVERY EVENING, Disp: 90 tablet, Rfl: 10  •  PHENobarbital (LUMINAL) 97.2 MG tablet, TAKE 1 TABLET BY MOUTH EVERY NIGHT AT BEDTIME * NARC SHEET*, Disp: 30 tablet, Rfl: 4  •  polyethylene glycol (MiraLax) 17 GM/SCOOP powder, Take 17 g by mouth Daily. Mix with water 17g daily with 8oz water prn, Disp: 1 each, Rfl: 2  •  Psyllium (Metamucil [facility] Fiber) 55.46 % powder, Take 1 teaspoon(s) by mouth 2 (Two) Times a Day. 1 tsp 1-2 x day as needed, Disp: 1 g, Rfl: 1  •  risperiDONE (risperDAL) 1 MG tablet, Take 1 tablet by mouth 2 (Two) Times a Day., Disp: 60 tablet, Rfl: 5  •  tamsulosin (FLOMAX) 0.4 MG capsule 24 hr capsule, Take 1 capsule by mouth Daily., Disp: , Rfl:            Dang Holder, [id number] EST  This note has been electronically signed

## 2022-03-10 NOTE — TELEPHONE ENCOUNTER
I would advise that we increase the Risperdal back to 2 mg twice daily.  I will send a new prescription to [facility] pharmacy.  Also, if they would like a low dose of Xanax to give him before he gets on the plane, I can send that in, as well, once you talk to Cecile

## 2022-03-11 LAB
BUN SERPL-MCNC: 23 MG/DL (ref 8–27)
BUN/CREAT SERPL: 19 (ref 10–24)
CALCIUM SERPL-MCNC: 9.7 MG/DL (ref 8.6–10.2)
CHLORIDE SERPL-SCNC: 99 MMOL/L (ref 96–106)
CO2 SERPL-SCNC: 23 MMOL/L (ref 20–29)
CREAT SERPL-MCNC: 1.19 MG/DL (ref 0.76–1.27)
EGFR GENE MUT ANL BLD/T: 69 ML/MIN/1.73
GLUCOSE SERPL-MCNC: 87 MG/DL (ref 65–99)
POTASSIUM SERPL-SCNC: 4.1 MMOL/L (ref 3.5–5.2)
SODIUM SERPL-SCNC: 137 MMOL/L (ref 134–144)

## 2022-03-11 NOTE — TELEPHONE ENCOUNTER
Informed Cecile rx sent.  She says pt will be alone on plane with no chaperone for an hour and she is afraid of side effects from Xanax, so Cecile and PCP is not wanting Xanax at this time.

## 2022-03-28 RX ORDER — ALENDRONATE SODIUM 70 MG/1
TABLET ORAL
Qty: 4 TABLET | Refills: 0 | Status: SHIPPED | OUTPATIENT
Start: 2022-03-28 | End: 2022-04-25

## 2022-03-28 RX ORDER — LORATADINE 10 MG/1
TABLET ORAL
Qty: 30 TABLET | Refills: 0 | Status: SHIPPED | OUTPATIENT
Start: 2022-03-28 | End: 2022-04-25

## 2022-03-28 RX ORDER — FENOFIBRATE 48 MG/1
TABLET, COATED ORAL
Qty: 30 TABLET | Refills: 0 | Status: SHIPPED | OUTPATIENT
Start: 2022-03-28 | End: 2022-04-25

## 2022-03-28 RX ORDER — FERROUS SULFATE 325(65) MG
TABLET ORAL
Qty: 60 TABLET | Refills: 0 | Status: SHIPPED | OUTPATIENT
Start: 2022-03-28 | End: 2022-04-25

## 2022-03-29 NOTE — PROGRESS NOTES
Chief Complaint  Seizures    Subjective          Manuel Gaspar presents to [facility] NEUROLOGY for Seizures  History of Present Illness    Patient is here to f/u on seizure’s patient caregiver ( group home manger)  Only one spell in past year    states last seizure was last thursday and lasted about 5 minutes   she states his eye where glossed over,not coherent, and drooling, did not respond to voice.   Vital signs were normal after spell.    she states patient did not have convulsion seizure.   patient is currently taking PHENobarbital 97.2 mg 1 qhs and trileptal 600 mg 1 qam and 2 qhs and reports no side effects.        =====previous ov 3/3/21 dr seipel====  Yearly f/u,     Partial epilepsy with impairment of consciousness, no seizures doing well with Phenobarbital 97.2 mg 1 qd and Oxcarbazepine 600  Mg 1 tab in the am and 2 tabs at hs. Patient had apt with his PCP and she checked his levels but hasn't come back yet. Patient had Covid a couple months ago and did well with recovering. No residual he's always tiered he's here today with his caregiver.        Current Outpatient Medications:   •  acetaminophen (TYLENOL) 325 MG tablet, Take 2 tablets by mouth Every 4 (Four) Hours As Needed., Disp: , Rfl:   •  alendronate (FOSAMAX) 70 MG tablet, TAKE 1 TABLET BY MOUTH WEEKLY .TAKE WITH 8OZ WATER BEFORE ANY FOOD OR DRINK. DO NOT LIE DOWN FOR 30 MINUTES, Disp: 4 tablet, Rfl: 0  •  brimonidine-timolol (COMBIGAN) 0.2-0.5 % ophthalmic solution, Administer 1 drop to both eyes 2 (Two) Times a Day., Disp: , Rfl:   •  calcium carbonate-vitamin d 600-400 MG-UNIT per tablet, TAKE ONE TABLET BY MOUTH TWICE DAILY, Disp: 60 tablet, Rfl: 0  •  chlorhexidine (PERIDEX) 0.12 % solution, , Disp: , Rfl:   •  DOCUSIL 100 MG capsule, TAKE ONE (1) CAPSULE BY MOUTH TWICE A DAY, Disp: 60 capsule, Rfl: 3  •  famotidine (PEPCID) 20 MG tablet, TAKE 1 TABLET BY MOUTH TWICE DAILY, Disp: 60 tablet, Rfl: 9  •  fenofibrate (TRICOR) 48 MG  tablet, TAKE 1 TABLET BY MOUTH AT BEDTIME, Disp: 30 tablet, Rfl: 0  •  FeroSul 325 (65 Fe) MG tablet, TAKE 1 TABLET BY MOUTH TWICE DAILY, Disp: 60 tablet, Rfl: 0  •  fluticasone (FLONASE) 50 MCG/ACT nasal spray, 2 sprays into the nostril(s) as directed by provider Daily., Disp: 1 bottle, Rfl: 1  •  GNP Allergy 4 MG tablet, TAKE ONE TABLET BY MOUTH 4 TO 6 HOURS AS NEEDED FOR ALLERGIES AND RUNNY NOSE, Disp: 24 tablet, Rfl: 0  •  GNP Antacid Regular Strength 200-200-20 MG/5ML suspension, , Disp: , Rfl:   •  loratadine (CLARITIN) 10 MG tablet, TAKE 1 TABLET BY MOUTH EVERY DAY, Disp: 30 tablet, Rfl: 0  •  mupirocin (BACTROBAN) 2 % ointment, APPLY TO RIGHT 5TH TOE TWICE DAILY, Disp: , Rfl:   •  Neomycin-Bacitracin-Polymyxin (GNP TRIPLE ANTIBIOTIC) 3.5-400-5000 ointment, FOLLOW DIRECTIONS ON LABEL AS NEEDED TO PREVENT INFECTIONS IN MINOR CUTS AND SCRAPES. IF NO IMPROVEMENT IN 48 HOURS NOTIFY NURSE, Disp: 28.4 g, Rfl: 12  •  omeprazole (priLOSEC) 20 MG capsule, TAKE 1 CAPSULE BY MOUTH EVERY DAY AS NEEDED ( BUBBLE ), Disp: 30 capsule, Rfl: 0  •  OXcarbazepine (TRILEPTAL) 600 MG tablet, TAKE 1 TABLET BY MOUTH EVERY MORNING AND 2 TABLETS EVERY EVENING, Disp: 90 tablet, Rfl: 10  •  PHENobarbital (LUMINAL) 97.2 MG tablet, TAKE 1 TABLET BY MOUTH EVERY NIGHT AT BEDTIME * NARC SHEET*, Disp: 30 tablet, Rfl: 4  •  polyethylene glycol (MiraLax) 17 GM/SCOOP powder, Take 17 g by mouth Daily. Mix with water 17g daily with 8oz water prn, Disp: 1 each, Rfl: 2  •  Psyllium (Metamucil [facility] Fiber) 55.46 % powder, Take 1 teaspoon(s) by mouth 2 (Two) Times a Day. 1 tsp 1-2 x day as needed, Disp: 1 g, Rfl: 1  •  risperiDONE (risperDAL) 2 MG tablet, Take 1 tablet by mouth 2 (Two) Times a Day., Disp: 60 tablet, Rfl: 5  •  tamsulosin (FLOMAX) 0.4 MG capsule 24 hr capsule, Take 1 capsule by mouth Daily., Disp: , Rfl:     Review of Systems   Respiratory: Negative for apnea.    Neurological: Positive for seizures.   Psychiatric/Behavioral:  "Negative for agitation.   All other systems reviewed and are negative.         Objective:    Vital Signs:   /82   Pulse 72   Temp 97.1 °F (36.2 °C) (Temporal)   Ht 172.7 cm (68\")   Wt 69.9 kg (154 lb)   BMI 23.42 kg/m²     Physical Exam  Vitals reviewed.   Pulmonary:      Effort: Pulmonary effort is normal. No respiratory distress.   Neurological:      General: No focal deficit present.      Mental Status: He is alert.        Result Review :                Neurologic Exam     Mental Status   Level of consciousness: alert        Assessment and Plan    Diagnoses and all orders for this visit:    1. Partial epilepsy with impairment of consciousness, intractable (HCC) (Primary)  -     Phenobarbital Level; Future  -     Oxcarbazepine Level; Future     recent seizure will check drug levels  Continue current dose    Follow Up   Return in about 1 year (around 3/30/2023).  Patient was given instructions and counseling regarding his condition or for health maintenance advice. Please see specific information pulled into the AVS if appropriate.     This document has been electronically signed by Joseph Seipel, MD on March 30, 2022 15:16 EDT      "

## 2022-03-30 ENCOUNTER — OFFICE VISIT (OUTPATIENT)
Dept: NEUROLOGY | Facility: CLINIC | Age: 62
End: 2022-03-30

## 2022-03-30 VITALS
DIASTOLIC BLOOD PRESSURE: 82 MMHG | HEIGHT: 68 IN | WEIGHT: 154 LBS | SYSTOLIC BLOOD PRESSURE: 135 MMHG | BODY MASS INDEX: 23.34 KG/M2 | TEMPERATURE: 97.1 F | HEART RATE: 72 BPM

## 2022-03-30 DIAGNOSIS — G40.219 PARTIAL EPILEPSY WITH IMPAIRMENT OF CONSCIOUSNESS, INTRACTABLE: Primary | ICD-10-CM

## 2022-03-30 PROCEDURE — 99214 OFFICE O/P EST MOD 30 MIN: CPT | Performed by: PSYCHIATRY & NEUROLOGY

## 2022-04-12 ENCOUNTER — OFFICE VISIT (OUTPATIENT)
Dept: PSYCHIATRY | Facility: CLINIC | Age: 62
End: 2022-04-12

## 2022-04-12 DIAGNOSIS — F41.1 GENERALIZED ANXIETY DISORDER: Chronic | ICD-10-CM

## 2022-04-12 DIAGNOSIS — F79 INTELLECTUAL DISABILITY: Primary | Chronic | ICD-10-CM

## 2022-04-12 DIAGNOSIS — F20.0 PARANOID SCHIZOPHRENIA: Chronic | ICD-10-CM

## 2022-04-12 PROCEDURE — 99214 OFFICE O/P EST MOD 30 MIN: CPT | Performed by: PHYSICIAN ASSISTANT

## 2022-04-12 NOTE — PROGRESS NOTES
Subjective   Manuel Gaspar is a 61 y.o.white male with MR who presents today for follow up in the office x 15 minutes    Chief Complaint:  Schizophrenia    History of Present Illness:   He is still working at the work shop 4 days a week.  He flew on an airplane this weekend to [place] and did great.  His  is with him, he did not do well with the reduction in Risperdal, became agitated so he is back to 2 mg tabs and doing much better.    Dang vaca, PCP, did labs in Dec 2021, sugar was normal.   He had a recent seizure, still on Trileptal and Phenobarb   Got two COVID vaccines   Enjoys playing [employer]   No AVH  Denies anxiety or depression  No agitation or aggression since he went back up on Risperdal, some paranoia at lower dosage  No SI/HI  Medications are fine, no need for changes.   Sleeps well, grumpy at times    The following portions of the patient's history were reviewed and updated as appropriate: allergies, current medications, past family history, past medical history, past social history, past surgical history and problem list.    PAST PSYCHIATRIC HISTORY  Axis I  Schizophrenia/cognitive disorder  Axis II  Learning Disorder    PAST OUTPATIENT TREATMENT  Diagnosis treated:  Cognitive Disorder  Treatment Type:  Medication Management  Prior Psychiatric Medications:  Risperdal  Trileptail  Support Groups:  None  Sequelae Of Mental Disorder:  emotional distress      Interval History  No Change    Side Effects  None    Past Psych History was reviewed and compared to 12/7/21 visit and no updates were needed.      Past Medical History:  Past Medical History:   Diagnosis Date   • Hearing loss    • Hypertension    • Kidney stone    • Mental retardation    • Neurogenic bladder    • Seizure disorder (HCC)     onset 16-17 years old, last seizure about 2015 when weaning phenobarb       Social History:  Social History     Socioeconomic History   • Marital status: Single   Tobacco Use   •  Smoking status: Never Smoker   • Smokeless tobacco: Never Used   Vaping Use   • Vaping Use: Never used   Substance and Sexual Activity   • Alcohol use: No   • Drug use: No   • Sexual activity: Never       Family History:  Family History   Family history unknown: Yes       Past Surgical History:  Past Surgical History:   Procedure Laterality Date   • BLADDER REPAIR     • CATARACT EXTRACTION     • ORIF PATELLA FRACTURE Left    • THORACOTOMY     • TYMPANOMASTOIDECTOMY         Problem List:  Patient Active Problem List   Diagnosis   • Allergy status to unspecified drugs, medicaments and biological substances status   • Anemia   • Constipation   • Deafness   • Difficult or painful urination   • Dysthymia   • Encounter for lipid screening for cardiovascular disease   • Encounter for screening   • Encounter for screening for malignant neoplasm of prostate   • Encounter for immunization   • Therapeutic drug monitoring   • Encounter for immunization   • Encounter for general adult medical examination without abnormal findings   • Enlarged prostate without lower urinary tract symptoms (luts)   • Generalized anxiety disorder   • High cholesterol   • Hypertension, benign   • Hyponatremia   • Knee pain   • Intellectual disability   • Nephrolithiasis   • Otalgia   • Otitis media   • Partial epilepsy with impairment of consciousness, intractable (HCC)   • Peptic ulcer disease   • Schizophrenia (HCC)   • Seizure disorder (HCC)   • Serum creatinine raised   • Weight loss, non-intentional   • Positive hepatitis C antibody test   • BMI 23.0-23.9, adult   • Other osteoporosis without current pathological fracture   • Urethral stricture due to infection   • Urinary frequency       Allergy:   Allergies   Allergen Reactions   • Levofloxacin Other (See Comments)   • Sulfamethoxazole-Trimethoprim Other (See Comments)        Discontinued Medications:  There are no discontinued medications.    Current Medications:   Current Outpatient  Medications   Medication Sig Dispense Refill   • acetaminophen (TYLENOL) 325 MG tablet Take 2 tablets by mouth Every 4 (Four) Hours As Needed.     • alendronate (FOSAMAX) 70 MG tablet TAKE 1 TABLET BY MOUTH WEEKLY .TAKE WITH 8OZ WATER BEFORE ANY FOOD OR DRINK. DO NOT LIE DOWN FOR 30 MINUTES 4 tablet 0   • brimonidine-timolol (COMBIGAN) 0.2-0.5 % ophthalmic solution Administer 1 drop to both eyes 2 (Two) Times a Day.     • calcium carbonate-vitamin d 600-400 MG-UNIT per tablet TAKE ONE TABLET BY MOUTH TWICE DAILY 60 tablet 0   • chlorhexidine (PERIDEX) 0.12 % solution      • DOCUSIL 100 MG capsule TAKE ONE (1) CAPSULE BY MOUTH TWICE A DAY 60 capsule 3   • famotidine (PEPCID) 20 MG tablet TAKE 1 TABLET BY MOUTH TWICE DAILY 60 tablet 9   • fenofibrate (TRICOR) 48 MG tablet TAKE 1 TABLET BY MOUTH AT BEDTIME 30 tablet 0   • FeroSul 325 (65 Fe) MG tablet TAKE 1 TABLET BY MOUTH TWICE DAILY 60 tablet 0   • fluticasone (FLONASE) 50 MCG/ACT nasal spray 2 sprays into the nostril(s) as directed by provider Daily. 1 bottle 1   • GNP Allergy 4 MG tablet TAKE ONE TABLET BY MOUTH 4 TO 6 HOURS AS NEEDED FOR ALLERGIES AND RUNNY NOSE 24 tablet 0   • GNP Antacid Regular Strength 200-200-20 MG/5ML suspension      • loratadine (CLARITIN) 10 MG tablet TAKE 1 TABLET BY MOUTH EVERY DAY 30 tablet 0   • mupirocin (BACTROBAN) 2 % ointment APPLY TO RIGHT 5TH TOE TWICE DAILY     • Neomycin-Bacitracin-Polymyxin (GNP TRIPLE ANTIBIOTIC) 3.5-400-5000 ointment FOLLOW DIRECTIONS ON LABEL AS NEEDED TO PREVENT INFECTIONS IN MINOR CUTS AND SCRAPES. IF NO IMPROVEMENT IN 48 HOURS NOTIFY NURSE 28.4 g 12   • omeprazole (priLOSEC) 20 MG capsule TAKE 1 CAPSULE BY MOUTH EVERY DAY AS NEEDED ( BUBBLE ) 30 capsule 0   • OXcarbazepine (TRILEPTAL) 600 MG tablet TAKE 1 TABLET BY MOUTH EVERY MORNING AND 2 TABLETS EVERY EVENING 90 tablet 10   • PHENobarbital (LUMINAL) 97.2 MG tablet TAKE 1 TABLET BY MOUTH EVERY NIGHT AT BEDTIME * NARC SHEET* 30 tablet 4   •  polyethylene glycol (MiraLax) 17 GM/SCOOP powder Take 17 g by mouth Daily. Mix with water 17g daily with 8oz water prn 1 each 2   • Psyllium (Metamucil [facility] Fiber) 55.46 % powder Take 1 teaspoon(s) by mouth 2 (Two) Times a Day. 1 tsp 1-2 x day as needed 1 g 1   • risperiDONE (risperDAL) 2 MG tablet Take 1 tablet by mouth 2 (Two) Times a Day. 60 tablet 5   • tamsulosin (FLOMAX) 0.4 MG capsule 24 hr capsule Take 1 capsule by mouth Daily.       No current facility-administered medications for this visit.         Review of Symptoms:    Psychiatric/Behavioral: Negative for agitation, behavioral problems, confusion, decreased concentration, dysphoric mood, hallucinations, self-injury, sleep disturbance and suicidal ideas. The patient is not nervous/anxious and is not hyperactive.        Physical Exam:   There were no vitals taken for this visit.    Mental Status Exam:   Hygiene:   Good  Cooperation:  Cooperative  Eye Contact:  Good  Psychomotor Behavior:  Slow  Affect:  Appropriate  Mood: normal  Hopelessness: Denies  Speech:  Normal  Thought Process:  Goal directed  Thought Content:  Normal  Suicidal:  None  Homicidal:  None  Hallucinations:  None  Delusion:  None  Memory:  Deficits  Orientation:  Person, Place, Time and Situation  Reliability:  fair  Insight:  Fair  Judgement:  Fair  Impulse Control:  Good  Physical/Medical Issues:  No      Mental Status Exam was reviewed and compared to 12/7/21 visit and appropriate updates were made.     PHQ-9 Depression Screening  Little interest or pleasure in doing things?  0   Feeling down, depressed, or hopeless?  0   Trouble falling or staying asleep, or sleeping too much?     Feeling tired or having little energy?     Poor appetite or overeating?     Feeling bad about yourself - or that you are a failure or have let yourself or your family down?     Trouble concentrating on things, such as reading the newspaper or watching television?     Moving or speaking so slowly  that other people could have noticed? Or the opposite - being so fidgety or restless that you have been moving around a lot more than usual?     Thoughts that you would be better off dead, or of hurting yourself in some way?     PHQ-9 Total Score  0   If you checked off any problems, how difficult have these problems made it for you to do your work, take care of things at home, or get along with other people?             Never smoker    I advised Ray of the risks of tobacco use.     Lab Results:   Office Visit on 03/10/2022   Component Date Value Ref Range Status   • Glucose 03/10/2022 87  65 - 99 mg/dL Final   • BUN 03/10/2022 23  8 - 27 mg/dL Final   • Creatinine 03/10/2022 1.19  0.76 - 1.27 mg/dL Final   • EGFR Result 03/10/2022 69  >59 mL/min/1.73 Final   • BUN/Creatinine Ratio 03/10/2022 19  10 - 24 Final   • Sodium 03/10/2022 137  134 - 144 mmol/L Final   • Potassium 03/10/2022 4.1  3.5 - 5.2 mmol/L Final   • Chloride 03/10/2022 99  96 - 106 mmol/L Final   • Total CO2 03/10/2022 23  20 - 29 mmol/L Final   • Calcium 03/10/2022 9.7  8.6 - 10.2 mg/dL Final       Assessment/Plan   Problems Addressed this Visit        Mental Health    Generalized anxiety disorder (Chronic)    Schizophrenia (HCC) (Chronic)       Neuro    Intellectual disability - Primary (Chronic)      Diagnoses       Codes Comments    Intellectual disability    -  Primary ICD-10-CM: F79  ICD-9-CM: 319     Paranoid schizophrenia (HCC)     ICD-10-CM: F20.0  ICD-9-CM: 295.30     Generalized anxiety disorder     ICD-10-CM: F41.1  ICD-9-CM: 300.02           Visit Diagnoses:    ICD-10-CM ICD-9-CM   1. Intellectual disability  F79 319   2. Paranoid schizophrenia (HCC)  F20.0 295.30   3. Generalized anxiety disorder  F41.1 300.02       TREATMENT PLAN/GOALS: Continue supportive psychotherapy efforts and medications as indicated. Treatment and medication options discussed during today's visit. Patient ackowledged and verbally consented to continue with  current treatment plan and was educated on the importance of compliance with treatment and follow-up appointments.    MEDICATION ISSUES:  INSPECT reviewed as expected  Discussed medication options and treatment plan of prescribed medication as well as the risks, benefits, and side effects including potential falls, possible impaired driving and metabolic adversities among others. Patient is agreeable to call the office with any worsening of symptoms or onset of side effects. Patient is agreeable to call 911 or go to the nearest ER should he/she begin having SI/HI. No medication side effects or related complaints today.     Patient continues to do well on Risperdal, did not do well with dosage reduction and back up to 2 mg tabs.  Continue Risperdal 2 mg BID   Continue Trileptal prescribed by neurology for seizures  Bloodwork done by Dang YARBROUGH ORDERED DURING VISIT:  No orders of the defined types were placed in this encounter.      Return in about 6 months (around 10/12/2022).      This document has been electronically signed by Krista Epstein PA-C  April 12, 2022 13:25 EDT

## 2022-04-25 RX ORDER — ALENDRONATE SODIUM 70 MG/1
TABLET ORAL
Qty: 4 TABLET | Refills: 0 | Status: SHIPPED | OUTPATIENT
Start: 2022-04-25 | End: 2022-05-23

## 2022-04-25 RX ORDER — LORATADINE 10 MG/1
TABLET ORAL
Qty: 30 TABLET | Refills: 0 | Status: SHIPPED | OUTPATIENT
Start: 2022-04-25 | End: 2022-05-23

## 2022-04-25 RX ORDER — FAMOTIDINE 20 MG/1
TABLET, FILM COATED ORAL
Qty: 60 TABLET | Refills: 7 | Status: SHIPPED | OUTPATIENT
Start: 2022-04-25 | End: 2022-12-05

## 2022-04-25 RX ORDER — FENOFIBRATE 48 MG/1
TABLET, COATED ORAL
Qty: 30 TABLET | Refills: 0 | Status: SHIPPED | OUTPATIENT
Start: 2022-04-25 | End: 2022-05-23

## 2022-04-25 RX ORDER — FERROUS SULFATE 325(65) MG
TABLET ORAL
Qty: 60 TABLET | Refills: 0 | Status: SHIPPED | OUTPATIENT
Start: 2022-04-25 | End: 2022-05-23

## 2022-05-09 ENCOUNTER — OFFICE VISIT (OUTPATIENT)
Dept: FAMILY MEDICINE CLINIC | Facility: CLINIC | Age: 62
End: 2022-05-09

## 2022-05-09 VITALS
OXYGEN SATURATION: 98 % | SYSTOLIC BLOOD PRESSURE: 127 MMHG | WEIGHT: 151.8 LBS | BODY MASS INDEX: 23.01 KG/M2 | DIASTOLIC BLOOD PRESSURE: 81 MMHG | HEIGHT: 68 IN | TEMPERATURE: 97.3 F | HEART RATE: 86 BPM

## 2022-05-09 DIAGNOSIS — G40.909 SEIZURE DISORDER: ICD-10-CM

## 2022-05-09 DIAGNOSIS — G40.219 PARTIAL EPILEPSY WITH IMPAIRMENT OF CONSCIOUSNESS, INTRACTABLE: ICD-10-CM

## 2022-05-09 DIAGNOSIS — R41.3 MEMORY LOSS: Primary | ICD-10-CM

## 2022-05-09 DIAGNOSIS — N30.00 ACUTE CYSTITIS WITHOUT HEMATURIA: ICD-10-CM

## 2022-05-09 LAB
BILIRUB BLD-MCNC: NEGATIVE MG/DL
CLARITY, POC: CLEAR
COLOR UR: YELLOW
EXPIRATION DATE: ABNORMAL
GLUCOSE UR STRIP-MCNC: NEGATIVE MG/DL
KETONES UR QL: NEGATIVE
LEUKOCYTE EST, POC: ABNORMAL
Lab: ABNORMAL
NITRITE UR-MCNC: NEGATIVE MG/ML
PH UR: 6 [PH] (ref 5–8)
PROT UR STRIP-MCNC: NEGATIVE MG/DL
RBC # UR STRIP: NEGATIVE /UL
SP GR UR: 1.02 (ref 1–1.03)
UROBILINOGEN UR QL: NORMAL

## 2022-05-09 PROCEDURE — 99214 OFFICE O/P EST MOD 30 MIN: CPT | Performed by: NURSE PRACTITIONER

## 2022-05-09 PROCEDURE — 81003 URINALYSIS AUTO W/O SCOPE: CPT | Performed by: NURSE PRACTITIONER

## 2022-05-09 RX ORDER — CEPHALEXIN 500 MG/1
500 CAPSULE ORAL 2 TIMES DAILY
Qty: 20 CAPSULE | Refills: 0 | Status: SHIPPED | OUTPATIENT
Start: 2022-05-09 | End: 2022-06-09

## 2022-05-09 NOTE — PROGRESS NOTES
"    Manuel Gaspar is a 61 y.o. male.     61-year-old white male who lives in a group home with history of MR, anxiety, schizophrenia, hypertension, kidney stone, chronic anemia, neurogenic bladder, hearing loss, chronic hyponatremia, chronic UTI and seizure disorder who comes in with caregiver today.  Patient recently went on a weeklong trip and about a week ago.  She states he did have a small seizure before the trip.  Patient has begun having memory problems at the facility in the past week.  It has been noticed on every shift patient is forgetting things.  Urinalysis did show leukocytes we will treat with antibiotics and will also draw blood work today.  If memory issues does not improve after antibiotics and lab work is within normal limits will recommend psychiatry or neuro follow-up.  Possible CT head    Vital signs are stable            Urinalysis  Blood work today  Possible CT head  Possible psychiatry referral  Keflex 500 mg twice daily x10 days         The following portions of the patient's history were reviewed and updated as appropriate: allergies, current medications, past family history, past medical history, past social history, past surgical history and problem list.    Vitals:    05/09/22 1047   BP: 127/81   BP Location: Right arm   Patient Position: Sitting   Cuff Size: Adult   Pulse: 86   Temp: 97.3 °F (36.3 °C)   TempSrc: Infrared   SpO2: 98%   Weight: 68.9 kg (151 lb 12.8 oz)   Height: 172.7 cm (67.99\")     Body mass index is 23.09 kg/m².    Past Medical History:   Diagnosis Date   • Hearing loss    • Hypertension    • Kidney stone    • Mental retardation    • Neurogenic bladder    • Seizure disorder (HCC)     onset 16-17 years old, last seizure about 2015 when weaning phenobarb     Past Surgical History:   Procedure Laterality Date   • BLADDER REPAIR     • CATARACT EXTRACTION     • ORIF PATELLA FRACTURE Left    • THORACOTOMY     • TYMPANOMASTOIDECTOMY       Family History   Family history unknown: " Yes     Immunization History   Administered Date(s) Administered   • COVID-19 (MODERNA) 1st, 2nd, 3rd Dose Only 01/11/2021, 02/08/2021, 10/17/2021   • Flu Vaccine Intradermal Quad 18-64YR 10/05/2018, 11/19/2021   • Flu Vaccine Quad PF >36MO 11/23/2016, 11/12/2019   • FluLaval/Fluarix/Fluzone >6 10/21/2020   • Fluzone Split Quad (Multi-dose) 10/06/2015   • Td 06/13/2014       Office Visit on 03/10/2022   Component Date Value Ref Range Status   • Glucose 03/10/2022 87  65 - 99 mg/dL Final   • BUN 03/10/2022 23  8 - 27 mg/dL Final   • Creatinine 03/10/2022 1.19  0.76 - 1.27 mg/dL Final   • EGFR Result 03/10/2022 69  >59 mL/min/1.73 Final   • BUN/Creatinine Ratio 03/10/2022 19  10 - 24 Final   • Sodium 03/10/2022 137  134 - 144 mmol/L Final   • Potassium 03/10/2022 4.1  3.5 - 5.2 mmol/L Final   • Chloride 03/10/2022 99  96 - 106 mmol/L Final   • Total CO2 03/10/2022 23  20 - 29 mmol/L Final   • Calcium 03/10/2022 9.7  8.6 - 10.2 mg/dL Final         Review of Systems   Constitutional: Negative.    HENT: Negative.    Respiratory: Negative.    Cardiovascular: Negative.    Gastrointestinal: Negative.    Genitourinary: Negative.    Musculoskeletal: Negative.    Skin: Negative.    Neurological: Positive for memory problem.   Psychiatric/Behavioral: Negative.        Objective   Physical Exam  Constitutional:       Appearance: Normal appearance.   HENT:      Head: Normocephalic.   Cardiovascular:      Rate and Rhythm: Normal rate and regular rhythm.      Pulses: Normal pulses.      Heart sounds: Normal heart sounds.   Pulmonary:      Effort: Pulmonary effort is normal.      Breath sounds: Normal breath sounds.   Abdominal:      General: Bowel sounds are normal.   Musculoskeletal:         General: Normal range of motion.   Skin:     General: Skin is warm and dry.   Neurological:      General: No focal deficit present.      Mental Status: He is alert.   Psychiatric:         Mood and Affect: Mood normal.         Behavior: Behavior  normal.         Procedures    Assessment/Plan   Diagnoses and all orders for this visit:    1. Memory loss (Primary)  -     Comprehensive Metabolic Panel  -     CBC & Differential  -     POC Urinalysis Dipstick, Automated  -     Urine Culture - Urine, Urine, Clean Catch    2. Partial epilepsy with impairment of consciousness, intractable (HCC)  -     Phenobarbital Level  -     Cancel: Oxcarbazepine Level    3. Seizure disorder (HCC)  -     Theophylline level; Future  -     Oxcarbazepine Level; Future    4. Acute cystitis without hematuria    Other orders  -     cephalexin (Keflex) 500 MG capsule; Take 1 capsule by mouth 2 (Two) Times a Day.  Dispense: 20 capsule; Refill: 0          Current Outpatient Medications:   •  acetaminophen (TYLENOL) 325 MG tablet, Take 2 tablets by mouth Every 4 (Four) Hours As Needed., Disp: , Rfl:   •  alendronate (FOSAMAX) 70 MG tablet, TAKE 1 TABLET BY MOUTH WEEKLY .TAKE WITH 8OZ WATER BEFORE ANY FOOD OR DRINK. DO NOT LIE DOWN FOR 30 MINUTES, Disp: 4 tablet, Rfl: 0  •  brimonidine-timolol (COMBIGAN) 0.2-0.5 % ophthalmic solution, Administer 1 drop to both eyes 2 (Two) Times a Day., Disp: , Rfl:   •  Calcium Carb-Cholecalciferol 600-400 MG-UNIT tablet, TAKE 1 TABLET BY MOUTH TWICE DAILY, Disp: 60 tablet, Rfl: 0  •  calcium carbonate-vitamin d 600-400 MG-UNIT per tablet, TAKE ONE TABLET BY MOUTH TWICE DAILY, Disp: 60 tablet, Rfl: 0  •  chlorhexidine (PERIDEX) 0.12 % solution, , Disp: , Rfl:   •  DOCUSIL 100 MG capsule, TAKE ONE (1) CAPSULE BY MOUTH TWICE A DAY, Disp: 60 capsule, Rfl: 3  •  famotidine (PEPCID) 20 MG tablet, TAKE 1 TABLET BY MOUTH TWICE DAILY, Disp: 60 tablet, Rfl: 7  •  fenofibrate (TRICOR) 48 MG tablet, TAKE 1 TABLET BY MOUTH AT BEDTIME, Disp: 30 tablet, Rfl: 0  •  FeroSul 325 (65 Fe) MG tablet, TAKE 1 TABLET BY MOUTH TWICE DAILY, Disp: 60 tablet, Rfl: 0  •  fluticasone (FLONASE) 50 MCG/ACT nasal spray, 2 sprays into the nostril(s) as directed by provider Daily., Disp: 1  bottle, Rfl: 1  •  GNP Allergy 4 MG tablet, TAKE ONE TABLET BY MOUTH 4 TO 6 HOURS AS NEEDED FOR ALLERGIES AND RUNNY NOSE, Disp: 24 tablet, Rfl: 0  •  GNP Antacid Regular Strength 200-200-20 MG/5ML suspension, , Disp: , Rfl:   •  loratadine (CLARITIN) 10 MG tablet, TAKE 1 TABLET BY MOUTH EVERY DAY, Disp: 30 tablet, Rfl: 0  •  mupirocin (BACTROBAN) 2 % ointment, APPLY TO RIGHT 5TH TOE TWICE DAILY, Disp: , Rfl:   •  Neomycin-Bacitracin-Polymyxin (GNP TRIPLE ANTIBIOTIC) 3.5-400-5000 ointment, FOLLOW DIRECTIONS ON LABEL AS NEEDED TO PREVENT INFECTIONS IN MINOR CUTS AND SCRAPES. IF NO IMPROVEMENT IN 48 HOURS NOTIFY NURSE, Disp: 28.4 g, Rfl: 12  •  omeprazole (priLOSEC) 20 MG capsule, TAKE 1 CAPSULE BY MOUTH EVERY DAY AS NEEDED ( BUBBLE ), Disp: 30 capsule, Rfl: 0  •  OXcarbazepine (TRILEPTAL) 600 MG tablet, TAKE 1 TABLET BY MOUTH EVERY MORNING AND 2 TABLETS EVERY EVENING, Disp: 90 tablet, Rfl: 10  •  PHENobarbital (LUMINAL) 97.2 MG tablet, TAKE 1 TABLET BY MOUTH EVERY NIGHT AT BEDTIME * NARC SHEET*, Disp: 30 tablet, Rfl: 4  •  polyethylene glycol (MiraLax) 17 GM/SCOOP powder, Take 17 g by mouth Daily. Mix with water 17g daily with 8oz water prn, Disp: 1 each, Rfl: 2  •  Psyllium (Metamucil [facility] Fiber) 55.46 % powder, Take 1 teaspoon(s) by mouth 2 (Two) Times a Day. 1 tsp 1-2 x day as needed, Disp: 1 g, Rfl: 1  •  risperiDONE (risperDAL) 2 MG tablet, Take 1 tablet by mouth 2 (Two) Times a Day., Disp: 60 tablet, Rfl: 5  •  tamsulosin (FLOMAX) 0.4 MG capsule 24 hr capsule, Take 1 capsule by mouth Daily., Disp: , Rfl:            Dang Holder, APRN 5/9/2022 11:54 EDT  This note has been electronically signed

## 2022-05-09 NOTE — PATIENT INSTRUCTIONS
Urinalysis  Blood work today  Possible CT head  Possible psychiatry referral  Keflex 500 mg twice daily x10 days

## 2022-05-10 LAB
ALBUMIN SERPL-MCNC: 4.4 G/DL (ref 3.8–4.8)
ALBUMIN/GLOB SERPL: 1.8 {RATIO} (ref 1.2–2.2)
ALP SERPL-CCNC: 72 IU/L (ref 44–121)
ALT SERPL-CCNC: 17 IU/L (ref 0–44)
AST SERPL-CCNC: 17 IU/L (ref 0–40)
BASOPHILS # BLD AUTO: 0.1 X10E3/UL (ref 0–0.2)
BASOPHILS NFR BLD AUTO: 1 %
BILIRUB SERPL-MCNC: 0.2 MG/DL (ref 0–1.2)
BUN SERPL-MCNC: 25 MG/DL (ref 8–27)
BUN/CREAT SERPL: 21 (ref 10–24)
CALCIUM SERPL-MCNC: 9.8 MG/DL (ref 8.6–10.2)
CHLORIDE SERPL-SCNC: 97 MMOL/L (ref 96–106)
CO2 SERPL-SCNC: 23 MMOL/L (ref 20–29)
CREAT SERPL-MCNC: 1.18 MG/DL (ref 0.76–1.27)
EGFRCR SERPLBLD CKD-EPI 2021: 69 ML/MIN/1.73
EOSINOPHIL # BLD AUTO: 0.1 X10E3/UL (ref 0–0.4)
EOSINOPHIL NFR BLD AUTO: 2 %
ERYTHROCYTE [DISTWIDTH] IN BLOOD BY AUTOMATED COUNT: 11.8 % (ref 11.6–15.4)
GLOBULIN SER CALC-MCNC: 2.5 G/DL (ref 1.5–4.5)
GLUCOSE SERPL-MCNC: 94 MG/DL (ref 65–99)
HCT VFR BLD AUTO: 38.2 % (ref 37.5–51)
HGB BLD-MCNC: 13.3 G/DL (ref 13–17.7)
IMM GRANULOCYTES # BLD AUTO: 0 X10E3/UL (ref 0–0.1)
IMM GRANULOCYTES NFR BLD AUTO: 0 %
LYMPHOCYTES # BLD AUTO: 1 X10E3/UL (ref 0.7–3.1)
LYMPHOCYTES NFR BLD AUTO: 21 %
MCH RBC QN AUTO: 33 PG (ref 26.6–33)
MCHC RBC AUTO-ENTMCNC: 34.8 G/DL (ref 31.5–35.7)
MCV RBC AUTO: 95 FL (ref 79–97)
MONOCYTES # BLD AUTO: 0.5 X10E3/UL (ref 0.1–0.9)
MONOCYTES NFR BLD AUTO: 11 %
NEUTROPHILS # BLD AUTO: 3 X10E3/UL (ref 1.4–7)
NEUTROPHILS NFR BLD AUTO: 65 %
PLATELET # BLD AUTO: 189 X10E3/UL (ref 150–450)
POTASSIUM SERPL-SCNC: 4.4 MMOL/L (ref 3.5–5.2)
PROT SERPL-MCNC: 6.9 G/DL (ref 6–8.5)
RBC # BLD AUTO: 4.03 X10E6/UL (ref 4.14–5.8)
SODIUM SERPL-SCNC: 134 MMOL/L (ref 134–144)
WBC # BLD AUTO: 4.7 X10E3/UL (ref 3.4–10.8)

## 2022-05-11 LAB
BACTERIA UR CULT: NORMAL
BACTERIA UR CULT: NORMAL

## 2022-05-23 RX ORDER — ALENDRONATE SODIUM 70 MG/1
TABLET ORAL
Qty: 4 TABLET | Refills: 0 | Status: SHIPPED | OUTPATIENT
Start: 2022-05-23 | End: 2022-06-20

## 2022-05-23 RX ORDER — LORATADINE 10 MG/1
TABLET ORAL
Qty: 30 TABLET | Refills: 0 | Status: SHIPPED | OUTPATIENT
Start: 2022-05-23 | End: 2022-06-20

## 2022-05-23 RX ORDER — FENOFIBRATE 48 MG/1
TABLET, COATED ORAL
Qty: 30 TABLET | Refills: 0 | Status: SHIPPED | OUTPATIENT
Start: 2022-05-23 | End: 2022-06-20

## 2022-05-23 RX ORDER — FERROUS SULFATE 325(65) MG
TABLET ORAL
Qty: 60 TABLET | Refills: 0 | Status: SHIPPED | OUTPATIENT
Start: 2022-05-23 | End: 2022-06-20

## 2022-05-31 RX ORDER — CHLORPHENIRAMINE MALEATE 4 MG/1
TABLET ORAL
Qty: 24 TABLET | Refills: 0 | Status: SHIPPED | OUTPATIENT
Start: 2022-05-31

## 2022-06-09 ENCOUNTER — OFFICE VISIT (OUTPATIENT)
Dept: FAMILY MEDICINE CLINIC | Facility: CLINIC | Age: 62
End: 2022-06-09

## 2022-06-09 VITALS
OXYGEN SATURATION: 98 % | HEIGHT: 68 IN | DIASTOLIC BLOOD PRESSURE: 84 MMHG | WEIGHT: 154.6 LBS | SYSTOLIC BLOOD PRESSURE: 130 MMHG | BODY MASS INDEX: 23.43 KG/M2 | HEART RATE: 75 BPM | TEMPERATURE: 97.6 F

## 2022-06-09 DIAGNOSIS — N18.9 ANEMIA DUE TO CHRONIC KIDNEY DISEASE, UNSPECIFIED CKD STAGE: ICD-10-CM

## 2022-06-09 DIAGNOSIS — G40.909 SEIZURE DISORDER: ICD-10-CM

## 2022-06-09 DIAGNOSIS — D63.1 ANEMIA DUE TO CHRONIC KIDNEY DISEASE, UNSPECIFIED CKD STAGE: ICD-10-CM

## 2022-06-09 DIAGNOSIS — E78.00 HIGH CHOLESTEROL: Primary | ICD-10-CM

## 2022-06-09 DIAGNOSIS — E87.1 HYPONATREMIA: ICD-10-CM

## 2022-06-09 PROCEDURE — 99213 OFFICE O/P EST LOW 20 MIN: CPT | Performed by: NURSE PRACTITIONER

## 2022-06-09 RX ORDER — SULFACETAMIDE SODIUM AND PREDNISOLONE SODIUM PHOSPHATE 100; 2.3 MG/ML; MG/ML
SOLUTION/ DROPS OPHTHALMIC
COMMUNITY
Start: 2022-05-10 | End: 2022-06-09

## 2022-06-09 RX ORDER — MAGNESIUM HYDROXIDE 1200 MG/15ML
SUSPENSION ORAL
COMMUNITY
Start: 2022-04-29

## 2022-06-09 RX ORDER — LOPERAMIDE HYDROCHLORIDE 2 MG/1
CAPSULE ORAL
COMMUNITY
Start: 2022-04-29

## 2022-06-09 NOTE — PROGRESS NOTES
"    Manuel Gaspar is a 62 y.o. male.     62-year-old white male who lives in a group home with history of MR, anxiety, schizophrenia, osteoporosis, hypertension, kidney stones, chronic anemia, neurogenic bladder, hearing loss, chronic hyponatremia, chronic UTIs and seizure disorder who comes in today for annual visit    Blood pressure 130/84 heart rate 74 he denies any chest pain, dyspnea, tachycardia or dizziness    Patient did have a small neurological event where he was staring in space and drooling for a few months.  He did see neurology after that but neurologist were not concerned.  However neurology has not checked his phenobarb levels we will do so today.  He has had no further episodes    Patient still sees psychiatry and there have been no major changes there and patient's behavior has been okay    Blood work normal stable with exception of lipid panel will be doing annual labs    Weight is up 4 pounds at 154 BMI 23.5.  He has had 3 COVID vaccines had recent eye exam, DEXA scan is due in August 2023 and his next colonoscopy is due in 2024.  Urology dose patient's PSA          Fasting blood work  Report any further seizure activity  Follow-up 3 months       The following portions of the patient's history were reviewed and updated as appropriate: allergies, current medications, past family history, past medical history, past social history, past surgical history and problem list.    Vitals:    06/09/22 0819   BP: 130/84   BP Location: Left arm   Patient Position: Sitting   Cuff Size: Adult   Pulse: 75   Temp: 97.6 °F (36.4 °C)   TempSrc: Temporal   SpO2: 98%   Weight: 70.1 kg (154 lb 9.6 oz)   Height: 172.7 cm (68\")     Body mass index is 23.51 kg/m².    Past Medical History:   Diagnosis Date   • Hearing loss    • Hypertension    • Kidney stone    • Mental retardation    • Neurogenic bladder    • Seizure disorder (HCC)     onset 16-17 years old, last seizure about 2015 when weaning phenobarb     Past Surgical " History:   Procedure Laterality Date   • BLADDER REPAIR     • CATARACT EXTRACTION     • ORIF PATELLA FRACTURE Left    • THORACOTOMY     • TYMPANOMASTOIDECTOMY       Family History   Family history unknown: Yes     Immunization History   Administered Date(s) Administered   • COVID-19 (MODERNA) 1st, 2nd, 3rd Dose Only 01/11/2021, 02/08/2021, 10/17/2021   • Flu Vaccine Intradermal Quad 18-64YR 10/05/2018, 11/19/2021   • Flu Vaccine Quad PF >36MO 11/23/2016, 11/12/2019   • FluLaval/Fluarix/Fluzone >6 10/21/2020   • Fluzone Split Quad (Multi-dose) 10/06/2015   • Td 06/13/2014       Office Visit on 05/09/2022   Component Date Value Ref Range Status   • Glucose 05/09/2022 94  65 - 99 mg/dL Final   • BUN 05/09/2022 25  8 - 27 mg/dL Final   • Creatinine 05/09/2022 1.18  0.76 - 1.27 mg/dL Final   • EGFR Result 05/09/2022 69  >59 mL/min/1.73 Final   • BUN/Creatinine Ratio 05/09/2022 21  10 - 24 Final   • Sodium 05/09/2022 134  134 - 144 mmol/L Final   • Potassium 05/09/2022 4.4  3.5 - 5.2 mmol/L Final   • Chloride 05/09/2022 97  96 - 106 mmol/L Final   • Total CO2 05/09/2022 23  20 - 29 mmol/L Final   • Calcium 05/09/2022 9.8  8.6 - 10.2 mg/dL Final   • Total Protein 05/09/2022 6.9  6.0 - 8.5 g/dL Final   • Albumin 05/09/2022 4.4  3.8 - 4.8 g/dL Final   • Globulin 05/09/2022 2.5  1.5 - 4.5 g/dL Final   • A/G Ratio 05/09/2022 1.8  1.2 - 2.2 Final   • Total Bilirubin 05/09/2022 0.2  0.0 - 1.2 mg/dL Final   • Alkaline Phosphatase 05/09/2022 72  44 - 121 IU/L Final   • AST (SGOT) 05/09/2022 17  0 - 40 IU/L Final   • ALT (SGPT) 05/09/2022 17  0 - 44 IU/L Final   • WBC 05/09/2022 4.7  3.4 - 10.8 x10E3/uL Final   • RBC 05/09/2022 4.03 (A) 4.14 - 5.80 x10E6/uL Final   • Hemoglobin 05/09/2022 13.3  13.0 - 17.7 g/dL Final   • Hematocrit 05/09/2022 38.2  37.5 - 51.0 % Final   • MCV 05/09/2022 95  79 - 97 fL Final   • MCH 05/09/2022 33.0  26.6 - 33.0 pg Final   • MCHC 05/09/2022 34.8  31.5 - 35.7 g/dL Final   • RDW 05/09/2022 11.8  11.6 -  15.4 % Final   • Platelets 05/09/2022 189  150 - 450 x10E3/uL Final   • Neutrophil Rel % 05/09/2022 65  Not Estab. % Final   • Lymphocyte Rel % 05/09/2022 21  Not Estab. % Final   • Monocyte Rel % 05/09/2022 11  Not Estab. % Final   • Eosinophil Rel % 05/09/2022 2  Not Estab. % Final   • Basophil Rel % 05/09/2022 1  Not Estab. % Final   • Neutrophils Absolute 05/09/2022 3.0  1.4 - 7.0 x10E3/uL Final   • Lymphocytes Absolute 05/09/2022 1.0  0.7 - 3.1 x10E3/uL Final   • Monocytes Absolute 05/09/2022 0.5  0.1 - 0.9 x10E3/uL Final   • Eosinophils Absolute 05/09/2022 0.1  0.0 - 0.4 x10E3/uL Final   • Basophils Absolute 05/09/2022 0.1  0.0 - 0.2 x10E3/uL Final   • Immature Granulocyte Rel % 05/09/2022 0  Not Estab. % Final   • Immature Grans Absolute 05/09/2022 0.0  0.0 - 0.1 x10E3/uL Final   • Color 05/09/2022 Yellow  Yellow, Straw, Dark Yellow, Lluvia Final   • Clarity, UA 05/09/2022 Clear  Clear Final   • Specific Gravity  05/09/2022 1.020  1.005 - 1.030 Final   • pH, Urine 05/09/2022 6.0  5.0 - 8.0 Final   • Leukocytes 05/09/2022 Trace (A) Negative Final   • Nitrite, UA 05/09/2022 Negative  Negative Final   • Protein, POC 05/09/2022 Negative  Negative mg/dL Final   • Glucose, UA 05/09/2022 Negative  Negative, 1000 mg/dL (3+) mg/dL Final   • Ketones, UA 05/09/2022 Negative  Negative Final   • Urobilinogen, UA 05/09/2022 Normal  Normal Final   • Bilirubin 05/09/2022 Negative (A) Negative Final   • Blood, UA 05/09/2022 Negative  Negative Final   • Lot Number 05/09/2022 [id number]   Final   • Expiration Date 05/09/2022 10/05/2023   Final   • Urine Culture 05/09/2022 Final report   Final   • Result 1 05/09/2022 Comment   Final    Comment: Mixed urogenital klever  10,000-25,000 colony forming units per mL           Review of Systems   Constitutional: Negative.    HENT: Negative.    Respiratory: Negative.    Cardiovascular: Negative.    Gastrointestinal: Negative.    Genitourinary: Negative.    Musculoskeletal: Negative.     Skin: Negative.    Neurological: Positive for seizures.   Psychiatric/Behavioral: Negative.        Objective   Physical Exam  Constitutional:       Appearance: Normal appearance.   HENT:      Head: Normocephalic.   Cardiovascular:      Rate and Rhythm: Normal rate and regular rhythm.      Pulses: Normal pulses.      Heart sounds: Normal heart sounds.   Pulmonary:      Effort: Pulmonary effort is normal.      Breath sounds: Normal breath sounds.   Abdominal:      General: Bowel sounds are normal.   Musculoskeletal:         General: Normal range of motion.   Skin:     General: Skin is warm and dry.   Neurological:      General: No focal deficit present.      Mental Status: He is alert and oriented to person, place, and time.      Comments: Normal for this patient   Psychiatric:         Mood and Affect: Mood normal.         Behavior: Behavior normal.         Procedures    Assessment & Plan   Diagnoses and all orders for this visit:    1. High cholesterol (Primary)  -     Lipid Panel With LDL / HDL Ratio; Future    2. Anemia due to chronic kidney disease, unspecified CKD stage  -     CBC & Differential; Future    3. Seizure disorder (HCC)  -     Phenobarbital level; Future    4. Hyponatremia  -     Comprehensive Metabolic Panel; Future    5. BMI 23.0-23.9, adult          Current Outpatient Medications:   •  acetaminophen (TYLENOL) 325 MG tablet, Take 2 tablets by mouth Every 4 (Four) Hours As Needed., Disp: , Rfl:   •  alendronate (FOSAMAX) 70 MG tablet, TAKE 1 TABLET BY MOUTH WEEKLY .TAKE WITH 8OZ WATER BEFORE ANY FOOD OR DRINK. DO NOT LIE DOWN FOR 30 MINUTES, Disp: 4 tablet, Rfl: 0  •  brimonidine-timolol (COMBIGAN) 0.2-0.5 % ophthalmic solution, Administer 1 drop to both eyes 2 (Two) Times a Day., Disp: , Rfl:   •  calcium carbonate-vitamin d 600-400 MG-UNIT per tablet, TAKE ONE TABLET BY MOUTH TWICE DAILY, Disp: 60 tablet, Rfl: 0  •  chlorhexidine (PERIDEX) 0.12 % solution, , Disp: , Rfl:   •  DOCUSIL 100 MG  capsule, TAKE ONE (1) CAPSULE BY MOUTH TWICE A DAY, Disp: 60 capsule, Rfl: 3  •  famotidine (PEPCID) 20 MG tablet, TAKE 1 TABLET BY MOUTH TWICE DAILY, Disp: 60 tablet, Rfl: 7  •  fenofibrate (TRICOR) 48 MG tablet, TAKE 1 TABLET BY MOUTH AT BEDTIME, Disp: 30 tablet, Rfl: 0  •  FeroSul 325 (65 Fe) MG tablet, TAKE 1 TABLET BY MOUTH TWICE DAILY, Disp: 60 tablet, Rfl: 0  •  fluticasone (FLONASE) 50 MCG/ACT nasal spray, 2 sprays into the nostril(s) as directed by provider Daily., Disp: 1 bottle, Rfl: 1  •  GNP Allergy 4 MG tablet, TAKE 1 TABLET BY MOUTH 4 TO 6 HOURS AS NEEDED FOR ALLERGIES AND RUNNY NOSE, Disp: 24 tablet, Rfl: 0  •  GNP Antacid Regular Strength 200-200-20 MG/5ML suspension, , Disp: , Rfl:   •  GNP Milk of Magnesia 1200 MG/15ML suspension, , Disp: , Rfl:   •  hydrocortisone 2.5 % cream, , Disp: , Rfl:   •  loperamide (IMODIUM) 2 MG capsule, , Disp: , Rfl:   •  loratadine (CLARITIN) 10 MG tablet, TAKE 1 TABLET BY MOUTH EVERY DAY, Disp: 30 tablet, Rfl: 0  •  mupirocin (BACTROBAN) 2 % ointment, APPLY TO RIGHT 5TH TOE TWICE DAILY, Disp: , Rfl:   •  Neomycin-Bacitracin-Polymyxin (GNP TRIPLE ANTIBIOTIC) 3.5-400-5000 ointment, FOLLOW DIRECTIONS ON LABEL AS NEEDED TO PREVENT INFECTIONS IN MINOR CUTS AND SCRAPES. IF NO IMPROVEMENT IN 48 HOURS NOTIFY NURSE, Disp: 28.4 g, Rfl: 12  •  omeprazole (priLOSEC) 20 MG capsule, TAKE 1 CAPSULE BY MOUTH EVERY DAY AS NEEDED ( BUBBLE ), Disp: 30 capsule, Rfl: 0  •  OXcarbazepine (TRILEPTAL) 600 MG tablet, TAKE 1 TABLET BY MOUTH EVERY MORNING AND 2 TABLETS EVERY EVENING, Disp: 90 tablet, Rfl: 10  •  PHENobarbital (LUMINAL) 97.2 MG tablet, TAKE 1 TABLET BY MOUTH EVERY NIGHT AT BEDTIME * NARC SHEET*, Disp: 30 tablet, Rfl: 4  •  polyethylene glycol (MiraLax) 17 GM/SCOOP powder, Take 17 g by mouth Daily. Mix with water 17g daily with 8oz water prn, Disp: 1 each, Rfl: 2  •  Psyllium (Metamucil [facility] Fiber) 55.46 % powder, Take 1 teaspoon(s) by mouth 2 (Two) Times a Day. 1 tsp  1-2 x day as needed, Disp: 1 g, Rfl: 1  •  risperiDONE (risperDAL) 2 MG tablet, Take 1 tablet by mouth 2 (Two) Times a Day., Disp: 60 tablet, Rfl: 5  •  tamsulosin (FLOMAX) 0.4 MG capsule 24 hr capsule, Take 1 capsule by mouth Daily., Disp: , Rfl:            HORTENCIA Valentine 6/9/2022 09:26 EDT  This note has been electronically signed

## 2022-06-11 LAB
ALBUMIN SERPL-MCNC: 4.1 G/DL (ref 3.8–4.8)
ALBUMIN/GLOB SERPL: 1.6 {RATIO} (ref 1.2–2.2)
ALP SERPL-CCNC: 68 IU/L (ref 44–121)
ALT SERPL-CCNC: 16 IU/L (ref 0–44)
AST SERPL-CCNC: 17 IU/L (ref 0–40)
BASOPHILS # BLD AUTO: 0.1 X10E3/UL (ref 0–0.2)
BASOPHILS NFR BLD AUTO: 1 %
BILIRUB SERPL-MCNC: 0.2 MG/DL (ref 0–1.2)
BUN SERPL-MCNC: 20 MG/DL (ref 8–27)
BUN/CREAT SERPL: 17 (ref 10–24)
CALCIUM SERPL-MCNC: 9.6 MG/DL (ref 8.6–10.2)
CHLORIDE SERPL-SCNC: 97 MMOL/L (ref 96–106)
CHOLEST SERPL-MCNC: 225 MG/DL (ref 100–199)
CO2 SERPL-SCNC: 24 MMOL/L (ref 20–29)
CREAT SERPL-MCNC: 1.15 MG/DL (ref 0.76–1.27)
EGFRCR SERPLBLD CKD-EPI 2021: 71 ML/MIN/1.73
EOSINOPHIL # BLD AUTO: 0.2 X10E3/UL (ref 0–0.4)
EOSINOPHIL NFR BLD AUTO: 3 %
ERYTHROCYTE [DISTWIDTH] IN BLOOD BY AUTOMATED COUNT: 12.1 % (ref 11.6–15.4)
GLOBULIN SER CALC-MCNC: 2.6 G/DL (ref 1.5–4.5)
GLUCOSE SERPL-MCNC: 86 MG/DL (ref 65–99)
HCT VFR BLD AUTO: 38.3 % (ref 37.5–51)
HDLC SERPL-MCNC: 64 MG/DL
HGB BLD-MCNC: 13.1 G/DL (ref 13–17.7)
IMM GRANULOCYTES # BLD AUTO: 0 X10E3/UL (ref 0–0.1)
IMM GRANULOCYTES NFR BLD AUTO: 0 %
LDLC SERPL CALC-MCNC: 139 MG/DL (ref 0–99)
LDLC/HDLC SERPL: 2.2 RATIO (ref 0–3.6)
LYMPHOCYTES # BLD AUTO: 0.9 X10E3/UL (ref 0.7–3.1)
LYMPHOCYTES NFR BLD AUTO: 18 %
MCH RBC QN AUTO: 32.9 PG (ref 26.6–33)
MCHC RBC AUTO-ENTMCNC: 34.2 G/DL (ref 31.5–35.7)
MCV RBC AUTO: 96 FL (ref 79–97)
MONOCYTES # BLD AUTO: 0.6 X10E3/UL (ref 0.1–0.9)
MONOCYTES NFR BLD AUTO: 11 %
NEUTROPHILS # BLD AUTO: 3.5 X10E3/UL (ref 1.4–7)
NEUTROPHILS NFR BLD AUTO: 67 %
PHENOBARB SERPL-MCNC: 18 UG/ML (ref 15–40)
PLATELET # BLD AUTO: 200 X10E3/UL (ref 150–450)
POTASSIUM SERPL-SCNC: 4 MMOL/L (ref 3.5–5.2)
PROT SERPL-MCNC: 6.7 G/DL (ref 6–8.5)
RBC # BLD AUTO: 3.98 X10E6/UL (ref 4.14–5.8)
SODIUM SERPL-SCNC: 136 MMOL/L (ref 134–144)
TRIGL SERPL-MCNC: 124 MG/DL (ref 0–149)
VLDLC SERPL CALC-MCNC: 22 MG/DL (ref 5–40)
WBC # BLD AUTO: 5.2 X10E3/UL (ref 3.4–10.8)

## 2022-06-20 RX ORDER — LORATADINE 10 MG/1
TABLET ORAL
Qty: 30 TABLET | Refills: 0 | Status: SHIPPED | OUTPATIENT
Start: 2022-06-20 | End: 2022-07-15

## 2022-06-20 RX ORDER — FERROUS SULFATE 325(65) MG
TABLET ORAL
Qty: 60 TABLET | Refills: 0 | Status: SHIPPED | OUTPATIENT
Start: 2022-06-20 | End: 2022-07-15

## 2022-06-20 RX ORDER — CALCIUM CARBONATE/VITAMIN D3 600 MG-10
TABLET ORAL
Qty: 60 TABLET | Refills: 0 | Status: SHIPPED | OUTPATIENT
Start: 2022-06-20 | End: 2022-07-15

## 2022-06-20 RX ORDER — ALENDRONATE SODIUM 70 MG/1
TABLET ORAL
Qty: 4 TABLET | Refills: 0 | Status: SHIPPED | OUTPATIENT
Start: 2022-06-20 | End: 2022-07-15

## 2022-06-20 RX ORDER — FENOFIBRATE 48 MG/1
TABLET, COATED ORAL
Qty: 30 TABLET | Refills: 0 | Status: SHIPPED | OUTPATIENT
Start: 2022-06-20 | End: 2022-07-18

## 2022-07-15 DIAGNOSIS — G40.109 SEIZURE, TEMPORAL LOBE: ICD-10-CM

## 2022-07-15 RX ORDER — FERROUS SULFATE 325(65) MG
TABLET ORAL
Qty: 60 TABLET | Refills: 0 | Status: SHIPPED | OUTPATIENT
Start: 2022-07-15 | End: 2022-08-15

## 2022-07-15 RX ORDER — ALENDRONATE SODIUM 70 MG/1
TABLET ORAL
Qty: 4 TABLET | Refills: 0 | Status: SHIPPED | OUTPATIENT
Start: 2022-07-15 | End: 2022-08-15

## 2022-07-15 RX ORDER — LORATADINE 10 MG/1
TABLET ORAL
Qty: 30 TABLET | Refills: 0 | Status: SHIPPED | OUTPATIENT
Start: 2022-07-15 | End: 2022-08-15

## 2022-07-15 RX ORDER — CALCIUM CARBONATE/VITAMIN D3 600 MG-10
TABLET ORAL
Qty: 60 TABLET | Refills: 0 | Status: SHIPPED | OUTPATIENT
Start: 2022-07-15 | End: 2022-08-15

## 2022-07-16 RX ORDER — PHENOBARBITAL 97.2 MG/1
TABLET ORAL
Qty: 30 TABLET | Refills: 3 | Status: SHIPPED | OUTPATIENT
Start: 2022-07-16 | End: 2022-11-07

## 2022-07-18 RX ORDER — FENOFIBRATE 48 MG/1
TABLET, COATED ORAL
Qty: 30 TABLET | Refills: 0 | Status: SHIPPED | OUTPATIENT
Start: 2022-07-18 | End: 2022-08-15

## 2022-07-20 ENCOUNTER — TELEPHONE (OUTPATIENT)
Dept: PSYCHIATRY | Facility: CLINIC | Age: 62
End: 2022-07-20

## 2022-07-20 NOTE — TELEPHONE ENCOUNTER
Tried to call pt to reschedule appt due to provider being out of office on 10/12/2022.  There was no answer and I could not leave a vm.

## 2022-07-20 NOTE — TELEPHONE ENCOUNTER
[email or web address].[email or web address] lives in a group home with [facility] so it will be the  or his guardian to reach due to his intellectual disability.

## 2022-08-13 DIAGNOSIS — F20.0 PARANOID SCHIZOPHRENIA: ICD-10-CM

## 2022-08-14 RX ORDER — RISPERIDONE 2 MG/1
TABLET ORAL
Qty: 60 TABLET | Refills: 5 | Status: SHIPPED | OUTPATIENT
Start: 2022-08-14 | End: 2023-01-30

## 2022-08-15 RX ORDER — LORATADINE 10 MG/1
TABLET ORAL
Qty: 30 TABLET | Refills: 0 | Status: SHIPPED | OUTPATIENT
Start: 2022-08-15 | End: 2022-09-09

## 2022-08-15 RX ORDER — ALENDRONATE SODIUM 70 MG/1
TABLET ORAL
Qty: 4 TABLET | Refills: 0 | Status: SHIPPED | OUTPATIENT
Start: 2022-08-15 | End: 2022-09-09

## 2022-08-15 RX ORDER — FENOFIBRATE 48 MG/1
TABLET, COATED ORAL
Qty: 30 TABLET | Refills: 0 | Status: SHIPPED | OUTPATIENT
Start: 2022-08-15 | End: 2022-09-09

## 2022-08-15 RX ORDER — FERROUS SULFATE 325(65) MG
TABLET ORAL
Qty: 60 TABLET | Refills: 0 | Status: SHIPPED | OUTPATIENT
Start: 2022-08-15 | End: 2022-09-09

## 2022-08-15 RX ORDER — CALCIUM CARBONATE/VITAMIN D3 600 MG-10
TABLET ORAL
Qty: 60 TABLET | Refills: 0 | Status: SHIPPED | OUTPATIENT
Start: 2022-08-15 | End: 2022-09-09

## 2022-09-09 RX ORDER — ALENDRONATE SODIUM 70 MG/1
TABLET ORAL
Qty: 4 TABLET | Refills: 0 | Status: SHIPPED | OUTPATIENT
Start: 2022-09-09 | End: 2022-10-10

## 2022-09-09 RX ORDER — CALCIUM CARBONATE/VITAMIN D3 600 MG-10
TABLET ORAL
Qty: 60 TABLET | Refills: 0 | Status: SHIPPED | OUTPATIENT
Start: 2022-09-09 | End: 2022-10-10

## 2022-09-09 RX ORDER — FERROUS SULFATE 325(65) MG
TABLET ORAL
Qty: 60 TABLET | Refills: 0 | Status: SHIPPED | OUTPATIENT
Start: 2022-09-09 | End: 2022-10-10

## 2022-09-09 RX ORDER — FENOFIBRATE 48 MG/1
TABLET, COATED ORAL
Qty: 30 TABLET | Refills: 0 | Status: SHIPPED | OUTPATIENT
Start: 2022-09-09 | End: 2022-10-10

## 2022-09-09 RX ORDER — LORATADINE 10 MG/1
TABLET ORAL
Qty: 30 TABLET | Refills: 0 | Status: SHIPPED | OUTPATIENT
Start: 2022-09-09 | End: 2022-10-10

## 2022-09-14 ENCOUNTER — OFFICE VISIT (OUTPATIENT)
Dept: FAMILY MEDICINE CLINIC | Facility: CLINIC | Age: 62
End: 2022-09-14

## 2022-09-14 VITALS
OXYGEN SATURATION: 97 % | DIASTOLIC BLOOD PRESSURE: 87 MMHG | HEART RATE: 68 BPM | SYSTOLIC BLOOD PRESSURE: 151 MMHG | HEIGHT: 68 IN | WEIGHT: 154 LBS | BODY MASS INDEX: 23.34 KG/M2 | TEMPERATURE: 97.1 F

## 2022-09-14 DIAGNOSIS — Z00.00 ENCOUNTER FOR GENERAL ADULT MEDICAL EXAMINATION WITHOUT ABNORMAL FINDINGS: ICD-10-CM

## 2022-09-14 DIAGNOSIS — I10 HYPERTENSION, BENIGN: Primary | ICD-10-CM

## 2022-09-14 DIAGNOSIS — G40.909 SEIZURE DISORDER: ICD-10-CM

## 2022-09-14 PROCEDURE — 99213 OFFICE O/P EST LOW 20 MIN: CPT | Performed by: NURSE PRACTITIONER

## 2022-09-14 NOTE — PATIENT INSTRUCTIONS
Maintain weight so remained stable  Monitor diastolic blood pressure make sure it stays predominantly below 84  Follow-up 3 months

## 2022-09-14 NOTE — PROGRESS NOTES
"    Manuel Gaspar is a 62 y.o. male.     History of Present Illness  62-year-old white male lives in a group home with history of MR, anxiety, schizophrenia, osteoporosis, hypertension, kidney stones, chronic anemia, neurogenic bladder, hearing loss, chronic hyponatremia, chronic UTIs and seizure disorder who comes in today for 3-month follow-up visit    Blood pressure 150/86 heart rate 68 he denies any chest pain, dyspnea, tachycardia or dizziness.  Patient's blood pressure is a little higher than normal for him but on review of past blood pressures they have been pretty stable we will just monitor and I gave facility parameters to monitor diastolic blood pressure with    Patient does see neurology for seizures and neurology no active seizure activity nor has had any UTIs in the last 3 to 4 months.  He does see psychiatry on a regular basis    Weight is unchanged at 154 with a BMI 23.5 and as other occupants of the facility they are caught up on immunizations and preventative maintenance              Maintain weight so remained stable  Monitor diastolic blood pressure make sure it stays predominantly below 84  Follow-up 3 months       The following portions of the patient's history were reviewed and updated as appropriate: allergies, current medications, past family history, past medical history, past social history, past surgical history and problem list.    Vitals:    09/14/22 1337   BP: 151/87   BP Location: Right arm   Patient Position: Sitting   Cuff Size: Adult   Pulse: 68   Temp: 97.1 °F (36.2 °C)   TempSrc: Temporal   SpO2: 97%   Weight: 69.9 kg (154 lb)   Height: 172.7 cm (68\")     Body mass index is 23.42 kg/m².    Past Medical History:   Diagnosis Date   • Hearing loss    • Hypertension    • Kidney stone    • Mental retardation    • Neurogenic bladder    • Seizure disorder (HCC)     onset 16-17 years old, last seizure about 2015 when weaning phenobarb     Past Surgical History:   Procedure Laterality Date "   • BLADDER REPAIR     • CATARACT EXTRACTION     • ORIF PATELLA FRACTURE Left    • THORACOTOMY     • TYMPANOMASTOIDECTOMY       Family History   Family history unknown: Yes     Immunization History   Administered Date(s) Administered   • COVID-19 (MODERNA) 1st, 2nd, 3rd Dose Only 01/11/2021, 02/08/2021, 10/17/2021   • Flu Vaccine Intradermal Quad 18-64YR 10/05/2018, 11/19/2021   • Flu Vaccine Quad PF >36MO 11/23/2016, 11/12/2019   • FluLaval/Fluzone >6mos 10/21/2020   • Fluzone Quad >6mos (Multi-dose) 10/06/2015   • Td 06/13/2014       Office Visit on 06/09/2022   Component Date Value Ref Range Status   • Phenobarbital 06/10/2022 18  15 - 40 ug/mL Final                                    Detection Limit = 3   • Total Cholesterol 06/10/2022 225 (A) 100 - 199 mg/dL Final   • Triglycerides 06/10/2022 124  0 - 149 mg/dL Final   • HDL Cholesterol 06/10/2022 64  >39 mg/dL Final   • VLDL Cholesterol Damon 06/10/2022 22  5 - 40 mg/dL Final   • LDL Chol Calc ([facility]) 06/10/2022 139 (A) 0 - 99 mg/dL Final   • LDL/HDL RATIO 06/10/2022 2.2  0.0 - 3.6 ratio Final    Comment:                                     LDL/HDL Ratio                                              Men  Women                                1/2 Avg.Risk  1.0    1.5                                    Avg.Risk  3.6    3.2                                 2X Avg.Risk  6.2    5.0                                 3X Avg.Risk  8.0    6.1     • Glucose 06/10/2022 86  65 - 99 mg/dL Final   • BUN 06/10/2022 20  8 - 27 mg/dL Final   • Creatinine 06/10/2022 1.15  0.76 - 1.27 mg/dL Final   • EGFR Result 06/10/2022 71  >59 mL/min/1.73 Final   • BUN/Creatinine Ratio 06/10/2022 17  10 - 24 Final   • Sodium 06/10/2022 136  134 - 144 mmol/L Final   • Potassium 06/10/2022 4.0  3.5 - 5.2 mmol/L Final   • Chloride 06/10/2022 97  96 - 106 mmol/L Final   • Total CO2 06/10/2022 24  20 - 29 mmol/L Final   • Calcium 06/10/2022 9.6  8.6 - 10.2 mg/dL Final   • Total Protein 06/10/2022 6.7  6.0  - 8.5 g/dL Final   • Albumin 06/10/2022 4.1  3.8 - 4.8 g/dL Final   • Globulin 06/10/2022 2.6  1.5 - 4.5 g/dL Final   • A/G Ratio 06/10/2022 1.6  1.2 - 2.2 Final   • Total Bilirubin 06/10/2022 0.2  0.0 - 1.2 mg/dL Final   • Alkaline Phosphatase 06/10/2022 68  44 - 121 IU/L Final   • AST (SGOT) 06/10/2022 17  0 - 40 IU/L Final   • ALT (SGPT) 06/10/2022 16  0 - 44 IU/L Final   • WBC 06/10/2022 5.2  3.4 - 10.8 x10E3/uL Final   • RBC 06/10/2022 3.98 (A) 4.14 - 5.80 x10E6/uL Final   • Hemoglobin 06/10/2022 13.1  13.0 - 17.7 g/dL Final   • Hematocrit 06/10/2022 38.3  37.5 - 51.0 % Final   • MCV 06/10/2022 96  79 - 97 fL Final   • MCH 06/10/2022 32.9  26.6 - 33.0 pg Final   • MCHC 06/10/2022 34.2  31.5 - 35.7 g/dL Final   • RDW 06/10/2022 12.1  11.6 - 15.4 % Final   • Platelets 06/10/2022 200  150 - 450 x10E3/uL Final   • Neutrophil Rel % 06/10/2022 67  Not Estab. % Final   • Lymphocyte Rel % 06/10/2022 18  Not Estab. % Final   • Monocyte Rel % 06/10/2022 11  Not Estab. % Final   • Eosinophil Rel % 06/10/2022 3  Not Estab. % Final   • Basophil Rel % 06/10/2022 1  Not Estab. % Final   • Neutrophils Absolute 06/10/2022 3.5  1.4 - 7.0 x10E3/uL Final   • Lymphocytes Absolute 06/10/2022 0.9  0.7 - 3.1 x10E3/uL Final   • Monocytes Absolute 06/10/2022 0.6  0.1 - 0.9 x10E3/uL Final   • Eosinophils Absolute 06/10/2022 0.2  0.0 - 0.4 x10E3/uL Final   • Basophils Absolute 06/10/2022 0.1  0.0 - 0.2 x10E3/uL Final   • Immature Granulocyte Rel % 06/10/2022 0  Not Estab. % Final   • Immature Grans Absolute 06/10/2022 0.0  0.0 - 0.1 x10E3/uL Final         Review of Systems   Constitutional: Negative.    HENT: Negative.    Respiratory: Negative.    Cardiovascular: Negative.    Gastrointestinal: Negative.    Genitourinary: Negative.    Musculoskeletal: Negative.    Skin: Negative.    Neurological: Negative.    Psychiatric/Behavioral: Negative.        Objective   Physical Exam  Constitutional:       Appearance: Normal appearance.   HENT:       Head: Normocephalic.   Cardiovascular:      Rate and Rhythm: Normal rate and regular rhythm.      Pulses: Normal pulses.      Heart sounds: Normal heart sounds.   Pulmonary:      Effort: Pulmonary effort is normal.      Breath sounds: Normal breath sounds.   Abdominal:      General: Bowel sounds are normal.   Musculoskeletal:         General: Normal range of motion.   Skin:     General: Skin is warm and dry.   Neurological:      General: No focal deficit present.      Mental Status: He is alert and oriented to person, place, and time.   Psychiatric:         Mood and Affect: Mood normal.         Procedures    Assessment & Plan   Diagnoses and all orders for this visit:    1. Hypertension, benign (Primary)    2. BMI 23.0-23.9, adult    3. Encounter for general adult medical examination without abnormal findings    4. Seizure disorder (HCC)          Current Outpatient Medications:   •  acetaminophen (TYLENOL) 325 MG tablet, Take 2 tablets by mouth Every 4 (Four) Hours As Needed., Disp: , Rfl:   •  alendronate (FOSAMAX) 70 MG tablet, TAKE 1 TABLET BY MOUTH WEEKLY .TAKE WITH 8OZ WATER BEFORE ANY FOOD OR DRINK. DO NOT LIE DOWN FOR 30 MINUTES, Disp: 4 tablet, Rfl: 0  •  brimonidine-timolol (COMBIGAN) 0.2-0.5 % ophthalmic solution, Administer 1 drop to both eyes 2 (Two) Times a Day., Disp: , Rfl:   •  Calcium + Vitamin D3 600-10 MG-MCG tablet, TAKE 1 TABLET BY MOUTH TWICE DAILY, Disp: 60 tablet, Rfl: 0  •  chlorhexidine (PERIDEX) 0.12 % solution, , Disp: , Rfl:   •  DOCUSIL 100 MG capsule, TAKE ONE (1) CAPSULE BY MOUTH TWICE A DAY, Disp: 60 capsule, Rfl: 3  •  famotidine (PEPCID) 20 MG tablet, TAKE 1 TABLET BY MOUTH TWICE DAILY, Disp: 60 tablet, Rfl: 7  •  fenofibrate (TRICOR) 48 MG tablet, TAKE 1 TABLET BY MOUTH AT BEDTIME, Disp: 30 tablet, Rfl: 0  •  FeroSul 325 (65 Fe) MG tablet, TAKE 1 TABLET BY MOUTH TWICE DAILY, Disp: 60 tablet, Rfl: 0  •  fluticasone (FLONASE) 50 MCG/ACT nasal spray, 2 sprays into the nostril(s) as  directed by provider Daily., Disp: 1 bottle, Rfl: 1  •  GNP Allergy 4 MG tablet, TAKE 1 TABLET BY MOUTH 4 TO 6 HOURS AS NEEDED FOR ALLERGIES AND RUNNY NOSE, Disp: 24 tablet, Rfl: 0  •  GNP Antacid Regular Strength 200-200-20 MG/5ML suspension, , Disp: , Rfl:   •  GNP Milk of Magnesia 1200 MG/15ML suspension, , Disp: , Rfl:   •  hydrocortisone 2.5 % cream, , Disp: , Rfl:   •  loperamide (IMODIUM) 2 MG capsule, , Disp: , Rfl:   •  loratadine (CLARITIN) 10 MG tablet, TAKE 1 TABLET BY MOUTH EVERY DAY, Disp: 30 tablet, Rfl: 0  •  mupirocin (BACTROBAN) 2 % ointment, APPLY TO RIGHT 5TH TOE TWICE DAILY, Disp: , Rfl:   •  Neomycin-Bacitracin-Polymyxin (GNP TRIPLE ANTIBIOTIC) 3.5-400-5000 ointment, FOLLOW DIRECTIONS ON LABEL AS NEEDED TO PREVENT INFECTIONS IN MINOR CUTS AND SCRAPES. IF NO IMPROVEMENT IN 48 HOURS NOTIFY NURSE, Disp: 28.4 g, Rfl: 12  •  omeprazole (priLOSEC) 20 MG capsule, TAKE 1 CAPSULE BY MOUTH EVERY DAY AS NEEDED ( BUBBLE ), Disp: 30 capsule, Rfl: 0  •  OXcarbazepine (TRILEPTAL) 600 MG tablet, TAKE 1 TABLET BY MOUTH EVERY MORNING AND 2 TABLETS EVERY EVENING, Disp: 90 tablet, Rfl: 10  •  PHENobarbital (LUMINAL) 97.2 MG tablet, TAKE 1 TABLET BY MOUTH EVERY NIGHT AT BEDTIME * NARC SHEET*, Disp: 30 tablet, Rfl: 3  •  polyethylene glycol (MiraLax) 17 GM/SCOOP powder, Take 17 g by mouth Daily. Mix with water 17g daily with 8oz water prn, Disp: 1 each, Rfl: 2  •  Psyllium (Metamucil [facility] Fiber) 55.46 % powder, Take 1 teaspoon(s) by mouth 2 (Two) Times a Day. 1 tsp 1-2 x day as needed, Disp: 1 g, Rfl: 1  •  risperiDONE (risperDAL) 2 MG tablet, TAKE 1 TABLET BY MOUTH TWICE DAILY (INCREASED DOSE INTO MARCH PACKS), Disp: 60 tablet, Rfl: 5  •  tamsulosin (FLOMAX) 0.4 MG capsule 24 hr capsule, Take 1 capsule by mouth Daily., Disp: , Rfl:   •  tolnaftate (TINACTIN) 1 % external solution, Apply twice a day to affected area until resolved, Disp: 29.5 mL, Rfl: 2           Dang Holder, APRN 9/14/2022 14:41 EDT  This  note has been electronically signed

## 2022-10-10 RX ORDER — CALCIUM CARBONATE/VITAMIN D3 600 MG-10
TABLET ORAL
Qty: 60 TABLET | Refills: 0 | Status: SHIPPED | OUTPATIENT
Start: 2022-10-10 | End: 2022-11-09

## 2022-10-10 RX ORDER — FENOFIBRATE 48 MG/1
TABLET, COATED ORAL
Qty: 30 TABLET | Refills: 0 | Status: SHIPPED | OUTPATIENT
Start: 2022-10-10 | End: 2022-11-07

## 2022-10-10 RX ORDER — LORATADINE 10 MG/1
TABLET ORAL
Qty: 30 TABLET | Refills: 0 | Status: SHIPPED | OUTPATIENT
Start: 2022-10-10 | End: 2022-11-07

## 2022-10-10 RX ORDER — ALENDRONATE SODIUM 70 MG/1
TABLET ORAL
Qty: 4 TABLET | Refills: 0 | Status: SHIPPED | OUTPATIENT
Start: 2022-10-10 | End: 2022-11-07

## 2022-10-10 RX ORDER — FERROUS SULFATE 325(65) MG
TABLET ORAL
Qty: 60 TABLET | Refills: 0 | Status: SHIPPED | OUTPATIENT
Start: 2022-10-10 | End: 2022-11-07

## 2022-11-03 ENCOUNTER — OFFICE VISIT (OUTPATIENT)
Dept: FAMILY MEDICINE CLINIC | Facility: CLINIC | Age: 62
End: 2022-11-03

## 2022-11-03 VITALS
TEMPERATURE: 97.5 F | HEIGHT: 68 IN | DIASTOLIC BLOOD PRESSURE: 92 MMHG | BODY MASS INDEX: 23.34 KG/M2 | WEIGHT: 154 LBS | HEART RATE: 74 BPM | OXYGEN SATURATION: 98 % | SYSTOLIC BLOOD PRESSURE: 165 MMHG

## 2022-11-03 DIAGNOSIS — R30.9 PAINFUL URINATION: Primary | ICD-10-CM

## 2022-11-03 DIAGNOSIS — R30.0 DIFFICULT OR PAINFUL URINATION: ICD-10-CM

## 2022-11-03 DIAGNOSIS — R31.9 HEMATURIA, UNSPECIFIED TYPE: ICD-10-CM

## 2022-11-03 DIAGNOSIS — I10 HYPERTENSION, BENIGN: ICD-10-CM

## 2022-11-03 LAB
BILIRUB BLD-MCNC: NEGATIVE MG/DL
CLARITY, POC: CLEAR
COLOR UR: YELLOW
EXPIRATION DATE: ABNORMAL
GLUCOSE UR STRIP-MCNC: NEGATIVE MG/DL
KETONES UR QL: NEGATIVE
LEUKOCYTE EST, POC: NEGATIVE
Lab: ABNORMAL
NITRITE UR-MCNC: NEGATIVE MG/ML
PH UR: 6 [PH] (ref 5–8)
PROT UR STRIP-MCNC: NEGATIVE MG/DL
RBC # UR STRIP: ABNORMAL /UL
SP GR UR: 1.01 (ref 1–1.03)
UROBILINOGEN UR QL: NORMAL

## 2022-11-03 PROCEDURE — 99214 OFFICE O/P EST MOD 30 MIN: CPT | Performed by: NURSE PRACTITIONER

## 2022-11-03 PROCEDURE — 81003 URINALYSIS AUTO W/O SCOPE: CPT | Performed by: NURSE PRACTITIONER

## 2022-11-03 RX ORDER — LISINOPRIL 10 MG/1
10 TABLET ORAL DAILY
Qty: 30 TABLET | Refills: 2 | Status: SHIPPED | OUTPATIENT
Start: 2022-11-03 | End: 2022-11-03

## 2022-11-03 RX ORDER — LISINOPRIL 20 MG/1
20 TABLET ORAL DAILY
Qty: 30 TABLET | Refills: 2 | Status: SHIPPED | OUTPATIENT
Start: 2022-11-03 | End: 2022-11-07 | Stop reason: SDUPTHER

## 2022-11-03 RX ORDER — NITROFURANTOIN 25; 75 MG/1; MG/1
100 CAPSULE ORAL 2 TIMES DAILY
Qty: 20 CAPSULE | Refills: 0 | Status: SHIPPED | OUTPATIENT
Start: 2022-11-03

## 2022-11-03 RX ORDER — PHENAZOPYRIDINE HYDROCHLORIDE 100 MG/1
100 TABLET, FILM COATED ORAL 3 TIMES DAILY PRN
Qty: 6 TABLET | Refills: 0 | Status: SHIPPED | OUTPATIENT
Start: 2022-11-03

## 2022-11-03 NOTE — PATIENT INSTRUCTIONS
Macrobid 100 mg twice daily x10 days  Pyridium 100 mg 3 times daily x2 days  Lisinopril 10 mg daily  Monitor blood pressure with parameters given  Any worsening of urinary tract symptoms or flank pain call office  Increase fluid intake

## 2022-11-03 NOTE — PROGRESS NOTES
"    Manuel Gaspar is a 62 y.o. male.     History of Present Illness  62-year-old white male lives in a group home with history of MR, anxiety, schizophrenia, osteoporosis, hypertension, kidney stones, chronic anemia, neurogenic bladder, hearing loss, chronic hyponatremia, chronic UTIs and seizure disorder who comes in today with several day history of dysuria and pelvic pain.  Urinalysis showed blood but no bacteria or nitrites.  Patient does have a history of  kidney stones with CT in August 2021 showing stones in both kidneys.  I placed him on antibiotic along with some Pyridium for the symptoms.  He currently has not complained of flank pain however I told the facility if symptoms do not improve or if he starts developing flank pain we will probably need to get a CT stone protocol    Patient's blood pressure is also been running high last couple visits.  It is 164/92 today.  He had a place him on a low-dose lisinopril gave parameters to monitor blood pressure with a normal renal of it does not stay in that parameter the facility              Macrobid 100 mg twice daily x10 days  Pyridium 100 mg 3 times daily x2 days  Lisinopril 10 mg daily  Monitor blood pressure with parameters given  Any worsening of urinary tract symptoms or flank pain call office  Increase fluid intake       The following portions of the patient's history were reviewed and updated as appropriate: allergies, current medications, past family history, past medical history, past social history, past surgical history and problem list.    Vitals:    11/03/22 1408   BP: 165/92   BP Location: Left arm   Patient Position: Sitting   Cuff Size: Adult   Pulse: 74   Temp: 97.5 °F (36.4 °C)   TempSrc: Temporal   SpO2: 98%   Weight: 69.9 kg (154 lb)   Height: 172.7 cm (68\")     Body mass index is 23.42 kg/m².    Past Medical History:   Diagnosis Date   • Hearing loss    • Hypertension    • Kidney stone    • Mental retardation    • Neurogenic bladder    • " Seizure disorder (HCC)     onset 16-17 years old, last seizure about 2015 when weaning phenobarb     Past Surgical History:   Procedure Laterality Date   • BLADDER REPAIR     • CATARACT EXTRACTION     • ORIF PATELLA FRACTURE Left    • THORACOTOMY     • TYMPANOMASTOIDECTOMY       Family History   Family history unknown: Yes     Immunization History   Administered Date(s) Administered   • COVID-19 (MODERNA) 1st, 2nd, 3rd Dose Only 01/11/2021, 02/08/2021, 10/17/2021   • Flu Vaccine Intradermal Quad 18-64YR 10/05/2018, 11/19/2021   • Flu Vaccine Quad PF >36MO 11/23/2016, 11/12/2019   • FluLaval/Fluzone >6mos 10/21/2020   • Fluzone Quad >6mos (Multi-dose) 10/06/2015   • Td 06/13/2014       Office Visit on 06/09/2022   Component Date Value Ref Range Status   • Phenobarbital 06/10/2022 18  15 - 40 ug/mL Final                                    Detection Limit = 3   • Total Cholesterol 06/10/2022 225 (H)  100 - 199 mg/dL Final   • Triglycerides 06/10/2022 124  0 - 149 mg/dL Final   • HDL Cholesterol 06/10/2022 64  >39 mg/dL Final   • VLDL Cholesterol Damon 06/10/2022 22  5 - 40 mg/dL Final   • LDL Chol Calc ([facility]) 06/10/2022 139 (H)  0 - 99 mg/dL Final   • LDL/HDL RATIO 06/10/2022 2.2  0.0 - 3.6 ratio Final    Comment:                                     LDL/HDL Ratio                                              Men  Women                                1/2 Avg.Risk  1.0    1.5                                    Avg.Risk  3.6    3.2                                 2X Avg.Risk  6.2    5.0                                 3X Avg.Risk  8.0    6.1     • Glucose 06/10/2022 86  65 - 99 mg/dL Final   • BUN 06/10/2022 20  8 - 27 mg/dL Final   • Creatinine 06/10/2022 1.15  0.76 - 1.27 mg/dL Final   • EGFR Result 06/10/2022 71  >59 mL/min/1.73 Final   • BUN/Creatinine Ratio 06/10/2022 17  10 - 24 Final   • Sodium 06/10/2022 136  134 - 144 mmol/L Final   • Potassium 06/10/2022 4.0  3.5 - 5.2 mmol/L Final   • Chloride 06/10/2022 97  96 -  106 mmol/L Final   • Total CO2 06/10/2022 24  20 - 29 mmol/L Final   • Calcium 06/10/2022 9.6  8.6 - 10.2 mg/dL Final   • Total Protein 06/10/2022 6.7  6.0 - 8.5 g/dL Final   • Albumin 06/10/2022 4.1  3.8 - 4.8 g/dL Final   • Globulin 06/10/2022 2.6  1.5 - 4.5 g/dL Final   • A/G Ratio 06/10/2022 1.6  1.2 - 2.2 Final   • Total Bilirubin 06/10/2022 0.2  0.0 - 1.2 mg/dL Final   • Alkaline Phosphatase 06/10/2022 68  44 - 121 IU/L Final   • AST (SGOT) 06/10/2022 17  0 - 40 IU/L Final   • ALT (SGPT) 06/10/2022 16  0 - 44 IU/L Final   • WBC 06/10/2022 5.2  3.4 - 10.8 x10E3/uL Final   • RBC 06/10/2022 3.98 (L)  4.14 - 5.80 x10E6/uL Final   • Hemoglobin 06/10/2022 13.1  13.0 - 17.7 g/dL Final   • Hematocrit 06/10/2022 38.3  37.5 - 51.0 % Final   • MCV 06/10/2022 96  79 - 97 fL Final   • MCH 06/10/2022 32.9  26.6 - 33.0 pg Final   • MCHC 06/10/2022 34.2  31.5 - 35.7 g/dL Final   • RDW 06/10/2022 12.1  11.6 - 15.4 % Final   • Platelets 06/10/2022 200  150 - 450 x10E3/uL Final   • Neutrophil Rel % 06/10/2022 67  Not Estab. % Final   • Lymphocyte Rel % 06/10/2022 18  Not Estab. % Final   • Monocyte Rel % 06/10/2022 11  Not Estab. % Final   • Eosinophil Rel % 06/10/2022 3  Not Estab. % Final   • Basophil Rel % 06/10/2022 1  Not Estab. % Final   • Neutrophils Absolute 06/10/2022 3.5  1.4 - 7.0 x10E3/uL Final   • Lymphocytes Absolute 06/10/2022 0.9  0.7 - 3.1 x10E3/uL Final   • Monocytes Absolute 06/10/2022 0.6  0.1 - 0.9 x10E3/uL Final   • Eosinophils Absolute 06/10/2022 0.2  0.0 - 0.4 x10E3/uL Final   • Basophils Absolute 06/10/2022 0.1  0.0 - 0.2 x10E3/uL Final   • Immature Granulocyte Rel % 06/10/2022 0  Not Estab. % Final   • Immature Grans Absolute 06/10/2022 0.0  0.0 - 0.1 x10E3/uL Final         Review of Systems   Constitutional: Negative.    HENT: Negative.    Respiratory: Negative.    Cardiovascular: Negative.    Gastrointestinal: Negative.    Genitourinary: Positive for dysuria.   Musculoskeletal: Negative.    Skin:  Negative.    Psychiatric/Behavioral: Negative.        Objective   Physical Exam    Procedures    Assessment & Plan   Diagnoses and all orders for this visit:    1. Painful urination (Primary)  -     POCT urinalysis dipstick, automated  -     Urine Culture - Urine, Urine, Clean Catch    2. Difficult or painful urination    3. Hypertension, benign    Other orders  -     nitrofurantoin, macrocrystal-monohydrate, (Macrobid) 100 MG capsule; Take 1 capsule by mouth 2 (Two) Times a Day.  Dispense: 20 capsule; Refill: 0  -     phenazopyridine (Pyridium) 100 MG tablet; Take 1 tablet by mouth 3 (Three) Times a Day As Needed for Bladder Spasms.  Dispense: 6 tablet; Refill: 0  -     Discontinue: lisinopril (PRINIVIL,ZESTRIL) 10 MG tablet; Take 1 tablet by mouth Daily.  Dispense: 30 tablet; Refill: 2  -     lisinopril (PRINIVIL,ZESTRIL) 20 MG tablet; Take 1 tablet by mouth Daily.  Dispense: 30 tablet; Refill: 2          Current Outpatient Medications:   •  acetaminophen (TYLENOL) 325 MG tablet, Take 2 tablets by mouth Every 4 (Four) Hours As Needed., Disp: , Rfl:   •  alendronate (FOSAMAX) 70 MG tablet, TAKE 1 TABLET BY MOUTH WEEKLY .TAKE WITH 8OZ WATER BEFORE ANY FOOD OR DRINK. DO NOT LIE DOWN FOR 30 MINUTES, Disp: 4 tablet, Rfl: 0  •  brimonidine-timolol (COMBIGAN) 0.2-0.5 % ophthalmic solution, Administer 1 drop to both eyes 2 (Two) Times a Day., Disp: , Rfl:   •  Calcium + Vitamin D3 600-10 MG-MCG tablet, TAKE 1 TABLET BY MOUTH TWICE DAILY, Disp: 60 tablet, Rfl: 0  •  chlorhexidine (PERIDEX) 0.12 % solution, , Disp: , Rfl:   •  DOCUSIL 100 MG capsule, TAKE ONE (1) CAPSULE BY MOUTH TWICE A DAY, Disp: 60 capsule, Rfl: 3  •  famotidine (PEPCID) 20 MG tablet, TAKE 1 TABLET BY MOUTH TWICE DAILY, Disp: 60 tablet, Rfl: 7  •  fenofibrate (TRICOR) 48 MG tablet, TAKE 1 TABLET BY MOUTH AT BEDTIME, Disp: 30 tablet, Rfl: 0  •  FeroSul 325 (65 Fe) MG tablet, TAKE 1 TABLET BY MOUTH TWICE DAILY, Disp: 60 tablet, Rfl: 0  •  fluticasone  (FLONASE) 50 MCG/ACT nasal spray, 2 sprays into the nostril(s) as directed by provider Daily., Disp: 1 bottle, Rfl: 1  •  GNP Allergy 4 MG tablet, TAKE 1 TABLET BY MOUTH 4 TO 6 HOURS AS NEEDED FOR ALLERGIES AND RUNNY NOSE, Disp: 24 tablet, Rfl: 0  •  GNP Antacid Regular Strength 200-200-20 MG/5ML suspension, , Disp: , Rfl:   •  GNP Milk of Magnesia 1200 MG/15ML suspension, , Disp: , Rfl:   •  hydrocortisone 2.5 % cream, , Disp: , Rfl:   •  loperamide (IMODIUM) 2 MG capsule, , Disp: , Rfl:   •  loratadine (CLARITIN) 10 MG tablet, TAKE 1 TABLET BY MOUTH EVERY DAY, Disp: 30 tablet, Rfl: 0  •  mupirocin (BACTROBAN) 2 % ointment, APPLY TO RIGHT 5TH TOE TWICE DAILY, Disp: , Rfl:   •  Neomycin-Bacitracin-Polymyxin (GNP TRIPLE ANTIBIOTIC) 3.5-400-5000 ointment, FOLLOW DIRECTIONS ON LABEL AS NEEDED TO PREVENT INFECTIONS IN MINOR CUTS AND SCRAPES. IF NO IMPROVEMENT IN 48 HOURS NOTIFY NURSE, Disp: 28.4 g, Rfl: 12  •  omeprazole (priLOSEC) 20 MG capsule, TAKE 1 CAPSULE BY MOUTH EVERY DAY AS NEEDED ( BUBBLE ), Disp: 30 capsule, Rfl: 0  •  OXcarbazepine (TRILEPTAL) 600 MG tablet, TAKE 1 TABLET BY MOUTH EVERY MORNING AND 2 TABLETS EVERY EVENING, Disp: 90 tablet, Rfl: 10  •  PHENobarbital (LUMINAL) 97.2 MG tablet, TAKE 1 TABLET BY MOUTH EVERY NIGHT AT BEDTIME * NARC SHEET*, Disp: 30 tablet, Rfl: 3  •  polyethylene glycol (MiraLax) 17 GM/SCOOP powder, Take 17 g by mouth Daily. Mix with water 17g daily with 8oz water prn, Disp: 1 each, Rfl: 2  •  Psyllium (Metamucil [facility] Fiber) 55.46 % powder, Take 1 teaspoon(s) by mouth 2 (Two) Times a Day. 1 tsp 1-2 x day as needed, Disp: 1 g, Rfl: 1  •  risperiDONE (risperDAL) 2 MG tablet, TAKE 1 TABLET BY MOUTH TWICE DAILY (INCREASED DOSE INTO MARCH PACKS), Disp: 60 tablet, Rfl: 5  •  tamsulosin (FLOMAX) 0.4 MG capsule 24 hr capsule, Take 1 capsule by mouth Daily., Disp: , Rfl:   •  tolnaftate (TINACTIN) 1 % external solution, Apply twice a day to affected area until resolved, Disp: 29.5  mL, Rfl: 2  •  lisinopril (PRINIVIL,ZESTRIL) 20 MG tablet, Take 1 tablet by mouth Daily., Disp: 30 tablet, Rfl: 2  •  nitrofurantoin, macrocrystal-monohydrate, (Macrobid) 100 MG capsule, Take 1 capsule by mouth 2 (Two) Times a Day., Disp: 20 capsule, Rfl: 0  •  phenazopyridine (Pyridium) 100 MG tablet, Take 1 tablet by mouth 3 (Three) Times a Day As Needed for Bladder Spasms., Disp: 6 tablet, Rfl: 0           HORTENCIA Valentine 11/3/2022 16:18 EDT  This note has been electronically signed

## 2022-11-05 DIAGNOSIS — G40.109 SEIZURE, TEMPORAL LOBE: ICD-10-CM

## 2022-11-06 LAB
BACTERIA UR CULT: NORMAL
BACTERIA UR CULT: NORMAL

## 2022-11-07 ENCOUNTER — TELEPHONE (OUTPATIENT)
Dept: FAMILY MEDICINE CLINIC | Facility: CLINIC | Age: 62
End: 2022-11-07

## 2022-11-07 RX ORDER — LORATADINE 10 MG/1
TABLET ORAL
Qty: 30 TABLET | Refills: 0 | Status: SHIPPED | OUTPATIENT
Start: 2022-11-07 | End: 2022-11-08

## 2022-11-07 RX ORDER — FERROUS SULFATE 325(65) MG
TABLET ORAL
Qty: 60 TABLET | Refills: 0 | Status: SHIPPED | OUTPATIENT
Start: 2022-11-07 | End: 2022-12-05

## 2022-11-07 RX ORDER — LISINOPRIL 20 MG/1
20 TABLET ORAL DAILY
Qty: 30 TABLET | Refills: 2 | Status: SHIPPED | OUTPATIENT
Start: 2022-11-07 | End: 2022-11-14

## 2022-11-07 RX ORDER — ALENDRONATE SODIUM 70 MG/1
TABLET ORAL
Qty: 4 TABLET | Refills: 0 | Status: SHIPPED | OUTPATIENT
Start: 2022-11-07 | End: 2022-12-05

## 2022-11-07 RX ORDER — PHENOBARBITAL 97.2 MG/1
TABLET ORAL
Qty: 30 TABLET | Refills: 2 | Status: SHIPPED | OUTPATIENT
Start: 2022-11-07 | End: 2023-01-30

## 2022-11-07 RX ORDER — FENOFIBRATE 48 MG/1
TABLET, COATED ORAL
Qty: 30 TABLET | Refills: 0 | Status: SHIPPED | OUTPATIENT
Start: 2022-11-07 | End: 2022-12-05

## 2022-11-08 RX ORDER — LORATADINE 10 MG/1
TABLET ORAL
Qty: 30 TABLET | Refills: 0 | Status: SHIPPED | OUTPATIENT
Start: 2022-11-08 | End: 2022-12-30

## 2022-11-09 RX ORDER — CALCIUM CARBONATE/VITAMIN D3 600 MG-10
TABLET ORAL
Qty: 60 TABLET | Refills: 0 | Status: SHIPPED | OUTPATIENT
Start: 2022-11-09 | End: 2022-12-05

## 2022-11-14 ENCOUNTER — TELEPHONE (OUTPATIENT)
Dept: FAMILY MEDICINE CLINIC | Facility: CLINIC | Age: 62
End: 2022-11-14

## 2022-11-14 RX ORDER — LISINOPRIL 10 MG/1
10 TABLET ORAL DAILY
Qty: 30 TABLET | Refills: 5 | Status: SHIPPED | OUTPATIENT
Start: 2022-11-14 | End: 2023-03-30

## 2022-11-14 RX ORDER — LISINOPRIL 20 MG/1
20 TABLET ORAL DAILY
Qty: 30 TABLET | Refills: 2 | Status: CANCELLED | OUTPATIENT
Start: 2022-11-14

## 2022-11-14 NOTE — TELEPHONE ENCOUNTER
Caller: LYNDON WALLACE    Relationship: Caregiver (non-relative)    Best call back number: [phone]    Requested Prescriptions:   Requested Prescriptions     Pending Prescriptions Disp Refills   • lisinopril (PRINIVIL,ZESTRIL) 20 MG tablet 30 tablet 2     Sig: Take 1 tablet by mouth Daily.        Pharmacy where request should be sent: [facility] [phone] [place], [place] [phone] FX     Additional details provided by patient: CAREGIVER STATES THAT THIS IS SUPPOSED TO BE 10MG PLEASE CORRECT AND RESEND    Does the patient have less than a 3 day supply:  [] Yes  [x] No    Jhonatan Orellana Rep   11/14/22 15:32 EST

## 2022-12-05 RX ORDER — CALCIUM CARBONATE/VITAMIN D3 600 MG-10
TABLET ORAL
Qty: 60 TABLET | Refills: 0 | Status: SHIPPED | OUTPATIENT
Start: 2022-12-05 | End: 2022-12-30

## 2022-12-05 RX ORDER — ALENDRONATE SODIUM 70 MG/1
TABLET ORAL
Qty: 4 TABLET | Refills: 0 | Status: SHIPPED | OUTPATIENT
Start: 2022-12-05 | End: 2022-12-30

## 2022-12-05 RX ORDER — FERROUS SULFATE 325(65) MG
TABLET ORAL
Qty: 60 TABLET | Refills: 0 | Status: SHIPPED | OUTPATIENT
Start: 2022-12-05 | End: 2022-12-30

## 2022-12-05 RX ORDER — FAMOTIDINE 20 MG/1
TABLET, FILM COATED ORAL
Qty: 60 TABLET | Refills: 6 | Status: SHIPPED | OUTPATIENT
Start: 2022-12-05

## 2022-12-05 RX ORDER — FENOFIBRATE 48 MG/1
TABLET, COATED ORAL
Qty: 30 TABLET | Refills: 0 | Status: SHIPPED | OUTPATIENT
Start: 2022-12-05 | End: 2022-12-30

## 2022-12-13 RX ORDER — ACETAMINOPHEN 325 MG/1
TABLET ORAL
Qty: 60 TABLET | Refills: 10 | Status: SHIPPED | OUTPATIENT
Start: 2022-12-13

## 2022-12-27 ENCOUNTER — OFFICE VISIT (OUTPATIENT)
Dept: FAMILY MEDICINE CLINIC | Facility: CLINIC | Age: 62
End: 2022-12-27

## 2022-12-27 VITALS
WEIGHT: 153 LBS | DIASTOLIC BLOOD PRESSURE: 63 MMHG | SYSTOLIC BLOOD PRESSURE: 99 MMHG | BODY MASS INDEX: 23.19 KG/M2 | OXYGEN SATURATION: 96 % | HEART RATE: 88 BPM | HEIGHT: 68 IN | TEMPERATURE: 97.1 F

## 2022-12-27 DIAGNOSIS — I10 HYPERTENSION, BENIGN: ICD-10-CM

## 2022-12-27 DIAGNOSIS — R79.89 SERUM CREATININE RAISED: Primary | ICD-10-CM

## 2022-12-27 DIAGNOSIS — N18.9 ANEMIA DUE TO CHRONIC KIDNEY DISEASE, UNSPECIFIED CKD STAGE: ICD-10-CM

## 2022-12-27 DIAGNOSIS — D63.1 ANEMIA DUE TO CHRONIC KIDNEY DISEASE, UNSPECIFIED CKD STAGE: ICD-10-CM

## 2022-12-27 DIAGNOSIS — E78.00 HIGH CHOLESTEROL: ICD-10-CM

## 2022-12-27 DIAGNOSIS — G40.219 PARTIAL EPILEPSY WITH IMPAIRMENT OF CONSCIOUSNESS, INTRACTABLE: ICD-10-CM

## 2022-12-27 PROCEDURE — 99213 OFFICE O/P EST LOW 20 MIN: CPT | Performed by: NURSE PRACTITIONER

## 2022-12-27 NOTE — PROGRESS NOTES
"    Manuel Gaspar is a 62 y.o. male.     History of Present Illness  62-year-old white male lives in a group home with history of MR, anxiety, schizophrenia, osteoporosis, hypertension, kidney stones, chronic anemia, neurogenic bladder, hearing loss, chronic hyponatremia, chronic UTIs and seizure disorder who comes in today for 3-month follow-up visit    Blood pressure 100/62 heart rate 88.  Patient's blood pressure appears to be pretty labile can go low when high quite easily.  I believe he needs to have manual blood pressures for accuracy and I gave them parameters for when to hold lisinopril.  Fortunately patient has never been symptomatic with blood pressures    Patient has no other issues.  His weight is down a pound at 153 he is up-to-date on immunizations I did recommend him getting the new Pfizer vaccine.  He is up-to-date on other preventative maintenance          Fasting blood work  Monitor blood pressures with parameters given  Consider new Pfizer vaccine for variant  Follow-up 3 months       The following portions of the patient's history were reviewed and updated as appropriate: allergies, current medications, past family history, past medical history, past social history, past surgical history and problem list.    Vitals:    12/27/22 0843   BP: 99/63   BP Location: Right arm   Patient Position: Sitting   Cuff Size: Adult   Pulse: 88   Temp: 97.1 °F (36.2 °C)   TempSrc: Temporal   SpO2: 96%   Weight: 69.4 kg (153 lb)   Height: 172.7 cm (68\")     Body mass index is 23.26 kg/m².    Past Medical History:   Diagnosis Date   • Hearing loss    • Hypertension    • Kidney stone    • Mental retardation    • Neurogenic bladder    • Seizure disorder (HCC)     onset 16-17 years old, last seizure about 2015 when weaning phenobarb     Past Surgical History:   Procedure Laterality Date   • BLADDER REPAIR     • CATARACT EXTRACTION     • ORIF PATELLA FRACTURE Left    • THORACOTOMY     • TYMPANOMASTOIDECTOMY       Family " History   Family history unknown: Yes     Immunization History   Administered Date(s) Administered   • COVID-19 (MODERNA) 1st, 2nd, 3rd Dose Only 01/11/2021, 02/08/2021, 10/17/2021   • Flu Vaccine Intradermal Quad 18-64YR 10/05/2018, 11/19/2021   • Flu Vaccine Quad PF >36MO 11/23/2016, 11/12/2019   • FluLaval/Fluzone >6mos 10/21/2020   • Fluzone Quad >6mos (Multi-dose) 10/06/2015   • Td 06/13/2014       Office Visit on 11/03/2022   Component Date Value Ref Range Status   • Color 11/03/2022 Yellow  Yellow, Straw, Dark Yellow, Lluvia Final   • Clarity, UA 11/03/2022 Clear  Clear Final   • Specific Gravity  11/03/2022 1.015  1.005 - 1.030 Final   • pH, Urine 11/03/2022 6.0  5.0 - 8.0 Final   • Leukocytes 11/03/2022 Negative  Negative Final   • Nitrite, UA 11/03/2022 Negative  Negative Final   • Protein, POC 11/03/2022 Negative  Negative mg/dL Final   • Glucose, UA 11/03/2022 Negative  Negative mg/dL Final   • Ketones, UA 11/03/2022 Negative  Negative Final   • Urobilinogen, UA 11/03/2022 Normal  Normal, 0.2 E.U./dL Final   • Bilirubin 11/03/2022 Negative  Negative Final   • Blood, UA 11/03/2022 Trace (A)  Negative Final   • Lot Number 11/03/2022 [id number]   Final   • Expiration Date 11/03/2022 10/5/23   Final   • Urine Culture 11/03/2022 Final report   Final   • Result 1 11/03/2022 Comment   Final    Comment: Mixed urogenital klever  Less than 10,000 colonies/mL           Review of Systems   Constitutional: Negative.    HENT: Negative.    Respiratory: Negative.    Cardiovascular: Negative.    Gastrointestinal: Negative.    Genitourinary: Negative.    Musculoskeletal: Negative.    Skin: Negative.    Neurological: Negative.    Psychiatric/Behavioral: Negative.        Objective   Physical Exam  Constitutional:       Appearance: Normal appearance.   HENT:      Head: Normocephalic.   Cardiovascular:      Rate and Rhythm: Normal rate and regular rhythm.      Pulses: Normal pulses.      Heart sounds: Normal heart sounds.    Pulmonary:      Effort: Pulmonary effort is normal.      Breath sounds: Normal breath sounds.   Abdominal:      General: Bowel sounds are normal.   Musculoskeletal:         General: Normal range of motion.   Skin:     General: Skin is warm and dry.   Neurological:      General: No focal deficit present.      Mental Status: He is alert and oriented to person, place, and time.   Psychiatric:         Mood and Affect: Mood normal.         Behavior: Behavior normal.         Procedures    Assessment & Plan   Diagnoses and all orders for this visit:    1. Serum creatinine raised (Primary)  -     Comprehensive Metabolic Panel; Future    2. Anemia due to chronic kidney disease, unspecified CKD stage  -     CBC & Differential; Future    3. Partial epilepsy with impairment of consciousness, intractable (HCC)  -     Phenobarbital Level; Future    4. High cholesterol  -     Lipid Panel With LDL / HDL Ratio; Future    5. Hypertension, benign          Current Outpatient Medications:   •  acetaminophen (TYLENOL) 325 MG tablet, TAKE 2 TABLETS BY MOUTH EVERY 4 HOURS AS NEEDED FOR FEVER AND PAIN ( MAX 12 DOSES IN 24 HOURS), Disp: 60 tablet, Rfl: 10  •  alendronate (FOSAMAX) 70 MG tablet, TAKE 1 TABLET BY MOUTH WEEKLY .TAKE WITH 8OZ WATER BEFORE ANY FOOD OR DRINK. DO NOT LIE DOWN FOR 30 MINUTES, Disp: 4 tablet, Rfl: 0  •  brimonidine-timolol (COMBIGAN) 0.2-0.5 % ophthalmic solution, Administer 1 drop to both eyes 2 (Two) Times a Day., Disp: , Rfl:   •  Calcium + Vitamin D3 600-10 MG-MCG tablet, TAKE 1 TABLET BY MOUTH TWICE DAILY, Disp: 60 tablet, Rfl: 0  •  chlorhexidine (PERIDEX) 0.12 % solution, , Disp: , Rfl:   •  DOCUSIL 100 MG capsule, TAKE ONE (1) CAPSULE BY MOUTH TWICE A DAY, Disp: 60 capsule, Rfl: 3  •  famotidine (PEPCID) 20 MG tablet, TAKE 1 TABLET BY MOUTH TWICE DAILY, Disp: 60 tablet, Rfl: 6  •  fenofibrate (TRICOR) 48 MG tablet, TAKE 1 TABLET BY MOUTH AT BEDTIME, Disp: 30 tablet, Rfl: 0  •  FeroSul 325 (65 Fe) MG tablet,  TAKE 1 TABLET BY MOUTH TWICE DAILY, Disp: 60 tablet, Rfl: 0  •  fluticasone (FLONASE) 50 MCG/ACT nasal spray, 2 sprays into the nostril(s) as directed by provider Daily., Disp: 1 bottle, Rfl: 1  •  GNP Allergy 4 MG tablet, TAKE 1 TABLET BY MOUTH 4 TO 6 HOURS AS NEEDED FOR ALLERGIES AND RUNNY NOSE, Disp: 24 tablet, Rfl: 0  •  GNP Antacid Regular Strength 200-200-20 MG/5ML suspension, , Disp: , Rfl:   •  GNP Milk of Magnesia 1200 MG/15ML suspension, , Disp: , Rfl:   •  hydrocortisone 2.5 % cream, , Disp: , Rfl:   •  lisinopril (PRINIVIL,ZESTRIL) 10 MG tablet, Take 1 tablet by mouth Daily., Disp: 30 tablet, Rfl: 5  •  loperamide (IMODIUM) 2 MG capsule, , Disp: , Rfl:   •  loratadine (CLARITIN) 10 MG tablet, TAKE 1 TABLET BY MOUTH EVERY DAY, Disp: 30 tablet, Rfl: 0  •  mupirocin (BACTROBAN) 2 % ointment, APPLY TO RIGHT 5TH TOE TWICE DAILY, Disp: , Rfl:   •  Neomycin-Bacitracin-Polymyxin (GNP TRIPLE ANTIBIOTIC) 3.5-400-5000 ointment, FOLLOW DIRECTIONS ON LABEL AS NEEDED TO PREVENT INFECTIONS IN MINOR CUTS AND SCRAPES. IF NO IMPROVEMENT IN 48 HOURS NOTIFY NURSE, Disp: 28.4 g, Rfl: 12  •  nitrofurantoin, macrocrystal-monohydrate, (Macrobid) 100 MG capsule, Take 1 capsule by mouth 2 (Two) Times a Day., Disp: 20 capsule, Rfl: 0  •  omeprazole (priLOSEC) 20 MG capsule, TAKE 1 CAPSULE BY MOUTH EVERY DAY AS NEEDED ( BUBBLE ), Disp: 30 capsule, Rfl: 0  •  OXcarbazepine (TRILEPTAL) 600 MG tablet, TAKE 1 TABLET BY MOUTH EVERY MORNING AND 2 TABLETS EVERY EVENING, Disp: 90 tablet, Rfl: 10  •  phenazopyridine (Pyridium) 100 MG tablet, Take 1 tablet by mouth 3 (Three) Times a Day As Needed for Bladder Spasms., Disp: 6 tablet, Rfl: 0  •  PHENobarbital (LUMINAL) 97.2 MG tablet, TAKE 1 TABLET BY MOUTH EVERY NIGHT AT BEDTIME * NARC SHEET*, Disp: 30 tablet, Rfl: 2  •  polyethylene glycol (MiraLax) 17 GM/SCOOP powder, Take 17 g by mouth Daily. Mix with water 17g daily with 8oz water prn, Disp: 1 each, Rfl: 2  •  Psyllium (Metamucil  [facility] Fiber) 55.46 % powder, Take 1 teaspoon(s) by mouth 2 (Two) Times a Day. 1 tsp 1-2 x day as needed, Disp: 1 g, Rfl: 1  •  risperiDONE (risperDAL) 2 MG tablet, TAKE 1 TABLET BY MOUTH TWICE DAILY (INCREASED DOSE INTO MARCH PACKS), Disp: 60 tablet, Rfl: 5  •  tamsulosin (FLOMAX) 0.4 MG capsule 24 hr capsule, Take 1 capsule by mouth Daily., Disp: , Rfl:   •  tolnaftate (TINACTIN) 1 % external solution, Apply twice a day to affected area until resolved, Disp: 29.5 mL, Rfl: 2           HORTENCIA Valentine 12/27/2022 09:26 EST  This note has been electronically signed

## 2022-12-27 NOTE — PATIENT INSTRUCTIONS
Fasting blood work  Monitor blood pressures with parameters given  Consider new Pfizer vaccine for variant  Follow-up 3 months

## 2022-12-30 RX ORDER — OXCARBAZEPINE 600 MG/1
TABLET, FILM COATED ORAL
Qty: 90 TABLET | Refills: 9 | Status: SHIPPED | OUTPATIENT
Start: 2022-12-30

## 2022-12-30 RX ORDER — CALCIUM CARBONATE/VITAMIN D3 600 MG-10
TABLET ORAL
Qty: 60 TABLET | Refills: 0 | Status: SHIPPED | OUTPATIENT
Start: 2022-12-30 | End: 2023-01-30

## 2022-12-30 RX ORDER — LORATADINE 10 MG/1
TABLET ORAL
Qty: 30 TABLET | Refills: 0 | Status: SHIPPED | OUTPATIENT
Start: 2022-12-30 | End: 2023-01-30

## 2022-12-30 RX ORDER — FENOFIBRATE 48 MG/1
TABLET, COATED ORAL
Qty: 30 TABLET | Refills: 0 | Status: SHIPPED | OUTPATIENT
Start: 2022-12-30 | End: 2023-01-30

## 2022-12-30 RX ORDER — ALENDRONATE SODIUM 70 MG/1
TABLET ORAL
Qty: 4 TABLET | Refills: 0 | Status: SHIPPED | OUTPATIENT
Start: 2022-12-30 | End: 2023-01-30

## 2022-12-30 RX ORDER — FERROUS SULFATE 325(65) MG
TABLET ORAL
Qty: 60 TABLET | Refills: 0 | Status: SHIPPED | OUTPATIENT
Start: 2022-12-30 | End: 2023-01-30

## 2023-01-28 DIAGNOSIS — G40.109 SEIZURE, TEMPORAL LOBE: ICD-10-CM

## 2023-01-28 DIAGNOSIS — F20.0 PARANOID SCHIZOPHRENIA: ICD-10-CM

## 2023-01-30 RX ORDER — PHENOBARBITAL 97.2 MG/1
TABLET ORAL
Qty: 30 TABLET | Refills: 1 | Status: SHIPPED | OUTPATIENT
Start: 2023-01-30 | End: 2023-03-24

## 2023-01-30 RX ORDER — FENOFIBRATE 48 MG/1
TABLET, COATED ORAL
Qty: 30 TABLET | Refills: 0 | Status: SHIPPED | OUTPATIENT
Start: 2023-01-30 | End: 2023-02-27

## 2023-01-30 RX ORDER — CALCIUM CARBONATE/VITAMIN D3 600 MG-10
TABLET ORAL
Qty: 60 TABLET | Refills: 0 | Status: SHIPPED | OUTPATIENT
Start: 2023-01-30 | End: 2023-02-27

## 2023-01-30 RX ORDER — LORATADINE 10 MG/1
TABLET ORAL
Qty: 30 TABLET | Refills: 0 | Status: SHIPPED | OUTPATIENT
Start: 2023-01-30 | End: 2023-02-27

## 2023-01-30 RX ORDER — FERROUS SULFATE 325(65) MG
TABLET ORAL
Qty: 60 TABLET | Refills: 0 | Status: SHIPPED | OUTPATIENT
Start: 2023-01-30 | End: 2023-02-27

## 2023-01-30 RX ORDER — RISPERIDONE 2 MG/1
TABLET ORAL
Qty: 60 TABLET | Refills: 4 | Status: SHIPPED | OUTPATIENT
Start: 2023-01-30

## 2023-01-30 RX ORDER — ALENDRONATE SODIUM 70 MG/1
TABLET ORAL
Qty: 4 TABLET | Refills: 0 | Status: SHIPPED | OUTPATIENT
Start: 2023-01-30 | End: 2023-02-27

## 2023-02-27 ENCOUNTER — TELEPHONE (OUTPATIENT)
Dept: FAMILY MEDICINE CLINIC | Facility: CLINIC | Age: 63
End: 2023-02-27
Payer: MEDICARE

## 2023-02-27 RX ORDER — ALENDRONATE SODIUM 70 MG/1
TABLET ORAL
Qty: 4 TABLET | Refills: 0 | Status: SHIPPED | OUTPATIENT
Start: 2023-02-27 | End: 2023-03-26

## 2023-02-27 RX ORDER — FENOFIBRATE 48 MG/1
TABLET, COATED ORAL
Qty: 30 TABLET | Refills: 0 | Status: SHIPPED | OUTPATIENT
Start: 2023-02-27 | End: 2023-03-26

## 2023-02-27 RX ORDER — FERROUS SULFATE 325(65) MG
TABLET ORAL
Qty: 60 TABLET | Refills: 0 | Status: SHIPPED | OUTPATIENT
Start: 2023-02-27 | End: 2023-03-26

## 2023-02-27 RX ORDER — LORATADINE 10 MG/1
TABLET ORAL
Qty: 30 TABLET | Refills: 0 | Status: SHIPPED | OUTPATIENT
Start: 2023-02-27 | End: 2023-03-26

## 2023-02-27 RX ORDER — CALCIUM CARBONATE/VITAMIN D3 600 MG-10
TABLET ORAL
Qty: 60 TABLET | Refills: 0 | Status: SHIPPED | OUTPATIENT
Start: 2023-02-27 | End: 2023-03-26

## 2023-02-27 NOTE — TELEPHONE ENCOUNTER
Caller: SHANDRA    Relationship: [employer]    Best call back number: [phone]    What is the best time to reach you: 8AM-3PM    Who are you requesting to speak with (clinical staff, provider,  specific staff member): CLINICAL STAFF    What was the call regarding: SHANDRA IS REQUESTING A CALL BACK TO BE ADVISED ON WHETHER THERE IS A PERIMETER BLOOD PRESSURE READING THAT SHOULD BE FOLLOWED FOR THE PATIENT'S MEDICATION lisinopril (PRINIVIL,ZESTRIL) 10 MG tablet     Do you require a callback: YES

## 2023-02-27 NOTE — TELEPHONE ENCOUNTER
Spoke with Cecile and she is wanting to know an exact # of blood pressure to not give medications.  Like if his BP is lower than 110/60 do not give Lisinopril.  SG

## 2023-03-07 ENCOUNTER — TELEPHONE (OUTPATIENT)
Dept: FAMILY MEDICINE CLINIC | Facility: CLINIC | Age: 63
End: 2023-03-07

## 2023-03-07 NOTE — TELEPHONE ENCOUNTER
Caller: SHANDRA [facility]HOU[facility]    Relationship: Other    Best call back number: [phone]    What medication are you requesting: COVID TESTS    If a prescription is needed, what is your preferred pharmacy and phone number: [facility] - [place], [place] - [address] [place] [address] [phone] [facility] [phone] FX     Additional notes: PLEASE SEND IN AN ORDER TO THE PHARMACY SO PATIENT CAN  COVID TESTS. PLEASE ADVISE WHEN SENT.

## 2023-03-08 ENCOUNTER — TELEMEDICINE (OUTPATIENT)
Dept: PSYCHIATRY | Facility: CLINIC | Age: 63
End: 2023-03-08
Payer: MEDICARE

## 2023-03-08 DIAGNOSIS — F79 INTELLECTUAL DISABILITY: Chronic | ICD-10-CM

## 2023-03-08 DIAGNOSIS — F41.1 GENERALIZED ANXIETY DISORDER: Chronic | ICD-10-CM

## 2023-03-08 DIAGNOSIS — F20.0 PARANOID SCHIZOPHRENIA: Primary | Chronic | ICD-10-CM

## 2023-03-08 DIAGNOSIS — F34.1 DYSTHYMIA: ICD-10-CM

## 2023-03-08 PROCEDURE — 99214 OFFICE O/P EST MOD 30 MIN: CPT | Performed by: PHYSICIAN ASSISTANT

## 2023-03-08 NOTE — PROGRESS NOTES
Rosa Maria Gaspar is a 62 y.o.white male with MR who [facility].    This provider is located in [place], [place] using a secure [id number] Video Visit through [facility]. Patient is being seen remotely via telehealth at their home address in [place], and stated they are in a secure environment for this session. The patient's condition being diagnosed/treated is appropriate for telemedicine. The provider identified herself as well as her credentials.   The patient, and/or patients guardian, consent to be seen remotely, and when consent is given they understand that the consent allows for patient identifiable information to be sent to a third party as needed.   They may refuse to be seen remotely at any time. The electronic data is encrypted and password protected, and the patient and/or guardian has been advised of the potential risks to privacy not withstanding such measures.   PT Identifiers used: Name and .    You have chosen to receive care through a telehealth visit.  Do you consent to use a video/audio connection for your medical care today? Yes      Chief Complaint:  Schizophrenia, Anxiety    History of Present Illness:   He is still working at the work shop 4 days a week.  His  is with him, he remains stable on Risperdal 2 mg with no behavioral issues.      Dang vaca, PCP, did labs in 2023, including CMP and lipids  He is still on Trileptal and Phenobarb for seizures.  Enjoys playing [employer]  Picky eater   No AVH, no paranoid symptoms  Denies anxiety or depression  No agitation or aggression since he went back up on Risperdal, some paranoia at lower dosage  No SI/HI  Medications are fine, no need for changes.   Sleeps well, grumpy at times    The following portions of the patient's history were reviewed and updated as appropriate: allergies, current medications, past family history, past medical history, past social history, past surgical history  and problem list.    PAST PSYCHIATRIC HISTORY  Axis I  Schizophrenia/cognitive disorder  Axis II  Learning Disorder    PAST OUTPATIENT TREATMENT  Diagnosis treated:  Cognitive Disorder  Treatment Type:  Medication Management  Prior Psychiatric Medications:  Risperdal  Trileptal  Support Groups:  None  Sequelae Of Mental Disorder:  emotional distress      Interval History  No Change    Side Effects  None    Past Psych History was reviewed and compared to 10/4/22 visit and no updates were needed.      Past Medical History:  Past Medical History:   Diagnosis Date   • Hearing loss    • Hypertension    • Kidney stone    • Mental retardation    • Neurogenic bladder    • Seizure disorder (HCC)     onset 16-17 years old, last seizure about 2015 when weaning phenobarb       Social History:  Social History     Socioeconomic History   • Marital status: Single   Tobacco Use   • Smoking status: Never   • Smokeless tobacco: Never   Vaping Use   • Vaping Use: Never used   Substance and Sexual Activity   • Alcohol use: No   • Drug use: No   • Sexual activity: Never       Family History:  Family History   Family history unknown: Yes       Past Surgical History:  Past Surgical History:   Procedure Laterality Date   • BLADDER REPAIR     • CATARACT EXTRACTION     • ORIF PATELLA FRACTURE Left    • THORACOTOMY     • TYMPANOMASTOIDECTOMY         Problem List:  Patient Active Problem List   Diagnosis   • Allergy status to unspecified drugs, medicaments and biological substances status   • Anemia   • Constipation   • Deafness   • Difficult or painful urination   • Dysthymia   • Encounter for lipid screening for cardiovascular disease   • Encounter for screening   • Encounter for screening for malignant neoplasm of prostate   • Encounter for immunization   • Therapeutic drug monitoring   • Encounter for immunization   • Encounter for general adult medical examination without abnormal findings   • Enlarged prostate without lower urinary tract  symptoms (luts)   • Generalized anxiety disorder   • High cholesterol   • Hypertension, benign   • Hyponatremia   • Knee pain   • Intellectual disability   • Nephrolithiasis   • Otalgia   • Otitis media   • Partial epilepsy with impairment of consciousness, intractable (HCC)   • Peptic ulcer disease   • Schizophrenia (HCC)   • Seizure disorder (HCC)   • Serum creatinine raised   • Weight loss, non-intentional   • Positive hepatitis C antibody test   • BMI 23.0-23.9, adult   • Other osteoporosis without current pathological fracture   • Urethral stricture due to infection   • Urinary frequency       Allergy:   Allergies   Allergen Reactions   • Levofloxacin Other (See Comments)   • Sulfamethoxazole-Trimethoprim Other (See Comments)        Discontinued Medications:  There are no discontinued medications.    Current Medications:   Current Outpatient Medications   Medication Sig Dispense Refill   • acetaminophen (TYLENOL) 325 MG tablet TAKE 2 TABLETS BY MOUTH EVERY 4 HOURS AS NEEDED FOR FEVER AND PAIN ( MAX 12 DOSES IN 24 HOURS) 60 tablet 10   • alendronate (FOSAMAX) 70 MG tablet TAKE 1 TABLET BY MOUTH WEEKLY .TAKE WITH 8OZ WATER BEFORE ANY FOOD OR DRINK. DO NOT LIE DOWN FOR 30 MINUTES 4 tablet 0   • brimonidine-timolol (COMBIGAN) 0.2-0.5 % ophthalmic solution Administer 1 drop to both eyes 2 (Two) Times a Day.     • Calcium + Vitamin D3 600-10 MG-MCG tablet TAKE 1 TABLET BY MOUTH TWICE DAILY 60 tablet 0   • chlorhexidine (PERIDEX) 0.12 % solution      • COVID-19 Home Collection Test kit 1 each by In Vitro route Daily As Needed (covid 19 symptoms). 3 kit 3   • DOCUSIL 100 MG capsule TAKE ONE (1) CAPSULE BY MOUTH TWICE A DAY 60 capsule 3   • famotidine (PEPCID) 20 MG tablet TAKE 1 TABLET BY MOUTH TWICE DAILY 60 tablet 6   • fenofibrate (TRICOR) 48 MG tablet TAKE 1 TABLET BY MOUTH AT BEDTIME 30 tablet 0   • FeroSul 325 (65 Fe) MG tablet TAKE 1 TABLET BY MOUTH TWICE DAILY 60 tablet 0   • fluticasone (FLONASE) 50 MCG/ACT  nasal spray 2 sprays into the nostril(s) as directed by provider Daily. 1 bottle 1   • GNP Allergy 4 MG tablet TAKE 1 TABLET BY MOUTH 4 TO 6 HOURS AS NEEDED FOR ALLERGIES AND RUNNY NOSE 24 tablet 0   • GNP Antacid Regular Strength 200-200-20 MG/5ML suspension      • GNP Milk of Magnesia 1200 MG/15ML suspension      • hydrocortisone 2.5 % cream      • lisinopril (PRINIVIL,ZESTRIL) 10 MG tablet Take 1 tablet by mouth Daily. 30 tablet 5   • loperamide (IMODIUM) 2 MG capsule      • loratadine (CLARITIN) 10 MG tablet TAKE 1 TABLET BY MOUTH EVERY DAY 30 tablet 0   • mupirocin (BACTROBAN) 2 % ointment APPLY TO RIGHT 5TH TOE TWICE DAILY     • Neomycin-Bacitracin-Polymyxin (GNP TRIPLE ANTIBIOTIC) 3.5-400-5000 ointment FOLLOW DIRECTIONS ON LABEL AS NEEDED TO PREVENT INFECTIONS IN MINOR CUTS AND SCRAPES. IF NO IMPROVEMENT IN 48 HOURS NOTIFY NURSE 28.4 g 12   • nitrofurantoin, macrocrystal-monohydrate, (Macrobid) 100 MG capsule Take 1 capsule by mouth 2 (Two) Times a Day. 20 capsule 0   • omeprazole (priLOSEC) 20 MG capsule TAKE 1 CAPSULE BY MOUTH EVERY DAY AS NEEDED ( BUBBLE ) 30 capsule 0   • OXcarbazepine (TRILEPTAL) 600 MG tablet TAKE 1 TABLET BY MOUTH EVERY MORNING AND 2 TABLETS EVERY EVENING 90 tablet 9   • phenazopyridine (Pyridium) 100 MG tablet Take 1 tablet by mouth 3 (Three) Times a Day As Needed for Bladder Spasms. 6 tablet 0   • PHENobarbital (LUMINAL) 97.2 MG tablet TAKE 1 TABLET BY MOUTH EVERY NIGHT AT BEDTIME * NARC SHEET* 30 tablet 1   • polyethylene glycol (MiraLax) 17 GM/SCOOP powder Take 17 g by mouth Daily. Mix with water 17g daily with 8oz water prn 1 each 2   • Psyllium (Metamucil [facility] Fiber) 55.46 % powder Take 1 teaspoon(s) by mouth 2 (Two) Times a Day. 1 tsp 1-2 x day as needed 1 g 1   • risperiDONE (risperDAL) 2 MG tablet TAKE 1 TABLET BY MOUTH TWICE DAILY 60 tablet 4   • tamsulosin (FLOMAX) 0.4 MG capsule 24 hr capsule Take 1 capsule by mouth Daily.     • tolnaftate (TINACTIN) 1 % external  solution Apply twice a day to affected area until resolved 29.5 mL 2     No current facility-administered medications for this visit.         Review of Symptoms:    Psychiatric/Behavioral: Negative for agitation, behavioral problems, confusion, decreased concentration, dysphoric mood, hallucinations, self-injury, sleep disturbance and suicidal ideas. The patient is not nervous/anxious and is not hyperactive.        Physical Exam:   There were no vitals taken for this visit.    Mental Status Exam:   Hygiene:   Good  Cooperation:  Cooperative  Eye Contact:  Good  Psychomotor Behavior:  Slow  Affect:  Appropriate  Mood: normal  Hopelessness: Denies  Speech:  Normal  Thought Process:  Goal directed  Thought Content:  Normal  Suicidal:  None  Homicidal:  None  Hallucinations:  None  Delusion:  None  Memory:  Deficits  Orientation:  Person, Place, Time and Situation  Reliability:  fair  Insight:  Fair  Judgement:  Fair  Impulse Control:  Good  Physical/Medical Issues:  No      Mental Status Exam was reviewed and compared to 10/4/22 visit and no updates were needed, the exam was the same.        PHQ-9 Depression Screening  Little interest or pleasure in doing things? 0-->not at all   Feeling down, depressed, or hopeless? 0-->not at all   Trouble falling or staying asleep, or sleeping too much?     Feeling tired or having little energy?     Poor appetite or overeating?     Feeling bad about yourself - or that you are a failure or have let yourself or your family down?     Trouble concentrating on things, such as reading the newspaper or watching television?     Moving or speaking so slowly that other people could have noticed? Or the opposite - being so fidgety or restless that you have been moving around a lot more than usual?     Thoughts that you would be better off dead, or of hurting yourself in some way?     PHQ-9 Total Score 0   If you checked off any problems, how difficult have these problems made it for you to do  your work, take care of things at home, or get along with other people?             Never smoker    I advised Ray of the risks of tobacco use.     Lab Results:   Results Encounter on 01/01/2023   Component Date Value Ref Range Status   • WBC 01/11/2023 6.2  3.4 - 10.8 x10E3/uL Final   • RBC 01/11/2023 3.59 (L)  4.14 - 5.80 x10E6/uL Final   • Hemoglobin 01/11/2023 11.7 (L)  13.0 - 17.7 g/dL Final   • Hematocrit 01/11/2023 34.3 (L)  37.5 - 51.0 % Final   • MCV 01/11/2023 96  79 - 97 fL Final   • MCH 01/11/2023 32.6  26.6 - 33.0 pg Final   • MCHC 01/11/2023 34.1  31.5 - 35.7 g/dL Final   • RDW 01/11/2023 12.4  11.6 - 15.4 % Final   • Platelets 01/11/2023 225  150 - 450 x10E3/uL Final   • Neutrophil Rel % 01/11/2023 70  Not Estab. % Final   • Lymphocyte Rel % 01/11/2023 16  Not Estab. % Final   • Monocyte Rel % 01/11/2023 10  Not Estab. % Final   • Eosinophil Rel % 01/11/2023 3  Not Estab. % Final   • Basophil Rel % 01/11/2023 1  Not Estab. % Final   • Neutrophils Absolute 01/11/2023 4.4  1.4 - 7.0 x10E3/uL Final   • Lymphocytes Absolute 01/11/2023 1.0  0.7 - 3.1 x10E3/uL Final   • Monocytes Absolute 01/11/2023 0.6  0.1 - 0.9 x10E3/uL Final   • Eosinophils Absolute 01/11/2023 0.2  0.0 - 0.4 x10E3/uL Final   • Basophils Absolute 01/11/2023 0.1  0.0 - 0.2 x10E3/uL Final   • Immature Granulocyte Rel % 01/11/2023 0  Not Estab. % Final   • Immature Grans Absolute 01/11/2023 0.0  0.0 - 0.1 x10E3/uL Final   • Glucose 01/11/2023 93  70 - 99 mg/dL Final   • BUN 01/11/2023 25  8 - 27 mg/dL Final   • Creatinine 01/11/2023 1.21  0.76 - 1.27 mg/dL Final   • EGFR Result 01/11/2023 67  >59 mL/min/1.73 Final   • BUN/Creatinine Ratio 01/11/2023 21  10 - 24 Final   • Sodium 01/11/2023 134  134 - 144 mmol/L Final   • Potassium 01/11/2023 4.4  3.5 - 5.2 mmol/L Final   • Chloride 01/11/2023 98  96 - 106 mmol/L Final   • Total CO2 01/11/2023 24  20 - 29 mmol/L Final   • Calcium 01/11/2023 9.5  8.6 - 10.2 mg/dL Final   • Total Protein  01/11/2023 6.9  6.0 - 8.5 g/dL Final   • Albumin 01/11/2023 4.1  3.8 - 4.8 g/dL Final   • Globulin 01/11/2023 2.8  1.5 - 4.5 g/dL Final   • A/G Ratio 01/11/2023 1.5  1.2 - 2.2 Final   • Total Bilirubin 01/11/2023 0.2  0.0 - 1.2 mg/dL Final   • Alkaline Phosphatase 01/11/2023 65  44 - 121 IU/L Final   • AST (SGOT) 01/11/2023 14  0 - 40 IU/L Final   • ALT (SGPT) 01/11/2023 12  0 - 44 IU/L Final   • Total Cholesterol 01/11/2023 209 (H)  100 - 199 mg/dL Final   • Triglycerides 01/11/2023 107  0 - 149 mg/dL Final   • HDL Cholesterol 01/11/2023 63  >39 mg/dL Final   • VLDL Cholesterol Damon 01/11/2023 19  5 - 40 mg/dL Final   • LDL Chol Calc (NIH) 01/11/2023 127 (H)  0 - 99 mg/dL Final   • LDL/HDL RATIO 01/11/2023 2.0  0.0 - 3.6 ratio Final    Comment:                                     LDL/HDL Ratio                                              Men  Women                                1/2 Avg.Risk  1.0    1.5                                    Avg.Risk  3.6    3.2                                 2X Avg.Risk  6.2    5.0                                 3X Avg.Risk  8.0    6.1     • Phenobarbital 01/11/2023 22  15 - 40 ug/mL Final                                    Detection Limit = 3       Assessment & Plan   Problems Addressed this Visit        Mental Health    Generalized anxiety disorder (Chronic)    Schizophrenia (HCC) - Primary (Chronic)    Dysthymia       Neuro    Intellectual disability (Chronic)   Diagnoses       Codes Comments    Paranoid schizophrenia (HCC)    -  Primary ICD-10-CM: F20.0  ICD-9-CM: 295.30     Generalized anxiety disorder     ICD-10-CM: F41.1  ICD-9-CM: 300.02     Dysthymia     ICD-10-CM: F34.1  ICD-9-CM: 300.4     Intellectual disability     ICD-10-CM: F79  ICD-9-CM: 319           Visit Diagnoses:    ICD-10-CM ICD-9-CM   1. Paranoid schizophrenia (HCC)  F20.0 295.30   2. Generalized anxiety disorder  F41.1 300.02   3. Dysthymia  F34.1 300.4   4. Intellectual disability  F79 319       TREATMENT  PLAN/GOALS: Continue supportive psychotherapy efforts and medications as indicated. Treatment and medication options discussed during today's visit. Patient ackowledged and verbally consented to continue with current treatment plan and was educated on the importance of compliance with treatment and follow-up appointments.    MEDICATION ISSUES:  INSPECT reviewed as expected  Discussed medication options and treatment plan of prescribed medication as well as the risks, benefits, and side effects including potential falls, possible impaired driving and metabolic adversities among others. Patient is agreeable to call the office with any worsening of symptoms or onset of side effects. Patient is agreeable to call 911 or go to the nearest ER should he/she begin having SI/HI. No medication side effects or related complaints today.     Patient continues to do well on Risperdal, did not do well with dosage reduction and back up to 2 mg tabs.    Continue Risperdal 2 mg BID   Continue Trileptal prescribed by neurology for seizures  Bloodwork done by Dang YARBROUGH ORDERED DURING VISIT:  No orders of the defined types were placed in this encounter.      Return in about 6 months (around 9/8/2023) for video visit.      This document has been electronically signed by Krista Epstein PA-C  March 8, 2023 13:33 EST

## 2023-03-24 DIAGNOSIS — G40.109 SEIZURE, TEMPORAL LOBE: ICD-10-CM

## 2023-03-24 RX ORDER — PHENOBARBITAL 97.2 MG/1
TABLET ORAL
Qty: 30 TABLET | Refills: 5 | Status: SHIPPED | OUTPATIENT
Start: 2023-03-24

## 2023-03-26 RX ORDER — LORATADINE 10 MG/1
TABLET ORAL
Qty: 30 TABLET | Refills: 0 | Status: SHIPPED | OUTPATIENT
Start: 2023-03-26

## 2023-03-26 RX ORDER — FENOFIBRATE 48 MG/1
TABLET, COATED ORAL
Qty: 30 TABLET | Refills: 0 | Status: SHIPPED | OUTPATIENT
Start: 2023-03-26

## 2023-03-26 RX ORDER — ALENDRONATE SODIUM 70 MG/1
TABLET ORAL
Qty: 4 TABLET | Refills: 0 | Status: SHIPPED | OUTPATIENT
Start: 2023-03-26

## 2023-03-26 RX ORDER — FERROUS SULFATE 325(65) MG
TABLET ORAL
Qty: 60 TABLET | Refills: 0 | Status: SHIPPED | OUTPATIENT
Start: 2023-03-26

## 2023-03-26 RX ORDER — CALCIUM CARBONATE/VITAMIN D3 600 MG-10
TABLET ORAL
Qty: 60 TABLET | Refills: 0 | Status: SHIPPED | OUTPATIENT
Start: 2023-03-26

## 2023-03-30 ENCOUNTER — OFFICE VISIT (OUTPATIENT)
Dept: FAMILY MEDICINE CLINIC | Facility: CLINIC | Age: 63
End: 2023-03-30
Payer: MEDICARE

## 2023-03-30 VITALS
OXYGEN SATURATION: 97 % | TEMPERATURE: 97.5 F | HEIGHT: 68 IN | SYSTOLIC BLOOD PRESSURE: 108 MMHG | HEART RATE: 86 BPM | WEIGHT: 156.6 LBS | BODY MASS INDEX: 23.73 KG/M2 | DIASTOLIC BLOOD PRESSURE: 60 MMHG

## 2023-03-30 DIAGNOSIS — L85.3 DRY SKIN: ICD-10-CM

## 2023-03-30 DIAGNOSIS — I10 HYPERTENSION, BENIGN: ICD-10-CM

## 2023-03-30 DIAGNOSIS — N18.9 ANEMIA DUE TO CHRONIC KIDNEY DISEASE, UNSPECIFIED CKD STAGE: Primary | ICD-10-CM

## 2023-03-30 DIAGNOSIS — D63.1 ANEMIA DUE TO CHRONIC KIDNEY DISEASE, UNSPECIFIED CKD STAGE: Primary | ICD-10-CM

## 2023-03-30 RX ORDER — LISINOPRIL 10 MG/1
TABLET ORAL
Qty: 30 TABLET | Refills: 5 | Status: SHIPPED | OUTPATIENT
Start: 2023-03-30

## 2023-03-30 RX ORDER — COLLOIDAL OATMEAL 1 %
CREAM (GRAM) TOPICAL
Qty: 354 EACH | Refills: 5 | Status: SHIPPED | OUTPATIENT
Start: 2023-03-30

## 2023-03-30 RX ORDER — PETROLATUM,WHITE
1 OINTMENT IN PACKET (GRAM) TOPICAL AS NEEDED
Qty: 212 G | Refills: 2 | Status: SHIPPED | OUTPATIENT
Start: 2023-03-30

## 2023-03-30 NOTE — PROGRESS NOTES
"    Manuel Gaspar is a 62 y.o. male.     History of Present Illness  62-year-old white male lives in a group home with history of MR, anxiety, schizophrenia, osteoporosis, hypertension, kidney stones, chronic anemia, neurogenic bladder, hearing loss, chronic hyponatremia, chronic UTIs and seizure disorder who comes in today for 3-month follow-up visit    Blood pressure 108/60 heart rate 86 with moderate systolic murmur he denies any chest pain, dyspnea, tachycardia or dizziness.  Caregiver states that often times he does not get his blood pressure medication in the morning due to his blood pressure being on the low side.  He is only on 10 mg daily.  We will make his 10 mg as needed and gave the facility parameters for which to follow for giving lisinopril and see how that works for the next 3 months    Caregiver states patient's has a lot of dry skin especially on his hands.  I ordered some renal eczema lotion to be used 2-3 times a day also encouraged them to put heavy dose of Vaseline on hands and cover with cotton gloves at bedtime    Patient's hemoglobin 11.7 last visit we will check CBC today    Weight is up 4 pounds at 157 with a BMI of 23.8 and is up-to-date on all other preventative maintenance through facility              CBC  Lisinopril 10 mg as needed as needed per blood pressure parameters  Vaseline and cotton gloves at bedtime  Follow-up 3 months       The following portions of the patient's history were reviewed and updated as appropriate: allergies, current medications, past family history, past medical history, past social history, past surgical history and problem list.    Vitals:    03/30/23 0838   BP: 108/60   BP Location: Left arm   Patient Position: Sitting   Cuff Size: Adult   Pulse: 86   Temp: 97.5 °F (36.4 °C)   TempSrc: Oral   SpO2: 97%   Weight: 71 kg (156 lb 9.6 oz)   Height: 172.7 cm (67.99\")     Body mass index is 23.82 kg/m².    Past Medical History:   Diagnosis Date   • Hearing loss    • " Hypertension    • Kidney stone    • Mental retardation    • Neurogenic bladder    • Seizure disorder (HCC)     onset 16-17 years old, last seizure about 2015 when weaning phenobarb     Past Surgical History:   Procedure Laterality Date   • BLADDER REPAIR     • CATARACT EXTRACTION     • ORIF PATELLA FRACTURE Left    • THORACOTOMY     • TYMPANOMASTOIDECTOMY       Family History   Family history unknown: Yes     Immunization History   Administered Date(s) Administered   • COVID-19 (MODERNA) 1st, 2nd, 3rd Dose Only 01/11/2021, 02/08/2021, 10/17/2021   • Flu Vaccine Intradermal Quad 18-64YR 10/05/2018, 11/19/2021   • Flu Vaccine Quad PF >36MO 11/23/2016, 11/12/2019   • FluLaval/Fluzone >6mos 10/21/2020   • Fluzone Quad >6mos (Multi-dose) 10/06/2015   • Td 06/13/2014       Results Encounter on 01/01/2023   Component Date Value Ref Range Status   • WBC 01/11/2023 6.2  3.4 - 10.8 x10E3/uL Final   • RBC 01/11/2023 3.59 (L)  4.14 - 5.80 x10E6/uL Final   • Hemoglobin 01/11/2023 11.7 (L)  13.0 - 17.7 g/dL Final   • Hematocrit 01/11/2023 34.3 (L)  37.5 - 51.0 % Final   • MCV 01/11/2023 96  79 - 97 fL Final   • MCH 01/11/2023 32.6  26.6 - 33.0 pg Final   • MCHC 01/11/2023 34.1  31.5 - 35.7 g/dL Final   • RDW 01/11/2023 12.4  11.6 - 15.4 % Final   • Platelets 01/11/2023 225  150 - 450 x10E3/uL Final   • Neutrophil Rel % 01/11/2023 70  Not Estab. % Final   • Lymphocyte Rel % 01/11/2023 16  Not Estab. % Final   • Monocyte Rel % 01/11/2023 10  Not Estab. % Final   • Eosinophil Rel % 01/11/2023 3  Not Estab. % Final   • Basophil Rel % 01/11/2023 1  Not Estab. % Final   • Neutrophils Absolute 01/11/2023 4.4  1.4 - 7.0 x10E3/uL Final   • Lymphocytes Absolute 01/11/2023 1.0  0.7 - 3.1 x10E3/uL Final   • Monocytes Absolute 01/11/2023 0.6  0.1 - 0.9 x10E3/uL Final   • Eosinophils Absolute 01/11/2023 0.2  0.0 - 0.4 x10E3/uL Final   • Basophils Absolute 01/11/2023 0.1  0.0 - 0.2 x10E3/uL Final   • Immature Granulocyte Rel % 01/11/2023 0  Not  Estab. % Final   • Immature Grans Absolute 01/11/2023 0.0  0.0 - 0.1 x10E3/uL Final   • Glucose 01/11/2023 93  70 - 99 mg/dL Final   • BUN 01/11/2023 25  8 - 27 mg/dL Final   • Creatinine 01/11/2023 1.21  0.76 - 1.27 mg/dL Final   • EGFR Result 01/11/2023 67  >59 mL/min/1.73 Final   • BUN/Creatinine Ratio 01/11/2023 21  10 - 24 Final   • Sodium 01/11/2023 134  134 - 144 mmol/L Final   • Potassium 01/11/2023 4.4  3.5 - 5.2 mmol/L Final   • Chloride 01/11/2023 98  96 - 106 mmol/L Final   • Total CO2 01/11/2023 24  20 - 29 mmol/L Final   • Calcium 01/11/2023 9.5  8.6 - 10.2 mg/dL Final   • Total Protein 01/11/2023 6.9  6.0 - 8.5 g/dL Final   • Albumin 01/11/2023 4.1  3.8 - 4.8 g/dL Final   • Globulin 01/11/2023 2.8  1.5 - 4.5 g/dL Final   • A/G Ratio 01/11/2023 1.5  1.2 - 2.2 Final   • Total Bilirubin 01/11/2023 0.2  0.0 - 1.2 mg/dL Final   • Alkaline Phosphatase 01/11/2023 65  44 - 121 IU/L Final   • AST (SGOT) 01/11/2023 14  0 - 40 IU/L Final   • ALT (SGPT) 01/11/2023 12  0 - 44 IU/L Final   • Total Cholesterol 01/11/2023 209 (H)  100 - 199 mg/dL Final   • Triglycerides 01/11/2023 107  0 - 149 mg/dL Final   • HDL Cholesterol 01/11/2023 63  >39 mg/dL Final   • VLDL Cholesterol Damon 01/11/2023 19  5 - 40 mg/dL Final   • LDL Chol Calc (NIH) 01/11/2023 127 (H)  0 - 99 mg/dL Final   • LDL/HDL RATIO 01/11/2023 2.0  0.0 - 3.6 ratio Final    Comment:                                     LDL/HDL Ratio                                              Men  Women                                1/2 Avg.Risk  1.0    1.5                                    Avg.Risk  3.6    3.2                                 2X Avg.Risk  6.2    5.0                                 3X Avg.Risk  8.0    6.1     • Phenobarbital 01/11/2023 22  15 - 40 ug/mL Final                                    Detection Limit = 3         Review of Systems   Constitutional: Negative.    HENT: Negative.    Respiratory: Negative.    Cardiovascular: Negative.     Gastrointestinal: Negative.    Genitourinary: Negative.    Musculoskeletal: Negative.    Skin: Positive for dry skin.   Neurological: Negative.    Psychiatric/Behavioral: Negative.        Objective   Physical Exam  Constitutional:       Appearance: Normal appearance.   HENT:      Head: Normocephalic.   Cardiovascular:      Rate and Rhythm: Normal rate and regular rhythm.      Pulses: Normal pulses.      Heart sounds: Normal heart sounds.   Pulmonary:      Effort: Pulmonary effort is normal.      Breath sounds: Normal breath sounds.   Abdominal:      General: Bowel sounds are normal.   Musculoskeletal:         General: Normal range of motion.   Skin:     General: Skin is warm.   Neurological:      General: No focal deficit present.      Mental Status: He is alert and oriented to person, place, and time.   Psychiatric:         Mood and Affect: Mood normal.         Behavior: Behavior normal.         Procedures    Assessment & Plan   Diagnoses and all orders for this visit:    1. Anemia due to chronic kidney disease, unspecified CKD stage (Primary)  -     CBC & Differential  -     Iron Profile  -     Vitamin B12  -     Ferritin  -     Reticulocytes    2. Hypertension, benign    3. BMI 23.0-23.9, adult    4. Dry skin          Current Outpatient Medications:   •  acetaminophen (TYLENOL) 325 MG tablet, TAKE 2 TABLETS BY MOUTH EVERY 4 HOURS AS NEEDED FOR FEVER AND PAIN ( MAX 12 DOSES IN 24 HOURS), Disp: 60 tablet, Rfl: 10  •  alendronate (FOSAMAX) 70 MG tablet, TAKE 1 TABLET BY MOUTH WEEKLY .TAKE WITH 8OZ WATER BEFORE ANY FOOD OR DRINK. DO NOT LIE DOWN FOR 30 MINUTES, Disp: 4 tablet, Rfl: 0  •  brimonidine-timolol (COMBIGAN) 0.2-0.5 % ophthalmic solution, Administer 1 drop to both eyes 2 (Two) Times a Day., Disp: , Rfl:   •  Calcium + Vitamin D3 600-10 MG-MCG tablet, TAKE 1 TABLET BY MOUTH TWICE DAILY, Disp: 60 tablet, Rfl: 0  •  chlorhexidine (PERIDEX) 0.12 % solution, , Disp: , Rfl:   •  DOCUSIL 100 MG capsule, TAKE ONE  (1) CAPSULE BY MOUTH TWICE A DAY, Disp: 60 capsule, Rfl: 3  •  famotidine (PEPCID) 20 MG tablet, TAKE 1 TABLET BY MOUTH TWICE DAILY, Disp: 60 tablet, Rfl: 6  •  fenofibrate (TRICOR) 48 MG tablet, TAKE 1 TABLET BY MOUTH AT BEDTIME, Disp: 30 tablet, Rfl: 0  •  FeroSul 325 (65 Fe) MG tablet, TAKE 1 TABLET BY MOUTH TWICE DAILY, Disp: 60 tablet, Rfl: 0  •  fluticasone (FLONASE) 50 MCG/ACT nasal spray, 2 sprays into the nostril(s) as directed by provider Daily., Disp: 1 bottle, Rfl: 1  •  GNP Allergy 4 MG tablet, TAKE 1 TABLET BY MOUTH 4 TO 6 HOURS AS NEEDED FOR ALLERGIES AND RUNNY NOSE, Disp: 24 tablet, Rfl: 0  •  GNP Antacid Regular Strength 200-200-20 MG/5ML suspension, , Disp: , Rfl:   •  hydrocortisone 2.5 % cream, , Disp: , Rfl:   •  lisinopril (PRINIVIL,ZESTRIL) 10 MG tablet, Take 1 tablet by mouth Daily., Disp: 30 tablet, Rfl: 5  •  loperamide (IMODIUM) 2 MG capsule, , Disp: , Rfl:   •  loratadine (CLARITIN) 10 MG tablet, TAKE 1 TABLET BY MOUTH EVERY DAY, Disp: 30 tablet, Rfl: 0  •  mupirocin (BACTROBAN) 2 % ointment, APPLY TO RIGHT 5TH TOE TWICE DAILY, Disp: , Rfl:   •  Neomycin-Bacitracin-Polymyxin (GNP TRIPLE ANTIBIOTIC) 3.5-400-5000 ointment, FOLLOW DIRECTIONS ON LABEL AS NEEDED TO PREVENT INFECTIONS IN MINOR CUTS AND SCRAPES. IF NO IMPROVEMENT IN 48 HOURS NOTIFY NURSE, Disp: 28.4 g, Rfl: 12  •  nitrofurantoin, macrocrystal-monohydrate, (Macrobid) 100 MG capsule, Take 1 capsule by mouth 2 (Two) Times a Day., Disp: 20 capsule, Rfl: 0  •  omeprazole (priLOSEC) 20 MG capsule, TAKE 1 CAPSULE BY MOUTH EVERY DAY AS NEEDED ( BUBBLE ), Disp: 30 capsule, Rfl: 0  •  OXcarbazepine (TRILEPTAL) 600 MG tablet, TAKE 1 TABLET BY MOUTH EVERY MORNING AND 2 TABLETS EVERY EVENING, Disp: 90 tablet, Rfl: 9  •  phenazopyridine (Pyridium) 100 MG tablet, Take 1 tablet by mouth 3 (Three) Times a Day As Needed for Bladder Spasms., Disp: 6 tablet, Rfl: 0  •  PHENobarbital (LUMINAL) 97.2 MG tablet, TAKE 1 TABLET BY MOUTH EVERY NIGHT AT  BEDTIME (NARC SHEET), Disp: 30 tablet, Rfl: 5  •  polyethylene glycol (MiraLax) 17 GM/SCOOP powder, Take 17 g by mouth Daily. Mix with water 17g daily with 8oz water prn, Disp: 1 each, Rfl: 2  •  Psyllium (Metamucil [employer] Fiber) 55.46 % powder, Take 1 teaspoon(s) by mouth 2 (Two) Times a Day. 1 tsp 1-2 x day as needed, Disp: 1 g, Rfl: 1  •  risperiDONE (risperDAL) 2 MG tablet, TAKE 1 TABLET BY MOUTH TWICE DAILY, Disp: 60 tablet, Rfl: 4  •  tamsulosin (FLOMAX) 0.4 MG capsule 24 hr capsule, Take 1 capsule by mouth Daily., Disp: , Rfl:   •  tolnaftate (TINACTIN) 1 % external solution, Apply twice a day to affected area until resolved, Disp: 29.5 mL, Rfl: 2  •  GNP Milk of Magnesia 1200 MG/15ML suspension, , Disp: , Rfl:            HORTENCIA Valentine 3/30/2023 12:17 EDT  This note has been electronically signed

## 2023-03-31 LAB
BASOPHILS # BLD AUTO: 0 X10E3/UL (ref 0–0.2)
BASOPHILS NFR BLD AUTO: 1 %
EOSINOPHIL # BLD AUTO: 0.2 X10E3/UL (ref 0–0.4)
EOSINOPHIL NFR BLD AUTO: 4 %
ERYTHROCYTE [DISTWIDTH] IN BLOOD BY AUTOMATED COUNT: 12.2 % (ref 11.6–15.4)
FERRITIN SERPL-MCNC: 491 NG/ML (ref 30–400)
HCT VFR BLD AUTO: 34 % (ref 37.5–51)
HGB BLD-MCNC: 11.7 G/DL (ref 13–17.7)
IMM GRANULOCYTES # BLD AUTO: 0 X10E3/UL (ref 0–0.1)
IMM GRANULOCYTES NFR BLD AUTO: 0 %
IRON SATN MFR SERPL: 31 % (ref 15–55)
IRON SERPL-MCNC: 96 UG/DL (ref 38–169)
LYMPHOCYTES # BLD AUTO: 0.8 X10E3/UL (ref 0.7–3.1)
LYMPHOCYTES NFR BLD AUTO: 19 %
MCH RBC QN AUTO: 33.3 PG (ref 26.6–33)
MCHC RBC AUTO-ENTMCNC: 34.4 G/DL (ref 31.5–35.7)
MCV RBC AUTO: 97 FL (ref 79–97)
MONOCYTES # BLD AUTO: 0.5 X10E3/UL (ref 0.1–0.9)
MONOCYTES NFR BLD AUTO: 13 %
NEUTROPHILS # BLD AUTO: 2.7 X10E3/UL (ref 1.4–7)
NEUTROPHILS NFR BLD AUTO: 63 %
PLATELET # BLD AUTO: 194 X10E3/UL (ref 150–450)
RBC # BLD AUTO: 3.51 X10E6/UL (ref 4.14–5.8)
RETICS/RBC NFR AUTO: 1 % (ref 0.6–2.6)
TIBC SERPL-MCNC: 313 UG/DL (ref 250–450)
UIBC SERPL-MCNC: 217 UG/DL (ref 111–343)
VIT B12 SERPL-MCNC: 635 PG/ML (ref 232–1245)
WBC # BLD AUTO: 4.3 X10E3/UL (ref 3.4–10.8)

## 2023-04-20 ENCOUNTER — OFFICE VISIT (OUTPATIENT)
Dept: NEUROLOGY | Facility: CLINIC | Age: 63
End: 2023-04-20
Payer: MEDICARE

## 2023-04-20 VITALS
WEIGHT: 156 LBS | DIASTOLIC BLOOD PRESSURE: 76 MMHG | HEIGHT: 68 IN | BODY MASS INDEX: 23.64 KG/M2 | SYSTOLIC BLOOD PRESSURE: 135 MMHG | HEART RATE: 73 BPM

## 2023-04-20 DIAGNOSIS — G40.109 SEIZURE, TEMPORAL LOBE: Primary | ICD-10-CM

## 2023-04-20 PROCEDURE — 99213 OFFICE O/P EST LOW 20 MIN: CPT | Performed by: PSYCHIATRY & NEUROLOGY

## 2023-04-20 PROCEDURE — 3078F DIAST BP <80 MM HG: CPT | Performed by: PSYCHIATRY & NEUROLOGY

## 2023-04-20 PROCEDURE — 3075F SYST BP GE 130 - 139MM HG: CPT | Performed by: PSYCHIATRY & NEUROLOGY

## 2023-04-20 RX ORDER — GUAIFENESIN 100 MG/5ML
SYRUP ORAL
COMMUNITY
Start: 2023-04-04

## 2023-04-20 RX ORDER — OXCARBAZEPINE 600 MG/1
TABLET, FILM COATED ORAL
Qty: 90 TABLET | Refills: 11 | Status: SHIPPED | OUTPATIENT
Start: 2023-04-20

## 2023-04-20 NOTE — PROGRESS NOTES
Chief Complaint  Seizures    Subjective          Manuel Gaspar presents to [facility]UR[facility] for SEIZURES  History of Present Illness  Patient is here to f/u on seizures. No seizures since last visit.    The prior year he had had one brief cps    Na level in normal range wbc was normal as well     PHENobarbital 97.2 mg 1 qhs, and trileptal 600 mg 1 qam and 2 qhs and reports no side effects.        ===PREV. OV 3/30/22 DR SEIPEL======  Patient is here to f/u on seizure’s patient caregiver ( group home manger)  Only one spell in past year    states last seizure was last thursday and lasted about 5 minutes   she states his eye where glossed over,not coherent, and drooling, did not respond to voice.   Vital signs were normal after spell.    she states patient did not have convulsion seizure.   patient is currently taking PHENobarbital 97.2 mg 1 qhs and trileptal 600 mg 1 qam and 2 qhs and reports no side effects.       Current Outpatient Medications:   •  acetaminophen (TYLENOL) 325 MG tablet, TAKE 2 TABLETS BY MOUTH EVERY 4 HOURS AS NEEDED FOR FEVER AND PAIN ( MAX 12 DOSES IN 24 HOURS), Disp: 60 tablet, Rfl: 10  •  alendronate (FOSAMAX) 70 MG tablet, TAKE 1 TABLET BY MOUTH WEEKLY .TAKE WITH 8OZ WATER BEFORE ANY FOOD OR DRINK. DO NOT LIE DOWN FOR 30 MINUTES, Disp: 4 tablet, Rfl: 0  •  brimonidine-timolol (COMBIGAN) 0.2-0.5 % ophthalmic solution, Administer 1 drop to both eyes 2 (Two) Times a Day., Disp: , Rfl:   •  Calcium + Vitamin D3 600-10 MG-MCG tablet, TAKE 1 TABLET BY MOUTH TWICE DAILY, Disp: 60 tablet, Rfl: 0  •  chlorhexidine (PERIDEX) 0.12 % solution, , Disp: , Rfl:   •  Colloidal Oatmeal (Aveeno Eczema Therapy) 1 % cream, Apply to dry skin 3 times a day, Disp: 354 each, Rfl: 5  •  DOCUSIL 100 MG capsule, TAKE ONE (1) CAPSULE BY MOUTH TWICE A DAY, Disp: 60 capsule, Rfl: 3  •  famotidine (PEPCID) 20 MG tablet, TAKE 1 TABLET BY MOUTH TWICE DAILY, Disp: 60 tablet, Rfl: 6  •  fenofibrate (TRICOR) 48  MG tablet, TAKE 1 TABLET BY MOUTH AT BEDTIME, Disp: 30 tablet, Rfl: 0  •  FeroSul 325 (65 Fe) MG tablet, TAKE 1 TABLET BY MOUTH TWICE DAILY, Disp: 60 tablet, Rfl: 0  •  fluticasone (FLONASE) 50 MCG/ACT nasal spray, 2 sprays into the nostril(s) as directed by provider Daily., Disp: 1 bottle, Rfl: 1  •  GNP Allergy 4 MG tablet, TAKE 1 TABLET BY MOUTH 4 TO 6 HOURS AS NEEDED FOR ALLERGIES AND RUNNY NOSE, Disp: 24 tablet, Rfl: 0  •  GNP Antacid Regular Strength 200-200-20 MG/5ML suspension, , Disp: , Rfl:   •  GNP Milk of Magnesia 1200 MG/15ML suspension, , Disp: , Rfl:   •  hydrocortisone 2.5 % cream, , Disp: , Rfl:   •  lisinopril (PRINIVIL,ZESTRIL) 10 MG tablet, 1 tablet as needed for blood pressure greater than 150 systolic or 84 diastolic, Disp: 30 tablet, Rfl: 5  •  loperamide (IMODIUM) 2 MG capsule, , Disp: , Rfl:   •  loratadine (CLARITIN) 10 MG tablet, TAKE 1 TABLET BY MOUTH EVERY DAY, Disp: 30 tablet, Rfl: 0  •  mupirocin (BACTROBAN) 2 % ointment, APPLY TO RIGHT 5TH TOE TWICE DAILY, Disp: , Rfl:   •  Neomycin-Bacitracin-Polymyxin (GNP TRIPLE ANTIBIOTIC) 3.5-400-5000 ointment, FOLLOW DIRECTIONS ON LABEL AS NEEDED TO PREVENT INFECTIONS IN MINOR CUTS AND SCRAPES. IF NO IMPROVEMENT IN 48 HOURS NOTIFY NURSE, Disp: 28.4 g, Rfl: 12  •  nitrofurantoin, macrocrystal-monohydrate, (Macrobid) 100 MG capsule, Take 1 capsule by mouth 2 (Two) Times a Day., Disp: 20 capsule, Rfl: 0  •  omeprazole (priLOSEC) 20 MG capsule, TAKE 1 CAPSULE BY MOUTH EVERY DAY AS NEEDED ( BUBBLE ), Disp: 30 capsule, Rfl: 0  •  OXcarbazepine (TRILEPTAL) 600 MG tablet, One tablet in am and two each pm, Disp: 90 tablet, Rfl: 11  •  phenazopyridine (Pyridium) 100 MG tablet, Take 1 tablet by mouth 3 (Three) Times a Day As Needed for Bladder Spasms., Disp: 6 tablet, Rfl: 0  •  PHENobarbital (LUMINAL) 97.2 MG tablet, TAKE 1 TABLET BY MOUTH EVERY NIGHT AT BEDTIME (NARC SHEET), Disp: 30 tablet, Rfl: 5  •  polyethylene glycol (MiraLax) 17 GM/SCOOP powder,  "Take 17 g by mouth Daily. Mix with water 17g daily with 8oz water prn, Disp: 1 each, Rfl: 2  •  Psyllium (Metamucil [employer] Fiber) 55.46 % powder, Take 1 teaspoon(s) by mouth 2 (Two) Times a Day. 1 tsp 1-2 x day as needed, Disp: 1 g, Rfl: 1  •  risperiDONE (risperDAL) 2 MG tablet, TAKE 1 TABLET BY MOUTH TWICE DAILY, Disp: 60 tablet, Rfl: 4  •  Robafen 100 MG/5ML liquid, , Disp: , Rfl:   •  tamsulosin (FLOMAX) 0.4 MG capsule 24 hr capsule, Take 1 capsule by mouth Daily., Disp: , Rfl:   •  tolnaftate (TINACTIN) 1 % external solution, Apply twice a day to affected area until resolved, Disp: 29.5 mL, Rfl: 2  •  white petrolatum gel gel, Apply 1 application topically to the appropriate area as directed As Needed (Dry skin). Apply to hands at night and cover with cotton gloves overnight, Disp: 212 g, Rfl: 2    Review of Systems   Constitutional: Positive for activity change.   HENT: Negative.    Eyes: Negative.    Respiratory: Negative.    Cardiovascular: Negative.    Gastrointestinal: Negative.    Genitourinary: Negative.    Musculoskeletal: Negative.    Neurological: Negative.    Hematological: Negative.    Psychiatric/Behavioral: Negative.           Objective:    Vital Signs:   /76 (BP Location: Left arm)   Pulse 73   Ht 172.7 cm (67.99\")   Wt 70.8 kg (156 lb)   BMI 23.73 kg/m²     Physical Exam  Vitals reviewed.   Pulmonary:      Effort: Pulmonary effort is normal. No respiratory distress.   Neurological:      Mental Status: He is alert and oriented to person, place, and time.   Psychiatric:         Mood and Affect: Mood normal.        Result Review :                Neurologic Exam     Mental Status   Oriented to person, place, and time.         Assessment and Plan    Diagnoses and all orders for this visit:    1. Seizure, temporal lobe (Primary)  -     OXcarbazepine (TRILEPTAL) 600 MG tablet; One tablet in am and two each pm  Dispense: 90 tablet; Refill: 11       Continue trileptal  And " phenobarbital    Routine labs one per year.   Cbc and cmp yearly       Follow Up   Return in about 1 year (around 4/20/2024).  Patient was given instructions and counseling regarding his condition or for health maintenance advice. Please see specific information pulled into the AVS if appropriate.     This document has been electronically signed by Joseph Seipel, MD on April 20, 2023 14:36 EDT

## 2023-04-20 NOTE — LETTER
April 20, 2023     Manuel Gaspar    Patient: Manuel Gaspar   YOB: 1960   Date of Visit: 4/20/2023       Dear Dr. Gaspar:    Thank you for referring Manuel Gaspar to me for evaluation. Below are the relevant portions of my assessment and plan of care.    If you have questions, please do not hesitate to call me. I look forward to following Manuel along with you.         Sincerely,        Joseph F Seipel, MD        CC: No Recipients    Seipel, Joseph F, MD  04/20/23 1438  Signed  Chief Complaint  Seizures    Subjective           Manuel Gaspar presents to [facility]UR[facility] for SEIZURES  History of Present Illness  Patient is here to f/u on seizures. No seizures since last visit.    The prior year he had had one brief cps    Na level in normal range wbc was normal as well     PHENobarbital 97.2 mg 1 qhs, and trileptal 600 mg 1 qam and 2 qhs and reports no side effects.        ===PREV. OV 3/30/22 DR SEIPEL======  Patient is here to f/u on seizure’s patient caregiver ( group home manger)  Only one spell in past year    states last seizure was last thursday and lasted about 5 minutes   she states his eye where glossed over,not coherent, and drooling, did not respond to voice.   Vital signs were normal after spell.    she states patient did not have convulsion seizure.   patient is currently taking PHENobarbital 97.2 mg 1 qhs and trileptal 600 mg 1 qam and 2 qhs and reports no side effects.       Current Outpatient Medications:   •  acetaminophen (TYLENOL) 325 MG tablet, TAKE 2 TABLETS BY MOUTH EVERY 4 HOURS AS NEEDED FOR FEVER AND PAIN ( MAX 12 DOSES IN 24 HOURS), Disp: 60 tablet, Rfl: 10  •  alendronate (FOSAMAX) 70 MG tablet, TAKE 1 TABLET BY MOUTH WEEKLY .TAKE WITH 8OZ WATER BEFORE ANY FOOD OR DRINK. DO NOT LIE DOWN FOR 30 MINUTES, Disp: 4 tablet, Rfl: 0  •  brimonidine-timolol (COMBIGAN) 0.2-0.5 % ophthalmic solution, Administer 1 drop to both eyes 2 (Two) Times a Day., Disp: , Rfl:   •  Calcium +  Vitamin D3 600-10 MG-MCG tablet, TAKE 1 TABLET BY MOUTH TWICE DAILY, Disp: 60 tablet, Rfl: 0  •  chlorhexidine (PERIDEX) 0.12 % solution, , Disp: , Rfl:   •  Colloidal Oatmeal (Aveeno Eczema Therapy) 1 % cream, Apply to dry skin 3 times a day, Disp: 354 each, Rfl: 5  •  DOCUSIL 100 MG capsule, TAKE ONE (1) CAPSULE BY MOUTH TWICE A DAY, Disp: 60 capsule, Rfl: 3  •  famotidine (PEPCID) 20 MG tablet, TAKE 1 TABLET BY MOUTH TWICE DAILY, Disp: 60 tablet, Rfl: 6  •  fenofibrate (TRICOR) 48 MG tablet, TAKE 1 TABLET BY MOUTH AT BEDTIME, Disp: 30 tablet, Rfl: 0  •  FeroSul 325 (65 Fe) MG tablet, TAKE 1 TABLET BY MOUTH TWICE DAILY, Disp: 60 tablet, Rfl: 0  •  fluticasone (FLONASE) 50 MCG/ACT nasal spray, 2 sprays into the nostril(s) as directed by provider Daily., Disp: 1 bottle, Rfl: 1  •  GNP Allergy 4 MG tablet, TAKE 1 TABLET BY MOUTH 4 TO 6 HOURS AS NEEDED FOR ALLERGIES AND RUNNY NOSE, Disp: 24 tablet, Rfl: 0  •  GNP Antacid Regular Strength 200-200-20 MG/5ML suspension, , Disp: , Rfl:   •  GNP Milk of Magnesia 1200 MG/15ML suspension, , Disp: , Rfl:   •  hydrocortisone 2.5 % cream, , Disp: , Rfl:   •  lisinopril (PRINIVIL,ZESTRIL) 10 MG tablet, 1 tablet as needed for blood pressure greater than 150 systolic or 84 diastolic, Disp: 30 tablet, Rfl: 5  •  loperamide (IMODIUM) 2 MG capsule, , Disp: , Rfl:   •  loratadine (CLARITIN) 10 MG tablet, TAKE 1 TABLET BY MOUTH EVERY DAY, Disp: 30 tablet, Rfl: 0  •  mupirocin (BACTROBAN) 2 % ointment, APPLY TO RIGHT 5TH TOE TWICE DAILY, Disp: , Rfl:   •  Neomycin-Bacitracin-Polymyxin (GNP TRIPLE ANTIBIOTIC) 3.5-400-5000 ointment, FOLLOW DIRECTIONS ON LABEL AS NEEDED TO PREVENT INFECTIONS IN MINOR CUTS AND SCRAPES. IF NO IMPROVEMENT IN 48 HOURS NOTIFY NURSE, Disp: 28.4 g, Rfl: 12  •  nitrofurantoin, macrocrystal-monohydrate, (Macrobid) 100 MG capsule, Take 1 capsule by mouth 2 (Two) Times a Day., Disp: 20 capsule, Rfl: 0  •  omeprazole (priLOSEC) 20 MG capsule, TAKE 1 CAPSULE BY MOUTH  "EVERY DAY AS NEEDED ( BUBBLE ), Disp: 30 capsule, Rfl: 0  •  OXcarbazepine (TRILEPTAL) 600 MG tablet, One tablet in am and two each pm, Disp: 90 tablet, Rfl: 11  •  phenazopyridine (Pyridium) 100 MG tablet, Take 1 tablet by mouth 3 (Three) Times a Day As Needed for Bladder Spasms., Disp: 6 tablet, Rfl: 0  •  PHENobarbital (LUMINAL) 97.2 MG tablet, TAKE 1 TABLET BY MOUTH EVERY NIGHT AT BEDTIME (NARC SHEET), Disp: 30 tablet, Rfl: 5  •  polyethylene glycol (MiraLax) 17 GM/SCOOP powder, Take 17 g by mouth Daily. Mix with water 17g daily with 8oz water prn, Disp: 1 each, Rfl: 2  •  Psyllium (Metamucil [employer] Fiber) 55.46 % powder, Take 1 teaspoon(s) by mouth 2 (Two) Times a Day. 1 tsp 1-2 x day as needed, Disp: 1 g, Rfl: 1  •  risperiDONE (risperDAL) 2 MG tablet, TAKE 1 TABLET BY MOUTH TWICE DAILY, Disp: 60 tablet, Rfl: 4  •  Robafen 100 MG/5ML liquid, , Disp: , Rfl:   •  tamsulosin (FLOMAX) 0.4 MG capsule 24 hr capsule, Take 1 capsule by mouth Daily., Disp: , Rfl:   •  tolnaftate (TINACTIN) 1 % external solution, Apply twice a day to affected area until resolved, Disp: 29.5 mL, Rfl: 2  •  white petrolatum gel gel, Apply 1 application topically to the appropriate area as directed As Needed (Dry skin). Apply to hands at night and cover with cotton gloves overnight, Disp: 212 g, Rfl: 2    Review of Systems   Constitutional: Positive for activity change.   HENT: Negative.    Eyes: Negative.    Respiratory: Negative.    Cardiovascular: Negative.    Gastrointestinal: Negative.    Genitourinary: Negative.    Musculoskeletal: Negative.    Neurological: Negative.    Hematological: Negative.    Psychiatric/Behavioral: Negative.           Objective:    Vital Signs:   /76 (BP Location: Left arm)   Pulse 73   Ht 172.7 cm (67.99\")   Wt 70.8 kg (156 lb)   BMI 23.73 kg/m²     Physical Exam  Vitals reviewed.   Pulmonary:      Effort: Pulmonary effort is normal. No respiratory distress.   Neurological:      Mental Status: " He is alert and oriented to person, place, and time.   Psychiatric:         Mood and Affect: Mood normal.        Result Review :                Neurologic Exam     Mental Status   Oriented to person, place, and time.         Assessment and Plan    Diagnoses and all orders for this visit:    1. Seizure, temporal lobe (Primary)  -     OXcarbazepine (TRILEPTAL) 600 MG tablet; One tablet in am and two each pm  Dispense: 90 tablet; Refill: 11       Continue trileptal  And phenobarbital    Routine labs one per year.   Cbc and cmp yearly       Follow Up   Return in about 1 year (around 4/20/2024).  Patient was given instructions and counseling regarding his condition or for health maintenance advice. Please see specific information pulled into the AVS if appropriate.     This document has been electronically signed by Joseph Seipel, MD on April 20, 2023 14:36 EDT

## 2023-04-24 RX ORDER — FENOFIBRATE 48 MG/1
TABLET, COATED ORAL
Qty: 30 TABLET | Refills: 0 | Status: SHIPPED | OUTPATIENT
Start: 2023-04-24

## 2023-04-24 RX ORDER — FERROUS SULFATE 325(65) MG
TABLET ORAL
Qty: 60 TABLET | Refills: 0 | Status: SHIPPED | OUTPATIENT
Start: 2023-04-24

## 2023-04-24 RX ORDER — ALENDRONATE SODIUM 70 MG/1
TABLET ORAL
Qty: 4 TABLET | Refills: 0 | Status: SHIPPED | OUTPATIENT
Start: 2023-04-24

## 2023-04-24 RX ORDER — LORATADINE 10 MG/1
TABLET ORAL
Qty: 30 TABLET | Refills: 0 | Status: SHIPPED | OUTPATIENT
Start: 2023-04-24

## 2023-04-24 RX ORDER — CALCIUM CARBONATE/VITAMIN D3 600 MG-10
TABLET ORAL
Qty: 60 TABLET | Refills: 0 | Status: SHIPPED | OUTPATIENT
Start: 2023-04-24

## 2023-05-22 RX ORDER — LORATADINE 10 MG/1
TABLET ORAL
Qty: 30 TABLET | Refills: 0 | Status: SHIPPED | OUTPATIENT
Start: 2023-05-22

## 2023-05-22 RX ORDER — FENOFIBRATE 48 MG/1
TABLET, COATED ORAL
Qty: 30 TABLET | Refills: 0 | Status: SHIPPED | OUTPATIENT
Start: 2023-05-22

## 2023-05-22 RX ORDER — ALENDRONATE SODIUM 70 MG/1
TABLET ORAL
Qty: 4 TABLET | Refills: 0 | Status: SHIPPED | OUTPATIENT
Start: 2023-05-22

## 2023-05-22 RX ORDER — FERROUS SULFATE 325(65) MG
TABLET ORAL
Qty: 60 TABLET | Refills: 0 | Status: SHIPPED | OUTPATIENT
Start: 2023-05-22

## 2023-05-22 RX ORDER — CALCIUM CARBONATE/VITAMIN D3 600 MG-10
TABLET ORAL
Qty: 60 TABLET | Refills: 0 | Status: SHIPPED | OUTPATIENT
Start: 2023-05-22

## 2023-06-02 PROCEDURE — 87086 URINE CULTURE/COLONY COUNT: CPT | Performed by: NURSE PRACTITIONER

## 2023-06-08 ENCOUNTER — OFFICE VISIT (OUTPATIENT)
Dept: FAMILY MEDICINE CLINIC | Facility: CLINIC | Age: 63
End: 2023-06-08
Payer: MEDICARE

## 2023-06-08 VITALS
TEMPERATURE: 97.5 F | RESPIRATION RATE: 18 BRPM | OXYGEN SATURATION: 96 % | BODY MASS INDEX: 23.64 KG/M2 | DIASTOLIC BLOOD PRESSURE: 90 MMHG | WEIGHT: 156 LBS | HEART RATE: 66 BPM | SYSTOLIC BLOOD PRESSURE: 138 MMHG | HEIGHT: 68 IN

## 2023-06-08 DIAGNOSIS — N30.01 ACUTE CYSTITIS WITH HEMATURIA: Primary | ICD-10-CM

## 2023-06-08 NOTE — PROGRESS NOTES
"    Manuel Gaspar is a 63 y.o. male.     History of Present Illness  63-year-old white male in a group home with history of MR, anxiety, schizophrenia, osteoporosis, hypertension, kidney stones, chronic anemia, neurogenic bladder, hearing loss, chronic hyponatremia, chronic UTIs and seizure disorder who comes in with caregiver today after urgent care visit 5 to 6 days ago for urinary frequency.  Patient urinalysis showed 3+ bacteria trace of blood was placed on Keflex 4 times daily for 4 days.  He just finished his last dose today and caregiver would want a follow-up urine however I explained to her he needs to be off antibiotics for for 5 days before we can get the urine sample.  He states he is not having any symptoms anymore but he is going out of town for 3 days will be back Sunday.  I asked caregiver if they could drop a urine sample off in a specimen cup and we can recheck it when he gets back on Monday    Vital signs are stable at 138/88 heart rate 66          Follow-up urine on Monday  Encourage fluid intake  Report any new symptoms     The following portions of the patient's history were reviewed and updated as appropriate: allergies, current medications, past family history, past medical history, past social history, past surgical history, and problem list.    Vitals:    06/08/23 1334   BP: 138/90   BP Location: Right arm   Patient Position: Sitting   Cuff Size: Adult   Pulse: 66   Resp: 18   Temp: 97.5 °F (36.4 °C)   TempSrc: Infrared   SpO2: 96%   Weight: 70.8 kg (156 lb)   Height: 172.7 cm (67.99\")     Body mass index is 23.73 kg/m².    Past Medical History:   Diagnosis Date    Hearing loss     Hypertension     Kidney stone     Mental retardation     Neurogenic bladder     Seizure disorder     onset 16-17 years old, last seizure about 2015 when weaning phenobarb     Past Surgical History:   Procedure Laterality Date    BLADDER REPAIR      CATARACT EXTRACTION      ORIF PATELLA FRACTURE Left     THORACOTOMY   "    TYMPANOMASTOIDECTOMY       Family History   Family history unknown: Yes     Immunization History   Administered Date(s) Administered    COVID-19 (MODERNA) 1st,2nd,3rd Dose Monovalent 01/11/2021, 02/08/2021, 10/17/2021    Flu Vaccine Intradermal Quad 18-64YR 10/05/2018, 11/19/2021    Flu Vaccine Quad PF >36MO 11/23/2016, 11/12/2019    FluLaval/Fluzone >6mos 10/21/2020    Fluzone Quad >6mos (Multi-dose) 10/06/2015    Td 06/13/2014       Admission on 06/02/2023, Discharged on 06/02/2023   Component Date Value Ref Range Status    Color 06/02/2023 Yellow   Final-Edited    Clarity, UA 06/02/2023 Clear   Final-Edited    Specific Gravity  06/02/2023 1.015  1.005 - 1.030 Final-Edited    Leukocytes 06/02/2023 Moderate (2+) (A)   Corrected    Nitrite, UA 06/02/2023 Negative   Corrected    Protein, POC 06/02/2023 Trace (A)  mg/dL Corrected    Glucose, UA 06/02/2023 Negative  mg/dL Corrected    Ketones, UA 06/02/2023 Negative   Corrected    Urobilinogen, UA 06/02/2023 0.2 E.U./dL   Corrected    Bilirubin 06/02/2023 Negative   Corrected    Blood, UA 06/02/2023 Small (A)   Corrected    pH, Urine 06/02/2023 7.0  5.0 - 8.0 Final    Lot Number 06/02/2023 [id number]   Final    Expiration Date 06/02/2023 6/30/24   Final    Urine Culture 06/02/2023 No growth   Final         Review of Systems    Objective   Physical Exam    Procedures    Assessment & Plan         Current Outpatient Medications:     acetaminophen (TYLENOL) 325 MG tablet, TAKE 2 TABLETS BY MOUTH EVERY 4 HOURS AS NEEDED FOR FEVER AND PAIN ( MAX 12 DOSES IN 24 HOURS), Disp: 60 tablet, Rfl: 10    alendronate (FOSAMAX) 70 MG tablet, TAKE 1 TABLET BY MOUTH WEEKLY .TAKE WITH 8OZ WATER BEFORE ANY FOOD OR DRINK. DO NOT LIE DOWN FOR 30 MINUTES, Disp: 4 tablet, Rfl: 0    brimonidine-timolol (COMBIGAN) 0.2-0.5 % ophthalmic solution, Administer 1 drop to both eyes 2 (Two) Times a Day., Disp: , Rfl:     Calcium + Vitamin D3 600-10 MG-MCG tablet per tablet, TAKE 1 TABLET BY MOUTH TWICE  DAILY, Disp: 60 tablet, Rfl: 0    Colloidal Oatmeal (Aveeno Eczema Therapy) 1 % cream, Apply to dry skin 3 times a day, Disp: 354 each, Rfl: 5    DOCUSIL 100 MG capsule, TAKE ONE (1) CAPSULE BY MOUTH TWICE A DAY, Disp: 60 capsule, Rfl: 3    famotidine (PEPCID) 20 MG tablet, TAKE 1 TABLET BY MOUTH TWICE DAILY, Disp: 60 tablet, Rfl: 6    fenofibrate (TRICOR) 48 MG tablet, TAKE 1 TABLET BY MOUTH AT BEDTIME, Disp: 30 tablet, Rfl: 0    FeroSul 325 (65 Fe) MG tablet, TAKE 1 TABLET BY MOUTH TWICE DAILY, Disp: 60 tablet, Rfl: 0    GNP Allergy 4 MG tablet, TAKE 1 TABLET BY MOUTH 4 TO 6 HOURS AS NEEDED FOR ALLERGIES AND RUNNY NOSE, Disp: 24 tablet, Rfl: 0    GNP Antacid Regular Strength 200-200-20 MG/5ML suspension, , Disp: , Rfl:     GNP Milk of Magnesia 1200 MG/15ML suspension, , Disp: , Rfl:     hydrocortisone 2.5 % cream, , Disp: , Rfl:     lisinopril (PRINIVIL,ZESTRIL) 10 MG tablet, 1 tablet as needed for blood pressure greater than 150 systolic or 84 diastolic, Disp: 30 tablet, Rfl: 5    loperamide (IMODIUM) 2 MG capsule, , Disp: , Rfl:     loratadine (CLARITIN) 10 MG tablet, TAKE 1 TABLET BY MOUTH EVERY DAY, Disp: 30 tablet, Rfl: 0    mupirocin (BACTROBAN) 2 % ointment, APPLY TO RIGHT 5TH TOE TWICE DAILY, Disp: , Rfl:     Neomycin-Bacitracin-Polymyxin (GNP TRIPLE ANTIBIOTIC) 3.5-400-5000 ointment, FOLLOW DIRECTIONS ON LABEL AS NEEDED TO PREVENT INFECTIONS IN MINOR CUTS AND SCRAPES. IF NO IMPROVEMENT IN 48 HOURS NOTIFY NURSE, Disp: 28.4 g, Rfl: 12    omeprazole (priLOSEC) 20 MG capsule, TAKE 1 CAPSULE BY MOUTH EVERY DAY AS NEEDED ( BUBBLE ), Disp: 30 capsule, Rfl: 0    OXcarbazepine (TRILEPTAL) 600 MG tablet, One tablet in am and two each pm, Disp: 90 tablet, Rfl: 11    PHENobarbital (LUMINAL) 97.2 MG tablet, TAKE 1 TABLET BY MOUTH EVERY NIGHT AT BEDTIME (NARC SHEET), Disp: 30 tablet, Rfl: 5    polyethylene glycol (MiraLax) 17 GM/SCOOP powder, Take 17 g by mouth Daily. Mix with water 17g daily with 8oz water prn, Disp:  1 each, Rfl: 2    Psyllium (Metamucil [facility] Fiber) 55.46 % powder, Take 1 teaspoon(s) by mouth 2 (Two) Times a Day. 1 tsp 1-2 x day as needed, Disp: 1 g, Rfl: 1    risperiDONE (risperDAL) 2 MG tablet, TAKE 1 TABLET BY MOUTH TWICE DAILY, Disp: 60 tablet, Rfl: 4    Robafen 100 MG/5ML liquid, , Disp: , Rfl:     tamsulosin (FLOMAX) 0.4 MG capsule 24 hr capsule, Take 1 capsule by mouth Daily., Disp: , Rfl:     tolnaftate (TINACTIN) 1 % external solution, Apply twice a day to affected area until resolved, Disp: 29.5 mL, Rfl: 2    white petrolatum gel gel, Apply 1 application topically to the appropriate area as directed As Needed (Dry skin). Apply to hands at night and cover with cotton gloves overnight, Disp: 212 g, Rfl: 2           Dang Holder, APRN 6/8/2023 15:34 EDT  This note has been electronically signed

## 2023-06-17 DIAGNOSIS — F20.0 PARANOID SCHIZOPHRENIA: ICD-10-CM

## 2023-06-19 RX ORDER — FENOFIBRATE 48 MG/1
TABLET, COATED ORAL
Qty: 30 TABLET | Refills: 0 | Status: SHIPPED | OUTPATIENT
Start: 2023-06-19

## 2023-06-19 RX ORDER — CALCIUM CARBONATE/VITAMIN D3 600 MG-10
TABLET ORAL
Qty: 60 TABLET | Refills: 0 | Status: SHIPPED | OUTPATIENT
Start: 2023-06-19

## 2023-06-19 RX ORDER — ALENDRONATE SODIUM 70 MG/1
TABLET ORAL
Qty: 4 TABLET | Refills: 0 | Status: SHIPPED | OUTPATIENT
Start: 2023-06-19

## 2023-06-19 RX ORDER — LORATADINE 10 MG/1
TABLET ORAL
Qty: 30 TABLET | Refills: 0 | Status: SHIPPED | OUTPATIENT
Start: 2023-06-19

## 2023-06-19 RX ORDER — FAMOTIDINE 20 MG/1
TABLET, FILM COATED ORAL
Qty: 60 TABLET | Refills: 5 | Status: SHIPPED | OUTPATIENT
Start: 2023-06-19

## 2023-06-19 RX ORDER — RISPERIDONE 2 MG/1
TABLET ORAL
Qty: 60 TABLET | Refills: 3 | Status: SHIPPED | OUTPATIENT
Start: 2023-06-19

## 2023-06-19 RX ORDER — FERROUS SULFATE 325(65) MG
TABLET ORAL
Qty: 60 TABLET | Refills: 0 | Status: SHIPPED | OUTPATIENT
Start: 2023-06-19

## 2023-08-13 RX ORDER — LORATADINE 10 MG/1
TABLET ORAL
Qty: 30 TABLET | Refills: 0 | Status: SHIPPED | OUTPATIENT
Start: 2023-08-13

## 2023-08-13 RX ORDER — ALENDRONATE SODIUM 70 MG/1
TABLET ORAL
Qty: 4 TABLET | Refills: 0 | Status: SHIPPED | OUTPATIENT
Start: 2023-08-13

## 2023-08-13 RX ORDER — CALCIUM CARBONATE/VITAMIN D3 600 MG-10
TABLET ORAL
Qty: 60 TABLET | Refills: 0 | Status: SHIPPED | OUTPATIENT
Start: 2023-08-13

## 2023-08-13 RX ORDER — FERROUS SULFATE 325(65) MG
TABLET ORAL
Qty: 60 TABLET | Refills: 0 | Status: SHIPPED | OUTPATIENT
Start: 2023-08-13

## 2023-08-13 RX ORDER — FENOFIBRATE 48 MG/1
TABLET, COATED ORAL
Qty: 30 TABLET | Refills: 0 | Status: SHIPPED | OUTPATIENT
Start: 2023-08-13

## 2023-08-23 RX ORDER — CHLORHEXIDINE GLUCONATE 0.12 MG/ML
RINSE ORAL
Qty: 946 ML | Refills: 0 | Status: SHIPPED | OUTPATIENT
Start: 2023-08-23

## 2023-09-08 DIAGNOSIS — G40.109 SEIZURE, TEMPORAL LOBE: ICD-10-CM

## 2023-09-08 RX ORDER — FENOFIBRATE 48 MG/1
TABLET, COATED ORAL
Qty: 30 TABLET | Refills: 0 | Status: SHIPPED | OUTPATIENT
Start: 2023-09-08

## 2023-09-08 RX ORDER — FERROUS SULFATE 325(65) MG
TABLET ORAL
Qty: 60 TABLET | Refills: 0 | Status: SHIPPED | OUTPATIENT
Start: 2023-09-08

## 2023-09-08 RX ORDER — ALENDRONATE SODIUM 70 MG/1
TABLET ORAL
Qty: 4 TABLET | Refills: 0 | Status: SHIPPED | OUTPATIENT
Start: 2023-09-08

## 2023-09-08 RX ORDER — CALCIUM CARBONATE/VITAMIN D3 600 MG-10
TABLET ORAL
Qty: 60 TABLET | Refills: 0 | Status: SHIPPED | OUTPATIENT
Start: 2023-09-08

## 2023-09-08 RX ORDER — LORATADINE 10 MG/1
TABLET ORAL
Qty: 30 TABLET | Refills: 0 | Status: SHIPPED | OUTPATIENT
Start: 2023-09-08

## 2023-09-11 RX ORDER — PHENOBARBITAL 97.2 MG/1
TABLET ORAL
Qty: 30 TABLET | Refills: 4 | Status: SHIPPED | OUTPATIENT
Start: 2023-09-11

## 2023-09-13 ENCOUNTER — TELEMEDICINE (OUTPATIENT)
Dept: PSYCHIATRY | Facility: CLINIC | Age: 63
End: 2023-09-13
Payer: MEDICARE

## 2023-09-13 DIAGNOSIS — F20.0 PARANOID SCHIZOPHRENIA: Primary | Chronic | ICD-10-CM

## 2023-09-13 DIAGNOSIS — F41.1 GENERALIZED ANXIETY DISORDER: Chronic | ICD-10-CM

## 2023-09-13 DIAGNOSIS — F79 INTELLECTUAL DISABILITY: Chronic | ICD-10-CM

## 2023-09-13 PROCEDURE — 1160F RVW MEDS BY RX/DR IN RCRD: CPT | Performed by: PHYSICIAN ASSISTANT

## 2023-09-13 PROCEDURE — 1159F MED LIST DOCD IN RCRD: CPT | Performed by: PHYSICIAN ASSISTANT

## 2023-09-13 PROCEDURE — 99214 OFFICE O/P EST MOD 30 MIN: CPT | Performed by: PHYSICIAN ASSISTANT

## 2023-09-13 NOTE — PROGRESS NOTES
Subjective   Manuel Gaspar is a 63 y.o.white male with MR who [facility] today [facility]    This provider is located in [place], [place] using a secure [employer] Video Visit through [employer]. Patient is being seen remotely via telehealth at their home address in [place], and stated they are in a secure environment for this session. The patient's condition being diagnosed/treated is appropriate for telemedicine. The provider identified herself as well as her credentials.   The patient, and/or patients guardian, consent to be seen remotely, and when consent is given they understand that the consent allows for patient identifiable information to be sent to a third party as needed.   They may refuse to be seen remotely at any time. The electronic data is encrypted and password protected, and the patient and/or guardian has been advised of the potential risks to privacy not withstanding such measures.   PT Identifiers used: Name and .    You have chosen to receive care through a telehealth visit.  Do you consent to use a video/audio connection for your medical care today? Yes      Chief Complaint:  Schizophrenia, Anxiety    History of Present Illness:   He is still working at the work shop 4 days a week, he has new job at the work shop.  His  is with him, he remains stable on Risperdal 2 mg with no behavioral issues.      Dang vaca, PCP, did labs in 2023, including CMP and lipids  He is still on Trileptal and Phenobarb for seizures.  Enjoys playing [employer]   No AVH, no paranoid symptoms  Denies anxiety or depression  No agitation or aggression since he went back up on Risperdal, some paranoia at lower dosage  No SI/HI  Medications are fine, no need for changes.   Sleeps well, grumpy at times    The following portions of the patient's history were reviewed and updated as appropriate: allergies, current medications, past family history, past medical history, past  social history, past surgical history and problem list.    PAST PSYCHIATRIC HISTORY  Axis I  Schizophrenia/cognitive disorder  Axis II  Learning Disorder    PAST OUTPATIENT TREATMENT  Diagnosis treated:  Cognitive Disorder  Treatment Type:  Medication Management  Prior Psychiatric Medications:  Risperdal  Trileptal  Support Groups:  None  Sequelae Of Mental Disorder:  emotional distress      Interval History  No Change    Side Effects  None    Past Psych History was reviewed and compared to 3/8/23 visit and no updates were needed.      Past Medical History:  Past Medical History:   Diagnosis Date    Hearing loss     Hypertension     Kidney stone     Mental retardation     Neurogenic bladder     Seizure disorder     onset 16-17 years old, last seizure about 2015 when weaning phenobarb       Social History:  Social History     Socioeconomic History    Marital status: Single   Tobacco Use    Smoking status: Never     Passive exposure: Never    Smokeless tobacco: Never   Vaping Use    Vaping Use: Never used   Substance and Sexual Activity    Alcohol use: No    Drug use: No    Sexual activity: Never       Family History:  Family History   Family history unknown: Yes       Past Surgical History:  Past Surgical History:   Procedure Laterality Date    BLADDER REPAIR      CATARACT EXTRACTION      ORIF PATELLA FRACTURE Left     THORACOTOMY      TYMPANOMASTOIDECTOMY         Problem List:  Patient Active Problem List   Diagnosis    Allergy status to unspecified drugs, medicaments and biological substances status    Anemia    Constipation    Deafness    Difficult or painful urination    Dysthymia    Encounter for lipid screening for cardiovascular disease    Encounter for screening    Encounter for screening for malignant neoplasm of prostate    Encounter for immunization    Therapeutic drug monitoring    Encounter for immunization    Encounter for general adult medical examination without abnormal findings    Enlarged prostate  without lower urinary tract symptoms (luts)    Generalized anxiety disorder    High cholesterol    Hypertension, benign    Hyponatremia    Knee pain    Intellectual disability    Nephrolithiasis    Otalgia    Otitis media    Partial epilepsy with impairment of consciousness, intractable    Peptic ulcer disease    Schizophrenia    Seizure disorder    Serum creatinine raised    Weight loss, non-intentional    Positive hepatitis C antibody test    BMI 23.0-23.9, adult    Other osteoporosis without current pathological fracture    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    Current Medications:   Current Outpatient Medications   Medication Sig Dispense Refill    acetaminophen (TYLENOL) 325 MG tablet TAKE 2 TABLETS BY MOUTH EVERY 4 HOURS AS NEEDED FOR FEVER AND PAIN ( MAX 12 DOSES IN 24 HOURS) 60 tablet 10    alendronate (FOSAMAX) 70 MG tablet TAKE 1 TABLET BY MOUTH WEEKLY .TAKE WITH 8OZ WATER BEFORE ANY FOOD OR DRINK. DO NOT LIE DOWN FOR 30 MINUTES 4 tablet 0    Allergy 4 MG tablet TAKE 1 TABLET BY MOUTH 4 TO 6 HOURS AS NEEDED FOR ALLERGIES AND RUNNY NOSE 24 tablet 0    brimonidine-timolol (COMBIGAN) 0.2-0.5 % ophthalmic solution Administer 1 drop to both eyes 2 (Two) Times a Day.      Calcium + Vitamin D3 600-10 MG-MCG tablet per tablet TAKE 1 TABLET BY MOUTH TWICE DAILY 60 tablet 0    cephalexin (Keflex) 500 MG capsule Take 1 capsule by mouth 2 (Two) Times a Day. 20 capsule 0    chlorhexidine (PERIDEX) 0.12 % solution SWISH AND SPIT WITH A 1/2 OUNCE TWICE DAILY 946 mL 0    Colloidal Oatmeal (Aveeno Eczema Therapy) 1 % cream Apply to dry skin 3 times a day 354 each 5    DOCUSIL 100 MG capsule TAKE ONE (1) CAPSULE BY MOUTH TWICE A DAY 60 capsule 3    famotidine (PEPCID) 20 MG tablet TAKE 1 TABLET  BY MOUTH TWICE DAILY 60 tablet 5    fenofibrate (TRICOR) 48 MG tablet TAKE 1 TABLET BY MOUTH AT BEDTIME 30 tablet 0    FeroSul 325 (65 Fe) MG tablet TAKE 1 TABLET BY MOUTH TWICE DAILY 60 tablet 0    GNP Antacid Regular Strength 200-200-20 MG/5ML suspension       GNP Milk of Magnesia 1200 MG/15ML suspension       hydrocortisone 2.5 % cream       lisinopril (PRINIVIL,ZESTRIL) 5 MG tablet 5mg daily if b/p > 150/80 can give another 5 mg 60 tablet 2    loperamide (IMODIUM) 2 MG capsule       loratadine (CLARITIN) 10 MG tablet TAKE 1 TABLET BY MOUTH EVERY DAY 30 tablet 0    mupirocin (BACTROBAN) 2 % ointment APPLY TO RIGHT 5TH TOE TWICE DAILY      Neomycin-Bacitracin-Polymyxin (GNP TRIPLE ANTIBIOTIC) 3.5-400-5000 ointment FOLLOW DIRECTIONS ON LABEL AS NEEDED TO PREVENT INFECTIONS IN MINOR CUTS AND SCRAPES. IF NO IMPROVEMENT IN 48 HOURS NOTIFY NURSE 28.4 g 12    omeprazole (priLOSEC) 20 MG capsule TAKE 1 CAPSULE BY MOUTH EVERY DAY AS NEEDED ( BUBBLE ) 30 capsule 0    OXcarbazepine (TRILEPTAL) 600 MG tablet One tablet in am and two each pm 90 tablet 11    PHENobarbital (LUMINAL) 97.2 MG tablet TAKE 1 TABLET BY MOUTH EVERY NIGHT AT BEDTIME (NARC SHEET) 30 tablet 4    polyethylene glycol (MiraLax) 17 GM/SCOOP powder Take 17 g by mouth Daily. Mix with water 17g daily with 8oz water prn 1 each 2    Psyllium (Metamucil [employer] Fiber) 55.46 % powder Take 1 teaspoon(s) by mouth 2 (Two) Times a Day. 1 tsp 1-2 x day as needed 1 g 1    risperiDONE (risperDAL) 2 MG tablet TAKE 1 TABLET BY MOUTH TWICE DAILY 60 tablet 3    Robafen 100 MG/5ML liquid       tamsulosin (FLOMAX) 0.4 MG capsule 24 hr capsule Take 1 capsule by mouth Daily.      tolnaftate (TINACTIN) 1 % external solution Apply twice a day to affected area until resolved 29.5 mL 2    white petrolatum gel gel Apply 1 application topically to the appropriate area as directed As Needed (Dry skin). Apply to hands at night and cover with cotton gloves overnight 212 g 2     No  current facility-administered medications for this visit.         Review of Symptoms:    Psychiatric/Behavioral: Negative for agitation, behavioral problems, confusion, decreased concentration, dysphoric mood, hallucinations, self-injury, sleep disturbance and suicidal ideas. The patient is not nervous/anxious and is not hyperactive.        Physical Exam:   There were no vitals taken for this visit.    Mental Status Exam:   Hygiene:   Good  Cooperation:  Cooperative  Eye Contact:  Good  Psychomotor Behavior:  Slow  Affect:  Appropriate  Mood: normal  Hopelessness: Denies  Speech:  Normal  Thought Process:  Goal directed  Thought Content:  Normal  Suicidal:  None  Homicidal:  None  Hallucinations:  None  Delusion:  None  Memory:  Deficits  Orientation:  Person, Place, Time and Situation  Reliability:  fair  Insight:  Fair  Judgement:  Fair  Impulse Control:  Good  Physical/Medical Issues:  No      Mental Status Exam was reviewed and compared to 3/8/23 visit and no updates were needed, the exam was the same.        PHQ-9 Depression Screening  Little interest or pleasure in doing things? (P) 3-->nearly every day   Feeling down, depressed, or hopeless? (P) 0-->not at all   Trouble falling or staying asleep, or sleeping too much? (P) 0-->not at all   Feeling tired or having little energy? (P) 0-->not at all   Poor appetite or overeating? (P) 0-->not at all   Feeling bad about yourself - or that you are a failure or have let yourself or your family down? (P) 0-->not at all   Trouble concentrating on things, such as reading the newspaper or watching television? (P) 0-->not at all   Moving or speaking so slowly that other people could have noticed? Or the opposite - being so fidgety or restless that you have been moving around a lot more than usual? (P) 0-->not at all   Thoughts that you would be better off dead, or of hurting yourself in some way? (P) 0-->not at all   PHQ-9 Total Score (P) 3   If you checked off any  problems, how difficult have these problems made it for you to do your work, take care of things at home, or get along with other people? (P) not difficult at all           Never smoker    I advised Ray of the risks of tobacco use.     Lab Results:   Office Visit on 07/05/2023   Component Date Value Ref Range Status    WBC 07/05/2023 5.7  3.4 - 10.8 x10E3/uL Final    RBC 07/05/2023 3.75 (L)  4.14 - 5.80 x10E6/uL Final    Hemoglobin 07/05/2023 12.3 (L)  13.0 - 17.7 g/dL Final    Hematocrit 07/05/2023 36.8 (L)  37.5 - 51.0 % Final    MCV 07/05/2023 98 (H)  79 - 97 fL Final    MCH 07/05/2023 32.8  26.6 - 33.0 pg Final    MCHC 07/05/2023 33.4  31.5 - 35.7 g/dL Final    RDW 07/05/2023 11.7  11.6 - 15.4 % Final    Platelets 07/05/2023 248  150 - 450 x10E3/uL Final    Neutrophil Rel % 07/05/2023 66  Not Estab. % Final    Lymphocyte Rel % 07/05/2023 16  Not Estab. % Final    Monocyte Rel % 07/05/2023 11  Not Estab. % Final    Eosinophil Rel % 07/05/2023 5  Not Estab. % Final    Basophil Rel % 07/05/2023 1  Not Estab. % Final    Neutrophils Absolute 07/05/2023 3.9  1.4 - 7.0 x10E3/uL Final    Lymphocytes Absolute 07/05/2023 0.9  0.7 - 3.1 x10E3/uL Final    Monocytes Absolute 07/05/2023 0.6  0.1 - 0.9 x10E3/uL Final    Eosinophils Absolute 07/05/2023 0.3  0.0 - 0.4 x10E3/uL Final    Basophils Absolute 07/05/2023 0.1  0.0 - 0.2 x10E3/uL Final    Immature Granulocyte Rel % 07/05/2023 1  Not Estab. % Final    Immature Grans Absolute 07/05/2023 0.0  0.0 - 0.1 x10E3/uL Final    Glucose 07/05/2023 93  70 - 99 mg/dL Final    BUN 07/05/2023 27  8 - 27 mg/dL Final    Creatinine 07/05/2023 1.34 (H)  0.76 - 1.27 mg/dL Final    EGFR Result 07/05/2023 59 (L)  >59 mL/min/1.73 Final    BUN/Creatinine Ratio 07/05/2023 20  10 - 24 Final    Sodium 07/05/2023 137  134 - 144 mmol/L Final    Potassium 07/05/2023 4.7  3.5 - 5.2 mmol/L Final    Chloride 07/05/2023 100  96 - 106 mmol/L Final    Total CO2 07/05/2023 23  20 - 29 mmol/L Final     Calcium 07/05/2023 10.3 (H)  8.6 - 10.2 mg/dL Final    Total Protein 07/05/2023 7.0  6.0 - 8.5 g/dL Final    Albumin 07/05/2023 4.1  3.8 - 4.8 g/dL Final    Comment:                **Effective July 10, 2023 Albumin reference interval**                   will be changing to:                              Age                  Male          Female                             0 -   7 days       3.6 - 4.9      3.6 - 4.9                             8 -  30 days       3.5 - 4.6      3.5 - 4.6                             1 -   6 months     3.7 - 4.8      3.7 - 4.8                      7 months -   2 years      4.0 - 5.0      4.0 - 5.0                             3 -   5 years      4.1 - 5.0      4.1 - 5.0                             6 -  12 years      4.2 - 5.0      4.2 - 5.0                            13 -  30 years      4.3 - 5.2      4.0 - 5.0                            31 -  50 years      4.1 - 5.1      3.9 - 4.9                            51 -  60 years      3.8 - 4.9      3.8 - 4.9                            61 -  70 years      3.9 - 4.9      3.9 - 4.9                            71 -  80 years      3.8 - 4.8      3.8 - 4.8                            8                           1 -  89 years      3.7 - 4.7      3.7 - 4.7                            90 - 199 years      3.6 - 4.6      3.6 - 4.6      Globulin 07/05/2023 2.9  1.5 - 4.5 g/dL Final    A/G Ratio 07/05/2023 1.4  1.2 - 2.2 Final    Total Bilirubin 07/05/2023 0.2  0.0 - 1.2 mg/dL Final    Alkaline Phosphatase 07/05/2023 61  44 - 121 IU/L Final    AST (SGOT) 07/05/2023 16  0 - 40 IU/L Final    ALT (SGPT) 07/05/2023 16  0 - 44 IU/L Final    Total Cholesterol 07/05/2023 224 (H)  100 - 199 mg/dL Final    Triglycerides 07/05/2023 142  0 - 149 mg/dL Final    HDL Cholesterol 07/05/2023 59  >39 mg/dL Final    VLDL Cholesterol Damon 07/05/2023 25  5 - 40 mg/dL Final    LDL Chol Calc (NIH) 07/05/2023 140 (H)  0 - 99 mg/dL Final    LDL/HDL RATIO 07/05/2023 2.4  0.0 - 3.6 ratio  Final    Comment:                                     LDL/HDL Ratio                                              Men  Women                                1/2 Avg.Risk  1.0    1.5                                    Avg.Risk  3.6    3.2                                 2X Avg.Risk  6.2    5.0                                 3X Avg.Risk  8.0    6.1      Specific Gravity, UA 07/05/2023 1.015  1.005 - 1.030 Final    pH, UA 07/05/2023 8.0 (H)  5.0 - 7.5 Final    Color, UA 07/05/2023 Yellow  Yellow Final    Appearance, UA 07/05/2023 Cloudy (A)  Clear Final    Leukocytes, UA 07/05/2023 3+ (A)  Negative Final    Protein 07/05/2023 Trace  Negative/Trace Final    Glucose, UA 07/05/2023 Negative  Negative Final    Ketones 07/05/2023 Negative  Negative Final    Blood, UA 07/05/2023 Negative  Negative Final    Bilirubin, UA 07/05/2023 Negative  Negative Final    Urobilinogen, UA 07/05/2023 0.2  0.2 - 1.0 mg/dL Final    Nitrite, UA 07/05/2023 Negative  Negative Final    Microscopic Examination 07/05/2023 See below:   Final    Microscopic was indicated and was performed.    Urinalysis Reflex 07/05/2023 Comment   Final    This specimen has reflexed to a Urine Culture.    WBC, UA 07/05/2023 11-30 (A)  0 - 5 /hpf Final    RBC, UA 07/05/2023 None seen  0 - 2 /hpf Final    Epithelial Cells (non renal) 07/05/2023 0-10  0 - 10 /hpf Final    Casts 07/05/2023 None seen  None seen /lpf Final    Crystals, UA 07/05/2023 Present (A)  N/A Final    Crystal Type 07/05/2023 Amorphous Sediment  N/A Final    Triple Phosphate    Mucus, UA 07/05/2023 Present  Not Estab. Final    Bacteria, UA 07/05/2023 Few  None seen/Few Final    Urine Culture 07/05/2023 Final report   Final    Result 1 07/05/2023 Comment   Final    Comment: Mixed urogenital klever  5,000  Colonies/mL         Assessment & Plan   Problems Addressed this Visit          Mental Health    Generalized anxiety disorder (Chronic)    Schizophrenia - Primary (Chronic)       Neuro    Intellectual  disability (Chronic)     Diagnoses         Codes Comments    Paranoid schizophrenia    -  Primary ICD-10-CM: F20.0  ICD-9-CM: 295.30     Generalized anxiety disorder     ICD-10-CM: F41.1  ICD-9-CM: 300.02     Intellectual disability     ICD-10-CM: F79  ICD-9-CM: 319             Visit Diagnoses:    ICD-10-CM ICD-9-CM   1. Paranoid schizophrenia  F20.0 295.30   2. Generalized anxiety disorder  F41.1 300.02   3. Intellectual disability  F79 319         TREATMENT PLAN/GOALS: Continue supportive psychotherapy efforts and medications as indicated. Treatment and medication options discussed during today's visit. Patient ackowledged and verbally consented to continue with current treatment plan and was educated on the importance of compliance with treatment and follow-up appointments.    MEDICATION ISSUES:  INSPECT reviewed as expected  Discussed medication options and treatment plan of prescribed medication as well as the risks, benefits, and side effects including potential falls, possible impaired driving and metabolic adversities among others. Patient is agreeable to call the office with any worsening of symptoms or onset of side effects. Patient is agreeable to call 911 or go to the nearest ER should he/she begin having SI/HI. No medication side effects or related complaints today.     Patient continues to do well on Risperdal, did not do well with dosage reduction and back up to 2 mg tabs.    Continue Risperdal 2 mg BID   Continue Trileptal prescribed by neurology for seizures  Bloodwork done by Dang Holder    [facility] ORDERED DURING VISIT:  No orders of the defined types were placed in this encounter.      Return in about 6 months (around 3/13/2024) for video visit.      This document has been electronically signed by Krista Epstein PA-C  September 13, 2023 08:51 EDT

## 2023-10-06 DIAGNOSIS — F20.0 PARANOID SCHIZOPHRENIA: ICD-10-CM

## 2023-10-06 RX ORDER — ALENDRONATE SODIUM 70 MG/1
TABLET ORAL
Qty: 4 TABLET | Refills: 0 | Status: SHIPPED | OUTPATIENT
Start: 2023-10-06

## 2023-10-06 RX ORDER — FENOFIBRATE 48 MG/1
TABLET, COATED ORAL
Qty: 30 TABLET | Refills: 0 | Status: SHIPPED | OUTPATIENT
Start: 2023-10-06

## 2023-10-06 RX ORDER — CALCIUM CARBONATE/VITAMIN D3 600 MG-10
TABLET ORAL
Qty: 60 TABLET | Refills: 0 | Status: SHIPPED | OUTPATIENT
Start: 2023-10-06

## 2023-10-06 RX ORDER — LORATADINE 10 MG/1
TABLET ORAL
Qty: 30 TABLET | Refills: 0 | Status: SHIPPED | OUTPATIENT
Start: 2023-10-06

## 2023-10-06 RX ORDER — FERROUS SULFATE 325(65) MG
TABLET ORAL
Qty: 60 TABLET | Refills: 0 | Status: SHIPPED | OUTPATIENT
Start: 2023-10-06

## 2023-10-07 RX ORDER — RISPERIDONE 2 MG/1
TABLET ORAL
Qty: 60 TABLET | Refills: 5 | Status: SHIPPED | OUTPATIENT
Start: 2023-10-07

## 2023-10-17 RX ORDER — CHLORHEXIDINE GLUCONATE ORAL RINSE 1.2 MG/ML
SOLUTION DENTAL
Qty: 946 ML | Refills: 0 | Status: SHIPPED | OUTPATIENT
Start: 2023-10-17

## 2023-11-02 ENCOUNTER — OFFICE VISIT (OUTPATIENT)
Dept: FAMILY MEDICINE CLINIC | Facility: CLINIC | Age: 63
End: 2023-11-02
Payer: MEDICARE

## 2023-11-02 VITALS
HEIGHT: 68 IN | BODY MASS INDEX: 23.79 KG/M2 | WEIGHT: 157 LBS | OXYGEN SATURATION: 100 % | DIASTOLIC BLOOD PRESSURE: 68 MMHG | HEART RATE: 64 BPM | TEMPERATURE: 97.3 F | SYSTOLIC BLOOD PRESSURE: 108 MMHG

## 2023-11-02 DIAGNOSIS — N30.01 ACUTE CYSTITIS WITH HEMATURIA: ICD-10-CM

## 2023-11-02 DIAGNOSIS — R30.9 PAINFUL URINATION: ICD-10-CM

## 2023-11-02 DIAGNOSIS — R79.89 SERUM CREATININE RAISED: Primary | ICD-10-CM

## 2023-11-02 LAB
BILIRUB BLD-MCNC: NEGATIVE MG/DL
CLARITY, POC: CLEAR
COLOR UR: YELLOW
EXPIRATION DATE: ABNORMAL
GLUCOSE UR STRIP-MCNC: NEGATIVE MG/DL
KETONES UR QL: NEGATIVE
LEUKOCYTE EST, POC: ABNORMAL
Lab: ABNORMAL
NITRITE UR-MCNC: NEGATIVE MG/ML
PH UR: 7.5 [PH] (ref 5–8)
PROT UR STRIP-MCNC: NEGATIVE MG/DL
RBC # UR STRIP: ABNORMAL /UL
SP GR UR: 1.01 (ref 1–1.03)
UROBILINOGEN UR QL: NORMAL

## 2023-11-02 PROCEDURE — 99213 OFFICE O/P EST LOW 20 MIN: CPT | Performed by: NURSE PRACTITIONER

## 2023-11-02 PROCEDURE — 3074F SYST BP LT 130 MM HG: CPT | Performed by: NURSE PRACTITIONER

## 2023-11-02 PROCEDURE — 3078F DIAST BP <80 MM HG: CPT | Performed by: NURSE PRACTITIONER

## 2023-11-02 PROCEDURE — 81003 URINALYSIS AUTO W/O SCOPE: CPT | Performed by: NURSE PRACTITIONER

## 2023-11-02 RX ORDER — CEPHALEXIN 500 MG/1
500 CAPSULE ORAL 2 TIMES DAILY
Qty: 20 CAPSULE | Refills: 0 | Status: SHIPPED | OUTPATIENT
Start: 2023-11-02

## 2023-11-02 NOTE — PROGRESS NOTES
"    Manuel Gaspar is a 63 y.o. male.     History of Present Illness  63-year-old white male lives in a group home with history of MR, anxiety, schizophrenia, osteoporosis, hypertension, kidney stones, chronic anemia, neurogenic bladder, hearing loss, chronic hyponatremia, chronic UTIs and seizure disorder who comes in today for 3-month visit    Blood pressure 108/68 heart rate 64 he denies any chest pain, dyspnea, tachycardia or dizziness    Patient's last creatinine was 1.34 hemoglobin 12.3.  We will be checking kidney function today    Patient gets frequent urinary tract infections without symptoms.  Urinalysis did show today 3+ leukocytes 3+ blood.  We will place him on antibiotics he does see urology    Weight is up 5 pounds at 157 with a BMI of 23.9.  He is up-to-date on immunizations eye exams PSA is through urology and colonoscopies are done through Dr. Valentine          Urine/urine culture  Blood work today  Keflex 500 mg twice daily x10 days  Follow-up urine in  5 days after last antibiotic dose  Follow-up with urology  Monitor weight  Follow-up 3 months       The following portions of the patient's history were reviewed and updated as appropriate: allergies, current medications, past family history, past medical history, past social history, past surgical history, and problem list.    Vitals:    11/02/23 0813   BP: 108/68   BP Location: Right arm   Patient Position: Sitting   Cuff Size: Adult   Pulse: 64   Temp: 97.3 °F (36.3 °C)   TempSrc: Infrared   SpO2: 100%   Weight: 71.2 kg (157 lb)   Height: 172.7 cm (67.99\")     Body mass index is 23.88 kg/m².    Past Medical History:   Diagnosis Date    Hearing loss     Hypertension     Kidney stone     Mental retardation     Neurogenic bladder     Seizure disorder     onset 16-17 years old, last seizure about 2015 when weaning phenobarb     Past Surgical History:   Procedure Laterality Date    BLADDER REPAIR      CATARACT EXTRACTION      ORIF PATELLA FRACTURE Left     " THORACOTOMY      TYMPANOMASTOIDECTOMY       Family History   Family history unknown: Yes     Immunization History   Administered Date(s) Administered    COVID-19 (MODERNA) 1st,2nd,3rd Dose Monovalent 01/11/2021, 02/08/2021, 10/17/2021    Flu Vaccine Intradermal Quad 18-64YR 10/05/2018, 11/19/2021    Flu Vaccine Quad PF >36MO 11/23/2016, 11/12/2019    Fluzone (or Fluarix & Flulaval for VFC) >6mos 10/21/2020    Fluzone Quad >6mos (Multi-dose) 10/06/2015    Td (TDVAX) 06/13/2014       Office Visit on 07/05/2023   Component Date Value Ref Range Status    WBC 07/05/2023 5.7  3.4 - 10.8 x10E3/uL Final    RBC 07/05/2023 3.75 (L)  4.14 - 5.80 x10E6/uL Final    Hemoglobin 07/05/2023 12.3 (L)  13.0 - 17.7 g/dL Final    Hematocrit 07/05/2023 36.8 (L)  37.5 - 51.0 % Final    MCV 07/05/2023 98 (H)  79 - 97 fL Final    MCH 07/05/2023 32.8  26.6 - 33.0 pg Final    MCHC 07/05/2023 33.4  31.5 - 35.7 g/dL Final    RDW 07/05/2023 11.7  11.6 - 15.4 % Final    Platelets 07/05/2023 248  150 - 450 x10E3/uL Final    Neutrophil Rel % 07/05/2023 66  Not Estab. % Final    Lymphocyte Rel % 07/05/2023 16  Not Estab. % Final    Monocyte Rel % 07/05/2023 11  Not Estab. % Final    Eosinophil Rel % 07/05/2023 5  Not Estab. % Final    Basophil Rel % 07/05/2023 1  Not Estab. % Final    Neutrophils Absolute 07/05/2023 3.9  1.4 - 7.0 x10E3/uL Final    Lymphocytes Absolute 07/05/2023 0.9  0.7 - 3.1 x10E3/uL Final    Monocytes Absolute 07/05/2023 0.6  0.1 - 0.9 x10E3/uL Final    Eosinophils Absolute 07/05/2023 0.3  0.0 - 0.4 x10E3/uL Final    Basophils Absolute 07/05/2023 0.1  0.0 - 0.2 x10E3/uL Final    Immature Granulocyte Rel % 07/05/2023 1  Not Estab. % Final    Immature Grans Absolute 07/05/2023 0.0  0.0 - 0.1 x10E3/uL Final    Glucose 07/05/2023 93  70 - 99 mg/dL Final    BUN 07/05/2023 27  8 - 27 mg/dL Final    Creatinine 07/05/2023 1.34 (H)  0.76 - 1.27 mg/dL Final    EGFR Result 07/05/2023 59 (L)  >59 mL/min/1.73 Final    BUN/Creatinine Ratio  07/05/2023 20  10 - 24 Final    Sodium 07/05/2023 137  134 - 144 mmol/L Final    Potassium 07/05/2023 4.7  3.5 - 5.2 mmol/L Final    Chloride 07/05/2023 100  96 - 106 mmol/L Final    Total CO2 07/05/2023 23  20 - 29 mmol/L Final    Calcium 07/05/2023 10.3 (H)  8.6 - 10.2 mg/dL Final    Total Protein 07/05/2023 7.0  6.0 - 8.5 g/dL Final    Albumin 07/05/2023 4.1  3.8 - 4.8 g/dL Final    Comment:                **Effective July 10, 2023 Albumin reference interval**                   will be changing to:                              Age                  Male          Female                             0 -   7 days       3.6 - 4.9      3.6 - 4.9                             8 -  30 days       3.5 - 4.6      3.5 - 4.6                             1 -   6 months     3.7 - 4.8      3.7 - 4.8                      7 months -   2 years      4.0 - 5.0      4.0 - 5.0                             3 -   5 years      4.1 - 5.0      4.1 - 5.0                             6 -  12 years      4.2 - 5.0      4.2 - 5.0                            13 -  30 years      4.3 - 5.2      4.0 - 5.0                            31 -  50 years      4.1 - 5.1      3.9 - 4.9                            51 -  60 years      3.8 - 4.9      3.8 - 4.9                            61 -  70 years      3.9 - 4.9      3.9 - 4.9                            71 -  80 years      3.8 - 4.8      3.8 - 4.8                            8                           1 -  89 years      3.7 - 4.7      3.7 - 4.7                            90 - 199 years      3.6 - 4.6      3.6 - 4.6      Globulin 07/05/2023 2.9  1.5 - 4.5 g/dL Final    A/G Ratio 07/05/2023 1.4  1.2 - 2.2 Final    Total Bilirubin 07/05/2023 0.2  0.0 - 1.2 mg/dL Final    Alkaline Phosphatase 07/05/2023 61  44 - 121 IU/L Final    AST (SGOT) 07/05/2023 16  0 - 40 IU/L Final    ALT (SGPT) 07/05/2023 16  0 - 44 IU/L Final    Total Cholesterol 07/05/2023 224 (H)  100 - 199 mg/dL Final    Triglycerides 07/05/2023 142  0 -  149 mg/dL Final    HDL Cholesterol 07/05/2023 59  >39 mg/dL Final    VLDL Cholesterol Damon 07/05/2023 25  5 - 40 mg/dL Final    LDL Chol Calc (NIH) 07/05/2023 140 (H)  0 - 99 mg/dL Final    LDL/HDL RATIO 07/05/2023 2.4  0.0 - 3.6 ratio Final    Comment:                                     LDL/HDL Ratio                                              Men  Women                                1/2 Avg.Risk  1.0    1.5                                    Avg.Risk  3.6    3.2                                 2X Avg.Risk  6.2    5.0                                 3X Avg.Risk  8.0    6.1      Specific Gravity, UA 07/05/2023 1.015  1.005 - 1.030 Final    pH, UA 07/05/2023 8.0 (H)  5.0 - 7.5 Final    Color, UA 07/05/2023 Yellow  Yellow Final    Appearance, UA 07/05/2023 Cloudy (A)  Clear Final    Leukocytes, UA 07/05/2023 3+ (A)  Negative Final    Protein 07/05/2023 Trace  Negative/Trace Final    Glucose, UA 07/05/2023 Negative  Negative Final    Ketones 07/05/2023 Negative  Negative Final    Blood, UA 07/05/2023 Negative  Negative Final    Bilirubin, UA 07/05/2023 Negative  Negative Final    Urobilinogen, UA 07/05/2023 0.2  0.2 - 1.0 mg/dL Final    Nitrite, UA 07/05/2023 Negative  Negative Final    Microscopic Examination 07/05/2023 See below:   Final    Microscopic was indicated and was performed.    Urinalysis Reflex 07/05/2023 Comment   Final    This specimen has reflexed to a Urine Culture.    WBC, UA 07/05/2023 11-30 (A)  0 - 5 /hpf Final    RBC, UA 07/05/2023 None seen  0 - 2 /hpf Final    Epithelial Cells (non renal) 07/05/2023 0-10  0 - 10 /hpf Final    Casts 07/05/2023 None seen  None seen /lpf Final    Crystals, UA 07/05/2023 Present (A)  N/A Final    Crystal Type 07/05/2023 Amorphous Sediment  N/A Final    Triple Phosphate    Mucus, UA 07/05/2023 Present  Not Estab. Final    Bacteria, UA 07/05/2023 Few  None seen/Few Final    Urine Culture 07/05/2023 Final report   Final    Result 1 07/05/2023 Comment   Final     Comment: Mixed urogenital klever  5,000  Colonies/mL           Review of Systems   Constitutional: Negative.    HENT: Negative.     Respiratory: Negative.     Cardiovascular: Negative.    Gastrointestinal: Negative.    Genitourinary: Negative.    Musculoskeletal: Negative.    Skin: Negative.    Neurological: Negative.    Psychiatric/Behavioral: Negative.         Objective   Physical Exam  Constitutional:       Appearance: Normal appearance.   HENT:      Head: Normocephalic.   Cardiovascular:      Rate and Rhythm: Normal rate and regular rhythm.      Pulses: Normal pulses.      Heart sounds: Normal heart sounds.   Pulmonary:      Effort: Pulmonary effort is normal.      Breath sounds: Normal breath sounds.   Abdominal:      General: Bowel sounds are normal.   Musculoskeletal:         General: Normal range of motion.   Skin:     General: Skin is warm.   Neurological:      General: No focal deficit present.      Mental Status: He is alert and oriented to person, place, and time.   Psychiatric:         Mood and Affect: Mood normal.         Behavior: Behavior normal.         Procedures    Assessment & Plan   Diagnoses and all orders for this visit:    1. Serum creatinine raised (Primary)  -     Comprehensive Metabolic Panel    2. Painful urination  -     POC Urinalysis Dipstick, Automated  -     Urine Culture - Urine, Urine, Clean Catch; Future  -     Urine Culture - Urine, Urine, Clean Catch    3. BMI 23.0-23.9, adult    4. Acute cystitis with hematuria    Other orders  -     cephalexin (Keflex) 500 MG capsule; Take 1 capsule by mouth 2 (Two) Times a Day.  Dispense: 20 capsule; Refill: 0           Current Outpatient Medications:     acetaminophen (TYLENOL) 325 MG tablet, TAKE 2 TABLETS BY MOUTH EVERY 4 HOURS AS NEEDED FOR FEVER AND PAIN ( MAX 12 DOSES IN 24 HOURS), Disp: 60 tablet, Rfl: 10    alendronate (FOSAMAX) 70 MG tablet, TAKE 1 TABLET BY MOUTH WEEKLY .TAKE WITH 8OZ WATER BEFORE ANY FOOD OR DRINK. DO NOT LIE DOWN  FOR 30 MINUTES, Disp: 4 tablet, Rfl: 0    Allergy 4 MG tablet, TAKE 1 TABLET BY MOUTH 4 TO 6 HOURS AS NEEDED FOR ALLERGIES AND RUNNY NOSE, Disp: 24 tablet, Rfl: 0    brimonidine-timolol (COMBIGAN) 0.2-0.5 % ophthalmic solution, Administer 1 drop to both eyes 2 (Two) Times a Day., Disp: , Rfl:     Calcium + Vitamin D3 600-10 MG-MCG tablet per tablet, TAKE 1 TABLET BY MOUTH TWICE DAILY, Disp: 60 tablet, Rfl: 0    chlorhexidine (PERIDEX) 0.12 % solution, SWISH AND SPIT WITH A 1/2 OUNCE TWICE DAILY, Disp: 946 mL, Rfl: 0    Colloidal Oatmeal (Aveeno Eczema Therapy) 1 % cream, Apply to dry skin 3 times a day, Disp: 354 each, Rfl: 5    DOCUSIL 100 MG capsule, TAKE ONE (1) CAPSULE BY MOUTH TWICE A DAY, Disp: 60 capsule, Rfl: 3    famotidine (PEPCID) 20 MG tablet, TAKE 1 TABLET BY MOUTH TWICE DAILY, Disp: 60 tablet, Rfl: 5    fenofibrate (TRICOR) 48 MG tablet, TAKE 1 TABLET BY MOUTH AT BEDTIME, Disp: 30 tablet, Rfl: 0    FeroSul 325 (65 Fe) MG tablet, TAKE 1 TABLET BY MOUTH TWICE DAILY, Disp: 60 tablet, Rfl: 0    GNP Antacid Regular Strength 200-200-20 MG/5ML suspension, , Disp: , Rfl:     GNP Milk of Magnesia 1200 MG/15ML suspension, , Disp: , Rfl:     hydrocortisone 2.5 % cream, , Disp: , Rfl:     lisinopril (PRINIVIL,ZESTRIL) 5 MG tablet, 5mg daily if b/p > 150/80 can give another 5 mg, Disp: 60 tablet, Rfl: 2    loperamide (IMODIUM) 2 MG capsule, , Disp: , Rfl:     loratadine (CLARITIN) 10 MG tablet, TAKE 1 TABLET BY MOUTH EVERY DAY, Disp: 30 tablet, Rfl: 0    mupirocin (BACTROBAN) 2 % ointment, APPLY TO RIGHT 5TH TOE TWICE DAILY, Disp: , Rfl:     Neomycin-Bacitracin-Polymyxin (GNP TRIPLE ANTIBIOTIC) 3.5-400-5000 ointment, FOLLOW DIRECTIONS ON LABEL AS NEEDED TO PREVENT INFECTIONS IN MINOR CUTS AND SCRAPES. IF NO IMPROVEMENT IN 48 HOURS NOTIFY NURSE, Disp: 28.4 g, Rfl: 12    omeprazole (priLOSEC) 20 MG capsule, TAKE 1 CAPSULE BY MOUTH EVERY DAY AS NEEDED ( BUBBLE ), Disp: 30 capsule, Rfl: 0    OXcarbazepine (TRILEPTAL)  600 MG tablet, One tablet in am and two each pm, Disp: 90 tablet, Rfl: 11    PHENobarbital (LUMINAL) 97.2 MG tablet, TAKE 1 TABLET BY MOUTH EVERY NIGHT AT BEDTIME (NARC SHEET), Disp: 30 tablet, Rfl: 4    polyethylene glycol (MiraLax) 17 GM/SCOOP powder, Take 17 g by mouth Daily. Mix with water 17g daily with 8oz water prn, Disp: 1 each, Rfl: 2    Psyllium (Metamucil [facility] Fiber) 55.46 % powder, Take 1 teaspoon(s) by mouth 2 (Two) Times a Day. 1 tsp 1-2 x day as needed, Disp: 1 g, Rfl: 1    risperiDONE (risperDAL) 2 MG tablet, TAKE 1 TABLET BY MOUTH TWICE DAILY, Disp: 60 tablet, Rfl: 5    Robafen 100 MG/5ML liquid, , Disp: , Rfl:     tamsulosin (FLOMAX) 0.4 MG capsule 24 hr capsule, Take 1 capsule by mouth Daily., Disp: , Rfl:     tolnaftate (TINACTIN) 1 % external solution, Apply twice a day to affected area until resolved, Disp: 29.5 mL, Rfl: 2    white petrolatum gel gel, Apply 1 application topically to the appropriate area as directed As Needed (Dry skin). Apply to hands at night and cover with cotton gloves overnight, Disp: 212 g, Rfl: 2    cephalexin (Keflex) 500 MG capsule, Take 1 capsule by mouth 2 (Two) Times a Day., Disp: 20 capsule, Rfl: 0           HORTENCIA Valentine 11/2/2023 09:20 EDT  This note has been electronically signed

## 2023-11-02 NOTE — PATIENT INSTRUCTIONS
Urine/urine culture  Blood work today  Keflex 500 mg twice daily x10 days  Follow-up urine in  5 days after last antibiotic dose  Follow-up with urology  Monitor weight  Follow-up 3 months

## 2023-11-03 LAB
ALBUMIN SERPL-MCNC: 4.2 G/DL (ref 3.9–4.9)
ALBUMIN/GLOB SERPL: 1.8 {RATIO} (ref 1.2–2.2)
ALP SERPL-CCNC: 60 IU/L (ref 44–121)
ALT SERPL-CCNC: 16 IU/L (ref 0–44)
AST SERPL-CCNC: 17 IU/L (ref 0–40)
BILIRUB SERPL-MCNC: <0.2 MG/DL (ref 0–1.2)
BUN SERPL-MCNC: 28 MG/DL (ref 8–27)
BUN/CREAT SERPL: 21 (ref 10–24)
CALCIUM SERPL-MCNC: 9.8 MG/DL (ref 8.6–10.2)
CHLORIDE SERPL-SCNC: 94 MMOL/L (ref 96–106)
CO2 SERPL-SCNC: 25 MMOL/L (ref 20–29)
CREAT SERPL-MCNC: 1.32 MG/DL (ref 0.76–1.27)
EGFRCR SERPLBLD CKD-EPI 2021: 60 ML/MIN/1.73
GLOBULIN SER CALC-MCNC: 2.4 G/DL (ref 1.5–4.5)
GLUCOSE SERPL-MCNC: 62 MG/DL (ref 70–99)
POTASSIUM SERPL-SCNC: 4.2 MMOL/L (ref 3.5–5.2)
PROT SERPL-MCNC: 6.6 G/DL (ref 6–8.5)
SODIUM SERPL-SCNC: 132 MMOL/L (ref 134–144)

## 2023-11-04 LAB
BACTERIA UR CULT: NORMAL
BACTERIA UR CULT: NORMAL

## 2023-11-04 RX ORDER — CALCIUM CARBONATE/VITAMIN D3 600 MG-10
TABLET ORAL
Qty: 60 TABLET | Refills: 0 | Status: SHIPPED | OUTPATIENT
Start: 2023-11-04

## 2023-11-04 RX ORDER — FENOFIBRATE 48 MG/1
TABLET, COATED ORAL
Qty: 30 TABLET | Refills: 0 | Status: SHIPPED | OUTPATIENT
Start: 2023-11-04

## 2023-11-04 RX ORDER — LORATADINE 10 MG/1
TABLET ORAL
Qty: 30 TABLET | Refills: 0 | Status: SHIPPED | OUTPATIENT
Start: 2023-11-04

## 2023-11-04 RX ORDER — LISINOPRIL 5 MG/1
TABLET ORAL
Qty: 60 TABLET | Refills: 1 | Status: SHIPPED | OUTPATIENT
Start: 2023-11-04

## 2023-11-04 RX ORDER — ALENDRONATE SODIUM 70 MG/1
TABLET ORAL
Qty: 4 TABLET | Refills: 0 | Status: SHIPPED | OUTPATIENT
Start: 2023-11-04

## 2023-11-04 RX ORDER — FERROUS SULFATE 325(65) MG
TABLET ORAL
Qty: 60 TABLET | Refills: 0 | Status: SHIPPED | OUTPATIENT
Start: 2023-11-04

## 2023-11-07 ENCOUNTER — TELEPHONE (OUTPATIENT)
Dept: FAMILY MEDICINE CLINIC | Facility: CLINIC | Age: 63
End: 2023-11-07
Payer: MEDICARE

## 2023-11-07 NOTE — TELEPHONE ENCOUNTER
RELAY    [facility]    PER NERISSA:  Renal function unchanged 1.32 mildly elevated.  Fasting blood sugar little more elevated than normal

## 2023-11-15 RX ORDER — CHLORHEXIDINE GLUCONATE ORAL RINSE 1.2 MG/ML
SOLUTION DENTAL
Qty: 946 ML | Refills: 0 | Status: SHIPPED | OUTPATIENT
Start: 2023-11-15

## 2023-11-18 LAB
BACTERIA UR CULT: NORMAL
BACTERIA UR CULT: NORMAL

## 2023-12-04 RX ORDER — FAMOTIDINE 20 MG/1
TABLET, FILM COATED ORAL
Qty: 60 TABLET | Refills: 4 | Status: SHIPPED | OUTPATIENT
Start: 2023-12-04

## 2023-12-04 RX ORDER — FERROUS SULFATE 325(65) MG
TABLET ORAL
Qty: 60 TABLET | Refills: 0 | Status: SHIPPED | OUTPATIENT
Start: 2023-12-04

## 2023-12-04 RX ORDER — CALCIUM CARBONATE/VITAMIN D3 600 MG-10
TABLET ORAL
Qty: 60 TABLET | Refills: 0 | Status: SHIPPED | OUTPATIENT
Start: 2023-12-04

## 2023-12-04 RX ORDER — ALENDRONATE SODIUM 70 MG/1
TABLET ORAL
Qty: 4 TABLET | Refills: 0 | Status: SHIPPED | OUTPATIENT
Start: 2023-12-04

## 2023-12-04 RX ORDER — FENOFIBRATE 48 MG/1
TABLET, COATED ORAL
Qty: 30 TABLET | Refills: 0 | Status: SHIPPED | OUTPATIENT
Start: 2023-12-04

## 2023-12-04 RX ORDER — LORATADINE 10 MG/1
TABLET ORAL
Qty: 30 TABLET | Refills: 0 | Status: SHIPPED | OUTPATIENT
Start: 2023-12-04

## 2023-12-13 RX ORDER — CHLORHEXIDINE GLUCONATE ORAL RINSE 1.2 MG/ML
SOLUTION DENTAL
Qty: 946 ML | Refills: 0 | Status: SHIPPED | OUTPATIENT
Start: 2023-12-13

## 2024-01-03 RX ORDER — FERROUS SULFATE 325(65) MG
TABLET ORAL
Qty: 60 TABLET | Refills: 0 | Status: SHIPPED | OUTPATIENT
Start: 2024-01-03

## 2024-01-03 RX ORDER — ALENDRONATE SODIUM 70 MG/1
TABLET ORAL
Qty: 4 TABLET | Refills: 0 | Status: SHIPPED | OUTPATIENT
Start: 2024-01-03

## 2024-01-03 RX ORDER — FENOFIBRATE 48 MG/1
TABLET, COATED ORAL
Qty: 30 TABLET | Refills: 0 | Status: SHIPPED | OUTPATIENT
Start: 2024-01-03

## 2024-01-03 RX ORDER — CALCIUM CARBONATE/VITAMIN D3 600 MG-10
TABLET ORAL
Qty: 60 TABLET | Refills: 0 | Status: SHIPPED | OUTPATIENT
Start: 2024-01-03

## 2024-01-03 RX ORDER — LORATADINE 10 MG/1
TABLET ORAL
Qty: 30 TABLET | Refills: 0 | Status: SHIPPED | OUTPATIENT
Start: 2024-01-03

## 2024-01-27 DIAGNOSIS — G40.109 SEIZURE, TEMPORAL LOBE: ICD-10-CM

## 2024-01-29 RX ORDER — LORATADINE 10 MG/1
TABLET ORAL
Qty: 30 TABLET | Refills: 0 | Status: SHIPPED | OUTPATIENT
Start: 2024-01-29

## 2024-01-29 RX ORDER — FERROUS SULFATE 325(65) MG
TABLET ORAL
Qty: 60 TABLET | Refills: 0 | Status: SHIPPED | OUTPATIENT
Start: 2024-01-29

## 2024-01-29 RX ORDER — CALCIUM CARBONATE/VITAMIN D3 600 MG-10
TABLET ORAL
Qty: 60 TABLET | Refills: 0 | Status: SHIPPED | OUTPATIENT
Start: 2024-01-29

## 2024-01-29 RX ORDER — PHENOBARBITAL 97.2 MG/1
TABLET ORAL
Qty: 90 TABLET | Refills: 1 | Status: SHIPPED | OUTPATIENT
Start: 2024-01-29

## 2024-01-29 RX ORDER — ALENDRONATE SODIUM 70 MG/1
TABLET ORAL
Qty: 4 TABLET | Refills: 0 | Status: SHIPPED | OUTPATIENT
Start: 2024-01-29

## 2024-01-29 RX ORDER — FENOFIBRATE 48 MG/1
TABLET, COATED ORAL
Qty: 30 TABLET | Refills: 0 | Status: SHIPPED | OUTPATIENT
Start: 2024-01-29

## 2024-02-07 ENCOUNTER — OFFICE VISIT (OUTPATIENT)
Dept: FAMILY MEDICINE CLINIC | Facility: CLINIC | Age: 64
End: 2024-02-07
Payer: MEDICARE

## 2024-02-07 VITALS
BODY MASS INDEX: 23.49 KG/M2 | TEMPERATURE: 97 F | HEIGHT: 68 IN | OXYGEN SATURATION: 99 % | HEART RATE: 79 BPM | DIASTOLIC BLOOD PRESSURE: 82 MMHG | SYSTOLIC BLOOD PRESSURE: 144 MMHG | WEIGHT: 155 LBS

## 2024-02-07 DIAGNOSIS — R35.0 URINARY FREQUENCY: ICD-10-CM

## 2024-02-07 DIAGNOSIS — N18.9 ANEMIA DUE TO CHRONIC KIDNEY DISEASE, UNSPECIFIED CKD STAGE: Primary | ICD-10-CM

## 2024-02-07 DIAGNOSIS — R79.89 ELEVATED SERUM CREATININE: ICD-10-CM

## 2024-02-07 DIAGNOSIS — E87.1 HYPONATREMIA: ICD-10-CM

## 2024-02-07 DIAGNOSIS — N39.0 CHRONIC URINARY TRACT INFECTION: ICD-10-CM

## 2024-02-07 DIAGNOSIS — R35.0 FREQUENT URINATION: ICD-10-CM

## 2024-02-07 DIAGNOSIS — D63.1 ANEMIA DUE TO CHRONIC KIDNEY DISEASE, UNSPECIFIED CKD STAGE: Primary | ICD-10-CM

## 2024-02-07 LAB
BILIRUB BLD-MCNC: NEGATIVE MG/DL
CLARITY, POC: ABNORMAL
COLOR UR: YELLOW
EXPIRATION DATE: ABNORMAL
GLUCOSE UR STRIP-MCNC: NEGATIVE MG/DL
KETONES UR QL: NEGATIVE
LEUKOCYTE EST, POC: ABNORMAL
Lab: ABNORMAL
NITRITE UR-MCNC: NEGATIVE MG/ML
PH UR: 7.5 [PH] (ref 5–8)
PROT UR STRIP-MCNC: ABNORMAL MG/DL
RBC # UR STRIP: ABNORMAL /UL
SP GR UR: 1.01 (ref 1–1.03)
UROBILINOGEN UR QL: NORMAL

## 2024-02-07 RX ORDER — NITROFURANTOIN MACROCRYSTALS 100 MG/1
100 CAPSULE ORAL 2 TIMES DAILY
Qty: 14 CAPSULE | Refills: 0 | Status: SHIPPED | OUTPATIENT
Start: 2024-02-07

## 2024-02-07 RX ORDER — CHLORHEXIDINE GLUCONATE ORAL RINSE 1.2 MG/ML
SOLUTION DENTAL
Qty: 946 ML | Refills: 0 | Status: SHIPPED | OUTPATIENT
Start: 2024-02-07

## 2024-02-07 NOTE — PATIENT INSTRUCTIONS
Blood work today  UA/urine culture  Try to move up urology appointment  Macrodantin 100 twice daily x 7 days  Follow-up urine 1 week after last antibiotic dose  Monitor weight

## 2024-02-07 NOTE — PROGRESS NOTES
"    Manuel Gaspar is a 63 y.o. male.     History of Present Illness  63-year-old white male lives in a group home with history of MR, anxiety, schizophrenia, osteoporosis, hypertension, kidney stones, chronic anemia, neurogenic bladder, hearing loss, chronic hyponatremia, chronic UTIs and seizure disorder who comes in today for 3-month follow-up visit    Blood pressure 144/82 heart rate 78 he denies any chest pain, dyspnea, tachycardia or dizziness    Caregiver states patient has had incontinence of urine at night the last couple nights.  Urinalysis once again shows 3+ leukocytes and blood.  Urine cultures always come back klever that is colonized.  They have an appointment with urology in August but caregiver is going to try to get it moved up.  I will place him on Macrodantin since we have been using Keflex so much.  He does have some mild renal insufficiency with his last creatinine 1.32 we will continue to monitor.  Also has mild anemia hemoglobin 12.3    Weight is down 2 pounds at 155 with a BMI of 23.6.  He is up-to-date on immunizations, eye exam, PSA is done by urology and colonoscopies are done through Dr. Valentine        Blood work today  UA/urine culture  Try to move up urology appointment  Macrodantin 100 twice daily x 7 days  Follow-up urine 1 week after last antibiotic dose  Monitor weight                 The following portions of the patient's history were reviewed and updated as appropriate: allergies, current medications, past family history, past medical history, past social history, past surgical history, and problem list.    Vitals:    02/07/24 0816   BP: 144/82   BP Location: Right arm   Patient Position: Sitting   Cuff Size: Adult   Pulse: 79   Temp: 97 °F (36.1 °C)   TempSrc: Infrared   SpO2: 99%   Weight: 70.3 kg (155 lb)   Height: 172.7 cm (67.99\")     Body mass index is 23.57 kg/m².    Past Medical History:   Diagnosis Date    Hearing loss     Hypertension     Kidney stone     Mental retardation  "    Neurogenic bladder     Seizure disorder     onset 16-17 years old, last seizure about 2015 when weaning phenobarb     Past Surgical History:   Procedure Laterality Date    BLADDER REPAIR      CATARACT EXTRACTION      ORIF PATELLA FRACTURE Left     THORACOTOMY      TYMPANOMASTOIDECTOMY       Family History   Family history unknown: Yes     Immunization History   Administered Date(s) Administered    COVID-19 (MODERNA) 1st,2nd,3rd Dose Monovalent 01/11/2021, 02/08/2021, 10/17/2021    COVID-19 (MODERNA) Monovalent Original Booster 11/24/2021, 08/18/2022    Flu Vaccine Intradermal Quad 18-64YR 10/05/2018, 11/19/2021    Flu Vaccine Quad PF 6-35MO 11/23/2016    Flu Vaccine Quad PF >36MO 11/23/2016, 11/12/2019    Fluzone (or Fluarix & Flulaval for VFC) >6mos 10/21/2020, 11/19/2021, 10/21/2022    Fluzone Quad >6mos (Multi-dose) 10/06/2015    H1N1 Inj 12/03/2009    Hepatitis A 05/23/2018, 11/16/2018    Influenza Inj MDCK Preserative Free 10/05/2018    Influenza Injectable Mdck Pf Quad 11/20/2019    Influenza Quad Vaccine (Inpatient) 10/26/2017    Influenza Seasonal Injectable 10/06/2015, 03/14/2017    Influenza Whole 12/07/1999    PPD Test 11/06/2002, 11/06/2007, 11/10/2009, 11/12/2010, 11/15/2011, 11/27/2012, 12/03/2013, 11/05/2014, 11/03/2015, 01/04/2016, 07/12/2017, 06/13/2018, 06/05/2019, 10/20/2020, 10/19/2021, 11/01/2022, 01/23/2024    Td (TDVAX) 06/13/2014    Tdap 05/22/2009       Orders Only on 11/16/2023   Component Date Value Ref Range Status    Urine Culture 11/16/2023 Final report   Final    Result 1 11/16/2023 Comment   Final    Comment: Culture shows less than 10,000 colony forming units of bacteria per  milliliter of urine. This colony count is not generally considered  to be clinically significant.           Review of Systems   Constitutional: Negative.    HENT: Negative.     Respiratory: Negative.     Cardiovascular: Negative.    Gastrointestinal: Negative.    Genitourinary:  Positive for urinary  incontinence.   Musculoskeletal: Negative.    Skin: Negative.    Neurological: Negative.    Psychiatric/Behavioral: Negative.         Objective   Physical Exam  Constitutional:       Appearance: Normal appearance.   HENT:      Head: Normocephalic.   Cardiovascular:      Rate and Rhythm: Normal rate and regular rhythm.      Pulses: Normal pulses.      Heart sounds: Normal heart sounds.   Pulmonary:      Effort: Pulmonary effort is normal.      Breath sounds: Normal breath sounds.   Abdominal:      General: Bowel sounds are normal.   Musculoskeletal:         General: Normal range of motion.   Skin:     General: Skin is warm.   Neurological:      General: No focal deficit present.      Mental Status: He is alert and oriented to person, place, and time.   Psychiatric:         Mood and Affect: Mood normal.         Behavior: Behavior normal.         Procedures    Assessment & Plan   Diagnoses and all orders for this visit:    1. Anemia due to chronic kidney disease, unspecified CKD stage (Primary)  -     CBC & Differential    2. Hyponatremia    3. Elevated serum creatinine  -     Comprehensive Metabolic Panel    4. Frequent urination  -     POC Urinalysis Dipstick, Automated  -     Urine Culture - Urine, Urine, Clean Catch    5. BMI 23.0-23.9, adult    6. Urinary frequency    7. Chronic urinary tract infection    Other orders  -     nitrofurantoin (Macrodantin) 100 MG capsule; Take 1 capsule by mouth 2 (Two) Times a Day.  Dispense: 14 capsule; Refill: 0           Current Outpatient Medications:     acetaminophen (TYLENOL) 325 MG tablet, TAKE 2 TABLETS BY MOUTH EVERY 4 HOURS AS NEEDED FOR FEVER AND PAIN ( MAX 12 DOSES IN 24 HOURS), Disp: 60 tablet, Rfl: 10    alendronate (FOSAMAX) 70 MG tablet, TAKE 1 TABLET BY MOUTH WEEKLY .TAKE WITH 8OZ WATER BEFORE ANY FOOD OR DRINK. DO NOT LIE DOWN FOR 30 MINUTES, Disp: 4 tablet, Rfl: 0    Allergy 4 MG tablet, TAKE 1 TABLET BY MOUTH 4 TO 6 HOURS AS NEEDED FOR ALLERGIES AND RUNNY NOSE,  Disp: 24 tablet, Rfl: 0    Calcium + Vitamin D3 600-10 MG-MCG tablet per tablet, TAKE 1 TABLET BY MOUTH TWICE DAILY, Disp: 60 tablet, Rfl: 0    chlorhexidine (PERIDEX) 0.12 % solution, SWISH AND SPIT WITH A 1/2 OUNCE TWICE DAILY, Disp: 946 mL, Rfl: 0    Colloidal Oatmeal (Aveeno Eczema Therapy) 1 % cream, Apply to dry skin 3 times a day, Disp: 354 each, Rfl: 5    DOCUSIL 100 MG capsule, TAKE ONE (1) CAPSULE BY MOUTH TWICE A DAY, Disp: 60 capsule, Rfl: 3    famotidine (PEPCID) 20 MG tablet, TAKE 1 TABLET BY MOUTH TWICE DAILY, Disp: 60 tablet, Rfl: 4    fenofibrate (TRICOR) 48 MG tablet, TAKE 1 TABLET BY MOUTH AT BEDTIME, Disp: 30 tablet, Rfl: 0    FeroSul 325 (65 Fe) MG tablet, TAKE 1 TABLET BY MOUTH TWICE DAILY, Disp: 60 tablet, Rfl: 0    GNP Antacid Regular Strength 200-200-20 MG/5ML suspension, , Disp: , Rfl:     GNP Milk of Magnesia 1200 MG/15ML suspension, , Disp: , Rfl:     hydrocortisone 2.5 % cream, , Disp: , Rfl:     lisinopril (PRINIVIL,ZESTRIL) 5 MG tablet, TAKE 1 TABLET BY MOUTH EVERY DAY IF BLOOD PRESSURE OVER 150/80 CAN GIVE ANOTHER 5MG TABLET (RUN 28 FOR DISPILL, WILL ORDER 2ND DOSE PRN), Disp: 60 tablet, Rfl: 1    loperamide (IMODIUM) 2 MG capsule, , Disp: , Rfl:     loratadine (CLARITIN) 10 MG tablet, TAKE 1 TABLET BY MOUTH EVERY DAY, Disp: 30 tablet, Rfl: 0    mupirocin (BACTROBAN) 2 % ointment, APPLY TO RIGHT 5TH TOE TWICE DAILY, Disp: , Rfl:     Neomycin-Bacitracin-Polymyxin (GNP TRIPLE ANTIBIOTIC) 3.5-400-5000 ointment, FOLLOW DIRECTIONS ON LABEL AS NEEDED TO PREVENT INFECTIONS IN MINOR CUTS AND SCRAPES. IF NO IMPROVEMENT IN 48 HOURS NOTIFY NURSE, Disp: 28.4 g, Rfl: 12    omeprazole (priLOSEC) 20 MG capsule, TAKE 1 CAPSULE BY MOUTH EVERY DAY AS NEEDED ( BUBBLE ), Disp: 30 capsule, Rfl: 0    OXcarbazepine (TRILEPTAL) 600 MG tablet, One tablet in am and two each pm, Disp: 90 tablet, Rfl: 11    PHENobarbital (LUMINAL) 97.2 MG tablet, TAKE 1 TABLET BY MOUTH EVERY NIGHT AT BEDTIME (NARC SHEET), Disp:  90 tablet, Rfl: 1    polyethylene glycol (MiraLax) 17 GM/SCOOP powder, Take 17 g by mouth Daily. Mix with water 17g daily with 8oz water prn, Disp: 1 each, Rfl: 2    Psyllium (Metamucil [facility] Fiber) 55.46 % powder, Take 1 teaspoon(s) by mouth 2 (Two) Times a Day. 1 tsp 1-2 x day as needed, Disp: 1 g, Rfl: 1    risperiDONE (risperDAL) 2 MG tablet, TAKE 1 TABLET BY MOUTH TWICE DAILY, Disp: 60 tablet, Rfl: 5    Robafen 100 MG/5ML liquid, , Disp: , Rfl:     tamsulosin (FLOMAX) 0.4 MG capsule 24 hr capsule, Take 1 capsule by mouth Daily., Disp: , Rfl:     tolnaftate (TINACTIN) 1 % external solution, Apply twice a day to affected area until resolved, Disp: 29.5 mL, Rfl: 2    white petrolatum gel gel, Apply 1 application topically to the appropriate area as directed As Needed (Dry skin). Apply to hands at night and cover with cotton gloves overnight, Disp: 212 g, Rfl: 2    nitrofurantoin (Macrodantin) 100 MG capsule, Take 1 capsule by mouth 2 (Two) Times a Day., Disp: 14 capsule, Rfl: 0           HORTENCIA Valentine 2/7/2024 09:29 EST  This note has been electronically signed

## 2024-02-08 LAB
ALBUMIN SERPL-MCNC: 3.9 G/DL (ref 3.9–4.9)
ALBUMIN/GLOB SERPL: 1.6 {RATIO} (ref 1.2–2.2)
ALP SERPL-CCNC: 68 IU/L (ref 44–121)
ALT SERPL-CCNC: 15 IU/L (ref 0–44)
AST SERPL-CCNC: 16 IU/L (ref 0–40)
BASOPHILS # BLD AUTO: 0.1 X10E3/UL (ref 0–0.2)
BASOPHILS NFR BLD AUTO: 1 %
BILIRUB SERPL-MCNC: <0.2 MG/DL (ref 0–1.2)
BUN SERPL-MCNC: 26 MG/DL (ref 8–27)
BUN/CREAT SERPL: 19 (ref 10–24)
CALCIUM SERPL-MCNC: 9.5 MG/DL (ref 8.6–10.2)
CHLORIDE SERPL-SCNC: 96 MMOL/L (ref 96–106)
CO2 SERPL-SCNC: 24 MMOL/L (ref 20–29)
CREAT SERPL-MCNC: 1.35 MG/DL (ref 0.76–1.27)
EGFRCR SERPLBLD CKD-EPI 2021: 58 ML/MIN/1.73
EOSINOPHIL # BLD AUTO: 0.3 X10E3/UL (ref 0–0.4)
EOSINOPHIL NFR BLD AUTO: 5 %
ERYTHROCYTE [DISTWIDTH] IN BLOOD BY AUTOMATED COUNT: 11.7 % (ref 11.6–15.4)
GLOBULIN SER CALC-MCNC: 2.5 G/DL (ref 1.5–4.5)
GLUCOSE SERPL-MCNC: 78 MG/DL (ref 70–99)
HCT VFR BLD AUTO: 33.6 % (ref 37.5–51)
HGB BLD-MCNC: 11.4 G/DL (ref 13–17.7)
IMM GRANULOCYTES # BLD AUTO: 0 X10E3/UL (ref 0–0.1)
IMM GRANULOCYTES NFR BLD AUTO: 0 %
LYMPHOCYTES # BLD AUTO: 0.6 X10E3/UL (ref 0.7–3.1)
LYMPHOCYTES NFR BLD AUTO: 12 %
MCH RBC QN AUTO: 32.6 PG (ref 26.6–33)
MCHC RBC AUTO-ENTMCNC: 33.9 G/DL (ref 31.5–35.7)
MCV RBC AUTO: 96 FL (ref 79–97)
MONOCYTES # BLD AUTO: 0.7 X10E3/UL (ref 0.1–0.9)
MONOCYTES NFR BLD AUTO: 14 %
NEUTROPHILS # BLD AUTO: 3.5 X10E3/UL (ref 1.4–7)
NEUTROPHILS NFR BLD AUTO: 68 %
PLATELET # BLD AUTO: 211 X10E3/UL (ref 150–450)
POTASSIUM SERPL-SCNC: 4.2 MMOL/L (ref 3.5–5.2)
PROT SERPL-MCNC: 6.4 G/DL (ref 6–8.5)
RBC # BLD AUTO: 3.5 X10E6/UL (ref 4.14–5.8)
SODIUM SERPL-SCNC: 133 MMOL/L (ref 134–144)
WBC # BLD AUTO: 5.2 X10E3/UL (ref 3.4–10.8)

## 2024-02-09 LAB
BACTERIA UR CULT: NORMAL
BACTERIA UR CULT: NORMAL

## 2024-02-22 LAB
BACTERIA UR CULT: NORMAL
BACTERIA UR CULT: NORMAL

## 2024-02-26 RX ORDER — LORATADINE 10 MG/1
TABLET ORAL
Qty: 30 TABLET | Refills: 0 | Status: SHIPPED | OUTPATIENT
Start: 2024-02-26

## 2024-02-26 RX ORDER — CALCIUM CARBONATE/VITAMIN D3 600 MG-10
TABLET ORAL
Qty: 60 TABLET | Refills: 0 | Status: SHIPPED | OUTPATIENT
Start: 2024-02-26

## 2024-02-26 RX ORDER — ALENDRONATE SODIUM 70 MG/1
TABLET ORAL
Qty: 4 TABLET | Refills: 0 | Status: SHIPPED | OUTPATIENT
Start: 2024-02-26

## 2024-02-26 RX ORDER — FERROUS SULFATE 325(65) MG
TABLET ORAL
Qty: 60 TABLET | Refills: 0 | Status: SHIPPED | OUTPATIENT
Start: 2024-02-26

## 2024-02-26 RX ORDER — FENOFIBRATE 48 MG/1
TABLET, COATED ORAL
Qty: 30 TABLET | Refills: 0 | Status: SHIPPED | OUTPATIENT
Start: 2024-02-26

## 2024-02-27 RX ORDER — LISINOPRIL 5 MG/1
TABLET ORAL
Qty: 60 TABLET | Refills: 0 | Status: SHIPPED | OUTPATIENT
Start: 2024-02-27 | End: 2024-02-29

## 2024-02-29 RX ORDER — LISINOPRIL 5 MG/1
TABLET ORAL
Qty: 60 TABLET | Refills: 0 | Status: SHIPPED | OUTPATIENT
Start: 2024-02-29

## 2024-03-06 ENCOUNTER — TELEPHONE (OUTPATIENT)
Dept: FAMILY MEDICINE CLINIC | Facility: CLINIC | Age: 64
End: 2024-03-06
Payer: MEDICARE

## 2024-03-06 DIAGNOSIS — Z20.822 EXPOSURE TO COVID-19 VIRUS: Primary | ICD-10-CM

## 2024-03-06 RX ORDER — CHLORHEXIDINE GLUCONATE ORAL RINSE 1.2 MG/ML
SOLUTION DENTAL
Qty: 946 ML | Refills: 0 | Status: SHIPPED | OUTPATIENT
Start: 2024-03-06

## 2024-03-06 NOTE — TELEPHONE ENCOUNTER
"There is an active case of Covid at the [facility]. Cecile states they are needing orders for Covid tests for all the residents. They use [facility] on [facility]   "

## 2024-03-13 ENCOUNTER — TELEMEDICINE (OUTPATIENT)
Dept: PSYCHIATRY | Facility: CLINIC | Age: 64
End: 2024-03-13
Payer: MEDICARE

## 2024-03-13 DIAGNOSIS — F20.0 PARANOID SCHIZOPHRENIA: Primary | Chronic | ICD-10-CM

## 2024-03-13 DIAGNOSIS — F41.1 GENERALIZED ANXIETY DISORDER: Chronic | ICD-10-CM

## 2024-03-13 DIAGNOSIS — F79 INTELLECTUAL DISABILITY: Chronic | ICD-10-CM

## 2024-03-13 PROCEDURE — 1159F MED LIST DOCD IN RCRD: CPT | Performed by: PHYSICIAN ASSISTANT

## 2024-03-13 PROCEDURE — 1160F RVW MEDS BY RX/DR IN RCRD: CPT | Performed by: PHYSICIAN ASSISTANT

## 2024-03-13 PROCEDURE — 99214 OFFICE O/P EST MOD 30 MIN: CPT | Performed by: PHYSICIAN ASSISTANT

## 2024-03-13 NOTE — PROGRESS NOTES
Subjective   Manuel Gaspar is a 63 y.o.white male with MR who [facility]    This provider is located in [place], [place] using a secure [employer] Video Visit through [employer]. Patient is being seen remotely via telehealth at their home address in [place], and stated they are in a secure environment for this session. The patient's condition being diagnosed/treated is appropriate for telemedicine. The provider identified herself as well as her credentials.   The patient, and/or patients guardian, consent to be seen remotely, and when consent is given they understand that the consent allows for patient identifiable information to be sent to a third party as needed.   They may refuse to be seen remotely at any time. The electronic data is encrypted and password protected, and the patient and/or guardian has been advised of the potential risks to privacy not withstanding such measures.   PT Identifiers used: Name and .    You have chosen to receive care through a telehealth visit.  Do you consent to use a video/audio connection for your medical care today? Yes      Chief Complaint:  Schizophrenia, Anxiety    History of Present Illness:   He is still working at the work shop 4 days a week, he has new job at the work shop.  His  is with him, he remains stable on Risperdal 2 mg with no behavioral issues.      Dang vaca, PCP, did labs in 2023, including CMP and lipids  He is still on Trileptal and Phenobarb for seizures.  Enjoys playing [employer]   No AVH, no paranoid symptoms  Denies anxiety or depression  No agitation or aggression since he went back up on Risperdal, some paranoia at lower dosage  No SI/HI  Medications are fine, no need for changes.   Sleeps well, grumpy at times    The following portions of the patient's history were reviewed and updated as appropriate: allergies, current medications, past family history, past medical history, past  social history, past surgical history and problem list.    PAST PSYCHIATRIC HISTORY  Axis I  Schizophrenia/cognitive disorder  Axis II  Learning Disorder    PAST OUTPATIENT TREATMENT  Diagnosis treated:  Cognitive Disorder  Treatment Type:  Medication Management  Prior Psychiatric Medications:  Risperdal  Trileptal  Support Groups:  None  Sequelae Of Mental Disorder:  emotional distress      Interval History  No Change    Side Effects  None    Past Psych History was reviewed and compared to 9/13/23 visit and no updates were needed.      Past Medical History:  Past Medical History:   Diagnosis Date    Hearing loss     Hypertension     Kidney stone     Mental retardation     Neurogenic bladder     Seizure disorder     onset 16-17 years old, last seizure about 2015 when weaning phenobarb       Social History:  Social History     Socioeconomic History    Marital status: Single   Tobacco Use    Smoking status: Never     Passive exposure: Never    Smokeless tobacco: Never   Vaping Use    Vaping status: Never Used   Substance and Sexual Activity    Alcohol use: No    Drug use: No    Sexual activity: Never       Family History:  Family History   Family history unknown: Yes       Past Surgical History:  Past Surgical History:   Procedure Laterality Date    BLADDER REPAIR      CATARACT EXTRACTION      ORIF PATELLA FRACTURE Left     THORACOTOMY      TYMPANOMASTOIDECTOMY         Problem List:  Patient Active Problem List   Diagnosis    Allergy status to unspecified drugs, medicaments and biological substances status    Anemia    Constipation    Deafness    Difficult or painful urination    Dysthymia    Encounter for lipid screening for cardiovascular disease    Encounter for screening    Encounter for screening for malignant neoplasm of prostate    Encounter for immunization    Therapeutic drug monitoring    Encounter for immunization    Encounter for general adult medical examination without abnormal findings    Enlarged  prostate without lower urinary tract symptoms (luts)    Generalized anxiety disorder    High cholesterol    Hypertension, benign    Hyponatremia    Knee pain    Intellectual disability    Nephrolithiasis    Otalgia    Otitis media    Partial epilepsy with impairment of consciousness, intractable    Peptic ulcer disease    Schizophrenia    Seizure disorder    Serum creatinine raised    Weight loss, non-intentional    Positive hepatitis C antibody test    BMI 23.0-23.9, adult    Other osteoporosis without current pathological fracture    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    Current Medications:   Current Outpatient Medications   Medication Sig Dispense Refill    acetaminophen (TYLENOL) 325 MG tablet TAKE 2 TABLETS BY MOUTH EVERY 4 HOURS AS NEEDED FOR FEVER AND PAIN ( MAX 12 DOSES IN 24 HOURS) 60 tablet 10    alendronate (FOSAMAX) 70 MG tablet TAKE 1 TABLET BY MOUTH WEEKLY .TAKE WITH 8OZ WATER BEFORE ANY FOOD OR DRINK. DO NOT LIE DOWN FOR 30 MINUTES 4 tablet 0    Allergy 4 MG tablet TAKE 1 TABLET BY MOUTH 4 TO 6 HOURS AS NEEDED FOR ALLERGIES AND RUNNY NOSE 24 tablet 0    Calcium + Vitamin D3 600-10 MG-MCG tablet per tablet TAKE 1 TABLET BY MOUTH TWICE DAILY 60 tablet 0    chlorhexidine (PERIDEX) 0.12 % solution SWISH AND SPIT WITH A 1/2 OUNCE TWICE DAILY 946 mL 0    Colloidal Oatmeal (Aveeno Eczema Therapy) 1 % cream Apply to dry skin 3 times a day 354 each 5    COVID-19 Home Collection Test kit 1 each by In Vitro route Daily As Needed (Exposure to COVID-19). 1 kit 3    DOCUSIL 100 MG capsule TAKE ONE (1) CAPSULE BY MOUTH TWICE A DAY 60 capsule 3    famotidine (PEPCID) 20 MG tablet TAKE 1 TABLET BY MOUTH TWICE DAILY 60 tablet 4    fenofibrate (TRICOR) 48 MG tablet TAKE 1 TABLET BY MOUTH  AT BEDTIME 30 tablet 0    FeroSul 325 (65 Fe) MG tablet TAKE 1 TABLET BY MOUTH TWICE DAILY 60 tablet 0    GNP Antacid Regular Strength 200-200-20 MG/5ML suspension       GNP Milk of Magnesia 1200 MG/15ML suspension       hydrocortisone 2.5 % cream       lisinopril (PRINIVIL,ZESTRIL) 5 MG tablet TAKE 1 TABLET BY MOUTH EVERY DAY IF BLOOD PRESSURE OVER 150/80 CAN GIVE ANOTHER 5MG TABLET (RUN 28 FOR DISPILL, WILL ORDER 2ND DOSE PRN) 60 tablet 0    loperamide (IMODIUM) 2 MG capsule       loratadine (CLARITIN) 10 MG tablet TAKE 1 TABLET BY MOUTH EVERY DAY 30 tablet 0    mupirocin (BACTROBAN) 2 % ointment APPLY TO RIGHT 5TH TOE TWICE DAILY      Neomycin-Bacitracin-Polymyxin (GNP TRIPLE ANTIBIOTIC) 3.5-400-5000 ointment FOLLOW DIRECTIONS ON LABEL AS NEEDED TO PREVENT INFECTIONS IN MINOR CUTS AND SCRAPES. IF NO IMPROVEMENT IN 48 HOURS NOTIFY NURSE 28.4 g 12    nitrofurantoin (Macrodantin) 100 MG capsule Take 1 capsule by mouth 2 (Two) Times a Day. 14 capsule 0    omeprazole (priLOSEC) 20 MG capsule TAKE 1 CAPSULE BY MOUTH EVERY DAY AS NEEDED ( BUBBLE ) 30 capsule 0    OXcarbazepine (TRILEPTAL) 600 MG tablet One tablet in am and two each pm 90 tablet 11    PHENobarbital (LUMINAL) 97.2 MG tablet TAKE 1 TABLET BY MOUTH EVERY NIGHT AT BEDTIME (NARC SHEET) 90 tablet 1    polyethylene glycol (MiraLax) 17 GM/SCOOP powder Take 17 g by mouth Daily. Mix with water 17g daily with 8oz water prn 1 each 2    Psyllium (Metamucil MultiHealth Fiber) 55.46 % powder Take 1 teaspoon(s) by mouth 2 (Two) Times a Day. 1 tsp 1-2 x day as needed 1 g 1    risperiDONE (risperDAL) 2 MG tablet TAKE 1 TABLET BY MOUTH TWICE DAILY 60 tablet 5    Robafen 100 MG/5ML liquid       tamsulosin (FLOMAX) 0.4 MG capsule 24 hr capsule Take 1 capsule by mouth Daily.      tolnaftate (TINACTIN) 1 % external solution Apply twice a day to affected area until resolved 29.5 mL 2    white petrolatum gel gel Apply 1 application topically to the appropriate area as directed  As Needed (Dry skin). Apply to hands at night and cover with cotton gloves overnight 212 g 2     No current facility-administered medications for this visit.         Review of Symptoms:    Psychiatric/Behavioral: Negative for agitation, behavioral problems, confusion, decreased concentration, dysphoric mood, hallucinations, self-injury, sleep disturbance and suicidal ideas. The patient is not nervous/anxious and is not hyperactive.        Physical Exam:   There were no vitals taken for this visit.    Mental Status Exam:   Hygiene:   Good  Cooperation:  Cooperative  Eye Contact:  Good  Psychomotor Behavior:  Slow  Affect:  Appropriate  Mood: normal, smiling  Hopelessness: Denies  Speech:  Normal  Thought Process:  Goal directed  Thought Content:  Normal  Suicidal:  None  Homicidal:  None  Hallucinations:  None  Delusion:  None  Memory:  Deficits  Orientation:  Person, Place, Time and Situation  Reliability:  fair  Insight:  Fair  Judgement:  Fair  Impulse Control:  Good  Physical/Medical Issues:  No      Mental Status Exam was reviewed and compared to 9/13/23 visit and no updates were needed, the exam was the same.        PHQ-9 Depression Screening  Little interest or pleasure in doing things? (P) 0-->not at all   Feeling down, depressed, or hopeless? (P) 0-->not at all   Trouble falling or staying asleep, or sleeping too much? (P) 0-->not at all   Feeling tired or having little energy? (P) 0-->not at all   Poor appetite or overeating? (P) 0-->not at all   Feeling bad about yourself - or that you are a failure or have let yourself or your family down? (P) 0-->not at all   Trouble concentrating on things, such as reading the newspaper or watching television? (P) 0-->not at all   Moving or speaking so slowly that other people could have noticed? Or the opposite - being so fidgety or restless that you have been moving around a lot more than usual? (P) 0-->not at all   Thoughts that you would be better off dead, or of  hurting yourself in some way? (P) 0-->not at all   PHQ-9 Total Score (P) 0   If you checked off any problems, how difficult have these problems made it for you to do your work, take care of things at home, or get along with other people? (P) not difficult at all           Never smoker    I advised Ray of the risks of tobacco use.     Lab Results:   Orders Only on 02/20/2024   Component Date Value Ref Range Status    Urine Culture 02/20/2024 Final report   Final    Result 1 02/20/2024 Comment   Final    Comment: Mixed urogenital klever  50,000-100,000 colony forming units per mL     Office Visit on 02/07/2024   Component Date Value Ref Range Status    WBC 02/07/2024 5.2  3.4 - 10.8 x10E3/uL Final    RBC 02/07/2024 3.50 (L)  4.14 - 5.80 x10E6/uL Final    Hemoglobin 02/07/2024 11.4 (L)  13.0 - 17.7 g/dL Final    Hematocrit 02/07/2024 33.6 (L)  37.5 - 51.0 % Final    MCV 02/07/2024 96  79 - 97 fL Final    MCH 02/07/2024 32.6  26.6 - 33.0 pg Final    MCHC 02/07/2024 33.9  31.5 - 35.7 g/dL Final    RDW 02/07/2024 11.7  11.6 - 15.4 % Final    Platelets 02/07/2024 211  150 - 450 x10E3/uL Final    Neutrophil Rel % 02/07/2024 68  Not Estab. % Final    Lymphocyte Rel % 02/07/2024 12  Not Estab. % Final    Monocyte Rel % 02/07/2024 14  Not Estab. % Final    Eosinophil Rel % 02/07/2024 5  Not Estab. % Final    Basophil Rel % 02/07/2024 1  Not Estab. % Final    Neutrophils Absolute 02/07/2024 3.5  1.4 - 7.0 x10E3/uL Final    Lymphocytes Absolute 02/07/2024 0.6 (L)  0.7 - 3.1 x10E3/uL Final    Monocytes Absolute 02/07/2024 0.7  0.1 - 0.9 x10E3/uL Final    Eosinophils Absolute 02/07/2024 0.3  0.0 - 0.4 x10E3/uL Final    Basophils Absolute 02/07/2024 0.1  0.0 - 0.2 x10E3/uL Final    Immature Granulocyte Rel % 02/07/2024 0  Not Estab. % Final    Immature Grans Absolute 02/07/2024 0.0  0.0 - 0.1 x10E3/uL Final    Glucose 02/07/2024 78  70 - 99 mg/dL Final    BUN 02/07/2024 26  8 - 27 mg/dL Final    Creatinine 02/07/2024 1.35 (H)  0.76  - 1.27 mg/dL Final    EGFR Result 02/07/2024 58 (L)  >59 mL/min/1.73 Final    BUN/Creatinine Ratio 02/07/2024 19  10 - 24 Final    Sodium 02/07/2024 133 (L)  134 - 144 mmol/L Final    Potassium 02/07/2024 4.2  3.5 - 5.2 mmol/L Final    Chloride 02/07/2024 96  96 - 106 mmol/L Final    Total CO2 02/07/2024 24  20 - 29 mmol/L Final    Calcium 02/07/2024 9.5  8.6 - 10.2 mg/dL Final    Total Protein 02/07/2024 6.4  6.0 - 8.5 g/dL Final    Albumin 02/07/2024 3.9  3.9 - 4.9 g/dL Final    Globulin 02/07/2024 2.5  1.5 - 4.5 g/dL Final    A/G Ratio 02/07/2024 1.6  1.2 - 2.2 Final    Total Bilirubin 02/07/2024 <0.2  0.0 - 1.2 mg/dL Final    Alkaline Phosphatase 02/07/2024 68  44 - 121 IU/L Final    AST (SGOT) 02/07/2024 16  0 - 40 IU/L Final    ALT (SGPT) 02/07/2024 15  0 - 44 IU/L Final    Color 02/07/2024 Yellow  Yellow, Straw, Dark Yellow, Lluvia Final    Clarity, UA 02/07/2024 Cloudy (A)  Clear Final    Specific Gravity  02/07/2024 1.010  1.005 - 1.030 Final    pH, Urine 02/07/2024 7.5  5.0 - 8.0 Final    Leukocytes 02/07/2024 Large (3+) (A)  Negative Final    Nitrite, UA 02/07/2024 Negative  Negative Final    Protein, POC 02/07/2024 Trace (A)  Negative mg/dL Final    Glucose, UA 02/07/2024 Negative  Negative mg/dL Final    Ketones, UA 02/07/2024 Negative  Negative Final    Urobilinogen, UA 02/07/2024 Normal  Normal, 0.2 E.U./dL Final    Bilirubin 02/07/2024 Negative  Negative Final    Blood, UA 02/07/2024 1+ (A)  Negative Final    Lot Number 02/07/2024 [id number]   Final    Expiration Date 02/07/2024 10,[id number]   Final    Urine Culture 02/07/2024 Final report   Final    Result 1 02/07/2024 Comment   Final    Comment: Mixed urogenital klever  25,000-50,000 colony forming units per mL         Assessment & Plan   Problems Addressed this Visit          Mental Health    Generalized anxiety disorder (Chronic)    Schizophrenia - Primary (Chronic)       Neuro    Intellectual disability (Chronic)     Diagnoses         Codes  Comments    Paranoid schizophrenia    -  Primary ICD-10-CM: F20.0  ICD-9-CM: 295.30     Generalized anxiety disorder     ICD-10-CM: F41.1  ICD-9-CM: 300.02     Intellectual disability     ICD-10-CM: F79  ICD-9-CM: 319             Visit Diagnoses:    ICD-10-CM ICD-9-CM   1. Paranoid schizophrenia  F20.0 295.30   2. Generalized anxiety disorder  F41.1 300.02   3. Intellectual disability  F79 319           TREATMENT PLAN/GOALS: Continue supportive psychotherapy efforts and medications as indicated. Treatment and medication options discussed during today's visit. Patient ackowledged and verbally consented to continue with current treatment plan and was educated on the importance of compliance with treatment and follow-up appointments.    MEDICATION ISSUES:  INSPECT reviewed as expected  Discussed medication options and treatment plan of prescribed medication as well as the risks, benefits, and side effects including potential falls, possible impaired driving and metabolic adversities among others. Patient is agreeable to call the office with any worsening of symptoms or onset of side effects. Patient is agreeable to call 911 or go to the nearest ER should he/she begin having SI/HI. No medication side effects or related complaints today.     Patient continues to do well on Risperdal, did not do well with dosage reduction and back up to 2 mg tabs.    Continue Risperdal 2 mg BID   Continue Trileptal prescribed by neurology (Dr, Seipel) for seizures, sees him annually  Bloodwork done by Dang Holder on  2/7/24      MEDS ORDERED DURING VISIT:  No orders of the defined types were placed in this encounter.      Return in about 6 months (around 9/13/2024) for video visit.      This document has been electronically signed by Krista Epstein PA-C  March 13, 2024 08:39 EDT

## 2024-03-22 DIAGNOSIS — F20.0 PARANOID SCHIZOPHRENIA: ICD-10-CM

## 2024-03-22 RX ORDER — RISPERIDONE 2 MG/1
TABLET ORAL
Qty: 58 TABLET | Refills: 5 | Status: SHIPPED | OUTPATIENT
Start: 2024-03-22

## 2024-03-23 RX ORDER — LORATADINE 10 MG/1
TABLET ORAL
Qty: 30 TABLET | Refills: 0 | Status: SHIPPED | OUTPATIENT
Start: 2024-03-23

## 2024-03-23 RX ORDER — CALCIUM CARBONATE/VITAMIN D3 600 MG-10
TABLET ORAL
Qty: 60 TABLET | Refills: 0 | Status: SHIPPED | OUTPATIENT
Start: 2024-03-23

## 2024-03-23 RX ORDER — FERROUS SULFATE 325(65) MG
TABLET ORAL
Qty: 60 TABLET | Refills: 0 | Status: SHIPPED | OUTPATIENT
Start: 2024-03-23

## 2024-03-23 RX ORDER — FENOFIBRATE 48 MG/1
TABLET, COATED ORAL
Qty: 30 TABLET | Refills: 0 | Status: SHIPPED | OUTPATIENT
Start: 2024-03-23

## 2024-03-23 RX ORDER — ALENDRONATE SODIUM 70 MG/1
TABLET ORAL
Qty: 4 TABLET | Refills: 0 | Status: SHIPPED | OUTPATIENT
Start: 2024-03-23

## 2024-04-19 DIAGNOSIS — G40.109 SEIZURE, TEMPORAL LOBE: ICD-10-CM

## 2024-04-19 RX ORDER — OXCARBAZEPINE 600 MG/1
TABLET, FILM COATED ORAL
Qty: 90 TABLET | Refills: 10 | Status: SHIPPED | OUTPATIENT
Start: 2024-04-19

## 2024-04-22 RX ORDER — FENOFIBRATE 48 MG/1
TABLET, COATED ORAL
Qty: 30 TABLET | Refills: 0 | Status: SHIPPED | OUTPATIENT
Start: 2024-04-22

## 2024-04-22 RX ORDER — ALENDRONATE SODIUM 70 MG/1
TABLET ORAL
Qty: 4 TABLET | Refills: 0 | Status: SHIPPED | OUTPATIENT
Start: 2024-04-22

## 2024-04-22 RX ORDER — CALCIUM CARBONATE/VITAMIN D3 600 MG-10
TABLET ORAL
Qty: 60 TABLET | Refills: 0 | Status: SHIPPED | OUTPATIENT
Start: 2024-04-22

## 2024-04-22 RX ORDER — FERROUS SULFATE 325(65) MG
TABLET ORAL
Qty: 60 TABLET | Refills: 0 | Status: SHIPPED | OUTPATIENT
Start: 2024-04-22

## 2024-04-22 RX ORDER — LORATADINE 10 MG/1
TABLET ORAL
Qty: 30 TABLET | Refills: 0 | Status: SHIPPED | OUTPATIENT
Start: 2024-04-22

## 2024-04-22 RX ORDER — LISINOPRIL 5 MG/1
TABLET ORAL
Qty: 60 TABLET | Refills: 0 | Status: SHIPPED | OUTPATIENT
Start: 2024-04-22

## 2024-04-22 RX ORDER — FAMOTIDINE 20 MG/1
TABLET, FILM COATED ORAL
Qty: 60 TABLET | Refills: 3 | Status: SHIPPED | OUTPATIENT
Start: 2024-04-22
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
## 2024-04-24 ENCOUNTER — OFFICE VISIT (OUTPATIENT)
Dept: NEUROLOGY | Facility: CLINIC | Age: 64
End: 2024-04-24
Payer: MEDICARE

## 2024-04-24 VITALS
SYSTOLIC BLOOD PRESSURE: 114 MMHG | DIASTOLIC BLOOD PRESSURE: 74 MMHG | HEIGHT: 68 IN | HEART RATE: 61 BPM | WEIGHT: 157 LBS | BODY MASS INDEX: 23.79 KG/M2

## 2024-04-24 DIAGNOSIS — F79 INTELLECTUAL DISABILITY: Chronic | ICD-10-CM

## 2024-04-24 DIAGNOSIS — G40.219 PARTIAL EPILEPSY WITH IMPAIRMENT OF CONSCIOUSNESS, INTRACTABLE: Primary | ICD-10-CM

## 2024-04-24 DIAGNOSIS — G40.109 SEIZURE, TEMPORAL LOBE: ICD-10-CM

## 2024-04-24 PROCEDURE — 3074F SYST BP LT 130 MM HG: CPT | Performed by: PSYCHIATRY & NEUROLOGY

## 2024-04-24 PROCEDURE — 1160F RVW MEDS BY RX/DR IN RCRD: CPT | Performed by: PSYCHIATRY & NEUROLOGY

## 2024-04-24 PROCEDURE — 99214 OFFICE O/P EST MOD 30 MIN: CPT | Performed by: PSYCHIATRY & NEUROLOGY

## 2024-04-24 PROCEDURE — 3078F DIAST BP <80 MM HG: CPT | Performed by: PSYCHIATRY & NEUROLOGY

## 2024-04-24 PROCEDURE — 1159F MED LIST DOCD IN RCRD: CPT | Performed by: PSYCHIATRY & NEUROLOGY

## 2024-04-24 RX ORDER — BRIMONIDINE TARTRATE, TIMOLOL MALEATE 2; 5 MG/ML; MG/ML
SOLUTION/ DROPS OPHTHALMIC
COMMUNITY
Start: 2024-03-05

## 2024-04-24 RX ORDER — DEXAMETHASONE SODIUM PHOSPHATE 1 MG/ML
SOLUTION/ DROPS OPHTHALMIC
COMMUNITY
Start: 2024-01-22

## 2024-04-30 RX ORDER — CHLORHEXIDINE GLUCONATE ORAL RINSE 1.2 MG/ML
SOLUTION DENTAL
Qty: 946 ML | Refills: 0 | Status: SHIPPED | OUTPATIENT
Start: 2024-04-30

## 2024-05-07 ENCOUNTER — OFFICE VISIT (OUTPATIENT)
Dept: FAMILY MEDICINE CLINIC | Facility: CLINIC | Age: 64
End: 2024-05-07
Payer: MEDICARE

## 2024-05-07 VITALS
HEART RATE: 63 BPM | OXYGEN SATURATION: 100 % | HEIGHT: 68 IN | TEMPERATURE: 97.1 F | SYSTOLIC BLOOD PRESSURE: 110 MMHG | DIASTOLIC BLOOD PRESSURE: 60 MMHG | WEIGHT: 156 LBS | BODY MASS INDEX: 23.64 KG/M2

## 2024-05-07 DIAGNOSIS — N18.9 ANEMIA DUE TO CHRONIC KIDNEY DISEASE, UNSPECIFIED CKD STAGE: Primary | ICD-10-CM

## 2024-05-07 DIAGNOSIS — D63.1 ANEMIA DUE TO CHRONIC KIDNEY DISEASE, UNSPECIFIED CKD STAGE: Primary | ICD-10-CM

## 2024-05-07 DIAGNOSIS — N18.31 STAGE 3A CHRONIC KIDNEY DISEASE: ICD-10-CM

## 2024-05-07 PROCEDURE — 3074F SYST BP LT 130 MM HG: CPT | Performed by: NURSE PRACTITIONER

## 2024-05-07 PROCEDURE — 99213 OFFICE O/P EST LOW 20 MIN: CPT | Performed by: NURSE PRACTITIONER

## 2024-05-07 PROCEDURE — 3078F DIAST BP <80 MM HG: CPT | Performed by: NURSE PRACTITIONER

## 2024-05-07 PROCEDURE — 1126F AMNT PAIN NOTED NONE PRSNT: CPT | Performed by: NURSE PRACTITIONER

## 2024-05-08 LAB
ALBUMIN SERPL-MCNC: 4.1 G/DL (ref 3.9–4.9)
ALBUMIN/GLOB SERPL: 1.9 {RATIO} (ref 1.2–2.2)
ALP SERPL-CCNC: 64 IU/L (ref 44–121)
ALT SERPL-CCNC: 15 IU/L (ref 0–44)
AST SERPL-CCNC: 17 IU/L (ref 0–40)
BASOPHILS # BLD AUTO: 0 X10E3/UL (ref 0–0.2)
BASOPHILS NFR BLD AUTO: 1 %
BILIRUB SERPL-MCNC: 0.2 MG/DL (ref 0–1.2)
BUN SERPL-MCNC: 21 MG/DL (ref 8–27)
BUN/CREAT SERPL: 18 (ref 10–24)
CALCIUM SERPL-MCNC: 9.4 MG/DL (ref 8.6–10.2)
CHLORIDE SERPL-SCNC: 96 MMOL/L (ref 96–106)
CO2 SERPL-SCNC: 24 MMOL/L (ref 20–29)
CREAT SERPL-MCNC: 1.2 MG/DL (ref 0.76–1.27)
EGFRCR SERPLBLD CKD-EPI 2021: 67 ML/MIN/1.73
EOSINOPHIL # BLD AUTO: 0.2 X10E3/UL (ref 0–0.4)
EOSINOPHIL NFR BLD AUTO: 5 %
ERYTHROCYTE [DISTWIDTH] IN BLOOD BY AUTOMATED COUNT: 12.2 % (ref 11.6–15.4)
FERRITIN SERPL-MCNC: 504 NG/ML (ref 30–400)
GLOBULIN SER CALC-MCNC: 2.2 G/DL (ref 1.5–4.5)
GLUCOSE SERPL-MCNC: 82 MG/DL (ref 70–99)
HCT VFR BLD AUTO: 33.9 % (ref 37.5–51)
HGB BLD-MCNC: 11.7 G/DL (ref 13–17.7)
IMM GRANULOCYTES # BLD AUTO: 0 X10E3/UL (ref 0–0.1)
IMM GRANULOCYTES NFR BLD AUTO: 1 %
IRON SATN MFR SERPL: 41 % (ref 15–55)
IRON SERPL-MCNC: 118 UG/DL (ref 38–169)
LYMPHOCYTES # BLD AUTO: 0.7 X10E3/UL (ref 0.7–3.1)
LYMPHOCYTES NFR BLD AUTO: 16 %
MCH RBC QN AUTO: 33.4 PG (ref 26.6–33)
MCHC RBC AUTO-ENTMCNC: 34.5 G/DL (ref 31.5–35.7)
MCV RBC AUTO: 97 FL (ref 79–97)
MONOCYTES # BLD AUTO: 0.6 X10E3/UL (ref 0.1–0.9)
MONOCYTES NFR BLD AUTO: 14 %
NEUTROPHILS # BLD AUTO: 2.8 X10E3/UL (ref 1.4–7)
NEUTROPHILS NFR BLD AUTO: 63 %
PLATELET # BLD AUTO: 187 X10E3/UL (ref 150–450)
POTASSIUM SERPL-SCNC: 4.2 MMOL/L (ref 3.5–5.2)
PROT SERPL-MCNC: 6.3 G/DL (ref 6–8.5)
RBC # BLD AUTO: 3.5 X10E6/UL (ref 4.14–5.8)
RETICS/RBC NFR AUTO: 1 % (ref 0.6–2.6)
SODIUM SERPL-SCNC: 132 MMOL/L (ref 134–144)
TIBC SERPL-MCNC: 285 UG/DL (ref 250–450)
UIBC SERPL-MCNC: 167 UG/DL (ref 111–343)
VIT B12 SERPL-MCNC: 712 PG/ML (ref 232–1245)
WBC # BLD AUTO: 4.4 X10E3/UL (ref 3.4–10.8)

## 2024-05-14 ENCOUNTER — OFFICE VISIT (OUTPATIENT)
Dept: FAMILY MEDICINE CLINIC | Facility: CLINIC | Age: 64
End: 2024-05-14
Payer: MEDICARE

## 2024-05-14 VITALS
BODY MASS INDEX: 23.49 KG/M2 | SYSTOLIC BLOOD PRESSURE: 102 MMHG | HEIGHT: 68 IN | DIASTOLIC BLOOD PRESSURE: 64 MMHG | WEIGHT: 155 LBS | HEART RATE: 69 BPM | TEMPERATURE: 97.3 F | OXYGEN SATURATION: 96 %
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
## 2024-05-14 DIAGNOSIS — S89.92XA INJURY OF LEFT KNEE, INITIAL ENCOUNTER: Primary | ICD-10-CM

## 2024-05-14 PROCEDURE — 99213 OFFICE O/P EST LOW 20 MIN: CPT | Performed by: NURSE PRACTITIONER

## 2024-05-14 PROCEDURE — 3074F SYST BP LT 130 MM HG: CPT | Performed by: NURSE PRACTITIONER

## 2024-05-14 PROCEDURE — 1126F AMNT PAIN NOTED NONE PRSNT: CPT | Performed by: NURSE PRACTITIONER

## 2024-05-14 PROCEDURE — 3078F DIAST BP <80 MM HG: CPT | Performed by: NURSE PRACTITIONER

## 2024-05-14 RX ORDER — NAPROXEN SODIUM 220 MG
TABLET ORAL
Qty: 30 TABLET | Refills: 0 | Status: SHIPPED | OUTPATIENT
Start: 2024-05-14

## 2024-05-14 NOTE — PATIENT INSTRUCTIONS
Left knee x-ray  Aleve 220 mg twice a day x 5 days only with food  Follow-up compression sleeve on left knee  Limit walking and standing activity for 2 weeks  Follow-up if no improvement

## 2024-05-14 NOTE — PROGRESS NOTES
"    Manuel Gaspar is a 63 y.o. male.     History of Present Illness  63-year-old white male lives in a group home with history of MR, anxiety, schizophrenia, osteoporosis, hypertension, kidney stones, chronic anemia, neurogenic bladder, hearing loss, chronic hyponatremia, chronic UTIs and seizure disorder who comes in today with caregiver after falling this morning at workshop on his knees.  He states he tripped over shoestring he is complaining of left knee pain.  Will get x-rays today place him on Aleve to 20 mg twice a day for 5 days and I ordered him a follow-up compression sleeve to wear on his left knee.    Vital signs stable            Left knee x-ray  Aleve 220 mg twice a day x 5 days only with food  Follow-up compression sleeve on left knee  Limit walking and standing activity for 2 weeks  Follow-up if no improvement       The following portions of the patient's history were reviewed and updated as appropriate: allergies, current medications, past family history, past medical history, past social history, past surgical history, and problem list.    Vitals:    05/14/24 1131   BP: 102/64   BP Location: Right arm   Patient Position: Sitting   Cuff Size: Adult   Pulse: 69   Temp: 97.3 °F (36.3 °C)   TempSrc: Infrared   SpO2: 96%   Weight: 70.3 kg (155 lb)   Height: 172.7 cm (67.99\")     Body mass index is 23.57 kg/m².    Past Medical History:   Diagnosis Date    Hearing loss     Hypertension     Kidney stone     Mental retardation     Neurogenic bladder     Seizure disorder     onset 16-17 years old, last seizure about 2015 when weaning phenobarb     Past Surgical History:   Procedure Laterality Date    BLADDER REPAIR      CATARACT EXTRACTION      ORIF PATELLA FRACTURE Left     THORACOTOMY      TYMPANOMASTOIDECTOMY       Family History   Family history unknown: Yes     Immunization History   Administered Date(s) Administered    COVID-19 (MODERNA) 1st,2nd,3rd Dose Monovalent 01/11/2021, 02/08/2021, 10/17/2021    " COVID-19 (MODERNA) Monovalent Original Booster 11/24/2021, 08/18/2022    Flu Vaccine Intradermal Quad 18-64YR 10/05/2018, 11/19/2021    Flu Vaccine Quad PF 6-35MO 11/23/2016    Flu Vaccine Quad PF >36MO 11/23/2016, 11/12/2019    Fluzone (or Fluarix & Flulaval for VFC) >6mos 10/21/2020, 11/19/2021, 10/21/2022    Fluzone Quad >6mos (Multi-dose) 10/06/2015    H1N1 Inj 12/03/2009    Hepatitis A 05/23/2018, 11/16/2018    Influenza Inj MDCK Preserative Free 10/05/2018    Influenza Injectable Mdck Pf Quad 11/20/2019    Influenza Quad Vaccine (Inpatient) 10/26/2017    Influenza Seasonal Injectable 10/06/2015, 03/14/2017    Influenza Whole 12/07/1999    PPD Test 11/06/2002, 11/06/2007, 11/10/2009, 11/12/2010, 11/15/2011, 11/27/2012, 12/03/2013, 11/05/2014, 11/03/2015, 01/04/2016, 07/12/2017, 06/13/2018, 06/05/2019, 10/20/2020, 10/19/2021, 11/01/2022, 01/23/2024    Td (TDVAX) 06/13/2014    Tdap 05/22/2009       Office Visit on 05/07/2024   Component Date Value Ref Range Status    Glucose 05/07/2024 82  70 - 99 mg/dL Final    BUN 05/07/2024 21  8 - 27 mg/dL Final    Creatinine 05/07/2024 1.20  0.76 - 1.27 mg/dL Final    EGFR Result 05/07/2024 67  >59 mL/min/1.73 Final    BUN/Creatinine Ratio 05/07/2024 18  10 - 24 Final    Sodium 05/07/2024 132 (L)  134 - 144 mmol/L Final    Potassium 05/07/2024 4.2  3.5 - 5.2 mmol/L Final    Chloride 05/07/2024 96  96 - 106 mmol/L Final    Total CO2 05/07/2024 24  20 - 29 mmol/L Final    Calcium 05/07/2024 9.4  8.6 - 10.2 mg/dL Final    Total Protein 05/07/2024 6.3  6.0 - 8.5 g/dL Final    Albumin 05/07/2024 4.1  3.9 - 4.9 g/dL Final    Globulin 05/07/2024 2.2  1.5 - 4.5 g/dL Final    A/G Ratio 05/07/2024 1.9  1.2 - 2.2 Final    Total Bilirubin 05/07/2024 0.2  0.0 - 1.2 mg/dL Final    Alkaline Phosphatase 05/07/2024 64  44 - 121 IU/L Final    AST (SGOT) 05/07/2024 17  0 - 40 IU/L Final    ALT (SGPT) 05/07/2024 15  0 - 44 IU/L Final    WBC 05/07/2024 4.4  3.4 - 10.8 x10E3/uL Final    RBC  05/07/2024 3.50 (L)  4.14 - 5.80 x10E6/uL Final    Hemoglobin 05/07/2024 11.7 (L)  13.0 - 17.7 g/dL Final    Hematocrit 05/07/2024 33.9 (L)  37.5 - 51.0 % Final    MCV 05/07/2024 97  79 - 97 fL Final    MCH 05/07/2024 33.4 (H)  26.6 - 33.0 pg Final    MCHC 05/07/2024 34.5  31.5 - 35.7 g/dL Final    RDW 05/07/2024 12.2  11.6 - 15.4 % Final    Platelets 05/07/2024 187  150 - 450 x10E3/uL Final    Neutrophil Rel % 05/07/2024 63  Not Estab. % Final    Lymphocyte Rel % 05/07/2024 16  Not Estab. % Final    Monocyte Rel % 05/07/2024 14  Not Estab. % Final    Eosinophil Rel % 05/07/2024 5  Not Estab. % Final    Basophil Rel % 05/07/2024 1  Not Estab. % Final    Neutrophils Absolute 05/07/2024 2.8  1.4 - 7.0 x10E3/uL Final    Lymphocytes Absolute 05/07/2024 0.7  0.7 - 3.1 x10E3/uL Final    Monocytes Absolute 05/07/2024 0.6  0.1 - 0.9 x10E3/uL Final    Eosinophils Absolute 05/07/2024 0.2  0.0 - 0.4 x10E3/uL Final    Basophils Absolute 05/07/2024 0.0  0.0 - 0.2 x10E3/uL Final    Immature Granulocyte Rel % 05/07/2024 1  Not Estab. % Final    Immature Grans Absolute 05/07/2024 0.0  0.0 - 0.1 x10E3/uL Final    TIBC 05/07/2024 285  250 - 450 ug/dL Final    UIBC 05/07/2024 167  111 - 343 ug/dL Final    Iron 05/07/2024 118  38 - 169 ug/dL Final    Iron Saturation 05/07/2024 41  15 - 55 % Final    Vitamin B-12 05/07/2024 712  232 - 1,245 pg/mL Final    Ferritin 05/07/2024 504 (H)  30 - 400 ng/mL Final    Reticulocyte Absolute 05/07/2024 1.0  0.6 - 2.6 % Final         Review of Systems   Constitutional: Negative.    HENT: Negative.     Respiratory: Negative.     Cardiovascular: Negative.    Gastrointestinal: Negative.    Genitourinary: Negative.    Musculoskeletal:         Left knee pain   Skin: Negative.    Neurological: Negative.    Psychiatric/Behavioral: Negative.         Objective   Physical Exam  Constitutional:       Appearance: Normal appearance.   HENT:      Head: Normocephalic.   Cardiovascular:      Rate and Rhythm: Normal  rate and regular rhythm.      Pulses: Normal pulses.      Heart sounds: Normal heart sounds.   Pulmonary:      Effort: Pulmonary effort is normal.      Breath sounds: Normal breath sounds.   Abdominal:      General: Bowel sounds are normal.   Musculoskeletal:         General: Tenderness present.      Comments: Palpation more painful right above-knee and patient limping with ambulation   Skin:     General: Skin is warm.   Neurological:      General: No focal deficit present.      Mental Status: He is alert and oriented to person, place, and time.   Psychiatric:         Mood and Affect: Mood normal.         Behavior: Behavior normal.         Procedures    Assessment & Plan   Diagnoses and all orders for this visit:    1. Injury of left knee, initial encounter (Primary)  -     XR Knee 1 or 2 View Left (In Office)    2. Fall, initial encounter    Other orders  -     naproxen sodium (Aleve) 220 MG tablet; One tab 2x a day for 5 days only with food  Dispense: 30 tablet; Refill: 0           Current Outpatient Medications:     acetaminophen (TYLENOL) 325 MG tablet, TAKE 2 TABLETS BY MOUTH EVERY 4 HOURS AS NEEDED FOR FEVER AND PAIN ( MAX 12 DOSES IN 24 HOURS), Disp: 60 tablet, Rfl: 10    alendronate (FOSAMAX) 70 MG tablet, TAKE 1 TABLET BY MOUTH WEEKLY .TAKE WITH 8OZ WATER BEFORE ANY FOOD OR DRINK. DO NOT LIE DOWN FOR 30 MINUTES, Disp: 4 tablet, Rfl: 0    Allergy 4 MG tablet, TAKE 1 TABLET BY MOUTH 4 TO 6 HOURS AS NEEDED FOR ALLERGIES AND RUNNY NOSE, Disp: 24 tablet, Rfl: 0    Calcium + Vitamin D3 600-10 MG-MCG tablet per tablet, TAKE 1 TABLET BY MOUTH TWICE DAILY, Disp: 60 tablet, Rfl: 0    chlorhexidine (PERIDEX) 0.12 % solution, SWISH AND SPIT WITH A 1/2 OUNCE TWICE DAILY, Disp: 946 mL, Rfl: 0    Colloidal Oatmeal (Aveeno Eczema Therapy) 1 % cream, Apply to dry skin 3 times a day, Disp: 354 each, Rfl: 5    Combigan 0.2-0.5 % ophthalmic solution, , Disp: , Rfl:     dexAMETHasone (DECADRON) 0.1 % ophthalmic solution, ,  Disp: , Rfl:     DOCUSIL 100 MG capsule, TAKE ONE (1) CAPSULE BY MOUTH TWICE A DAY, Disp: 60 capsule, Rfl: 3    famotidine (PEPCID) 20 MG tablet, TAKE 1 TABLET BY MOUTH TWICE DAILY, Disp: 60 tablet, Rfl: 3    fenofibrate (TRICOR) 48 MG tablet, TAKE 1 TABLET BY MOUTH AT BEDTIME, Disp: 30 tablet, Rfl: 0    FeroSul 325 (65 Fe) MG tablet, TAKE 1 TABLET BY MOUTH TWICE DAILY, Disp: 60 tablet, Rfl: 0    GNP Antacid Regular Strength 200-200-20 MG/5ML suspension, , Disp: , Rfl:     GNP Milk of Magnesia 1200 MG/15ML suspension, , Disp: , Rfl:     hydrocortisone 2.5 % cream, , Disp: , Rfl:     lisinopril (PRINIVIL,ZESTRIL) 5 MG tablet, TAKE 1 TABLET BY MOUTH EVERY DAY IF BLOOD PRESSURE OVER 150/80 CAN GIVE ANOTHER 5MG TABLET (RUN 28 FOR DISPILL, WILL ORDER 2ND DOSE PRN), Disp: 60 tablet, Rfl: 0    loperamide (IMODIUM) 2 MG capsule, , Disp: , Rfl:     loratadine (CLARITIN) 10 MG tablet, TAKE 1 TABLET BY MOUTH EVERY DAY, Disp: 30 tablet, Rfl: 0    mupirocin (BACTROBAN) 2 % ointment, APPLY TO RIGHT 5TH TOE TWICE DAILY, Disp: , Rfl:     Neomycin-Bacitracin-Polymyxin (GNP TRIPLE ANTIBIOTIC) 3.5-400-5000 ointment, FOLLOW DIRECTIONS ON LABEL AS NEEDED TO PREVENT INFECTIONS IN MINOR CUTS AND SCRAPES. IF NO IMPROVEMENT IN 48 HOURS NOTIFY NURSE, Disp: 28.4 g, Rfl: 12    nitrofurantoin (Macrodantin) 100 MG capsule, Take 1 capsule by mouth 2 (Two) Times a Day., Disp: 14 capsule, Rfl: 0    omeprazole (priLOSEC) 20 MG capsule, TAKE 1 CAPSULE BY MOUTH EVERY DAY AS NEEDED ( BUBBLE ), Disp: 30 capsule, Rfl: 0    OXcarbazepine (TRILEPTAL) 600 MG tablet, TAKE 1 TABLET BY MOUTH EVERY MORNING AND 2 TABLETS EVERY EVENING, Disp: 90 tablet, Rfl: 10    PHENobarbital (LUMINAL) 97.2 MG tablet, TAKE 1 TABLET BY MOUTH EVERY NIGHT AT BEDTIME (NARC SHEET), Disp: 90 tablet, Rfl: 1    polyethylene glycol (MiraLax) 17 GM/SCOOP powder, Take 17 g by mouth Daily. Mix with water 17g daily with 8oz water prn, Disp: 1 each, Rfl: 2    Psyllium (Metamucil [facility]  Fiber) 55.46 % powder, Take 1 teaspoon(s) by mouth 2 (Two) Times a Day. 1 tsp 1-2 x day as needed, Disp: 1 g, Rfl: 1    risperiDONE (risperDAL) 2 MG tablet, TAKE 1 TABLET BY MOUTH TWICE DAILY, Disp: 58 tablet, Rfl: 5    Robafen 100 MG/5ML liquid, , Disp: , Rfl:     tamsulosin (FLOMAX) 0.4 MG capsule 24 hr capsule, Take 1 capsule by mouth Daily., Disp: , Rfl:     tolnaftate (TINACTIN) 1 % external solution, Apply twice a day to affected area until resolved, Disp: 29.5 mL, Rfl: 2    white petrolatum gel gel, Apply 1 application topically to the appropriate area as directed As Needed (Dry skin). Apply to hands at night and cover with cotton gloves overnight, Disp: 212 g, Rfl: 2    naproxen sodium (Aleve) 220 MG tablet, One tab 2x a day for 5 days only with food, Disp: 30 tablet, Rfl: 0           HORTENCIA Valentine 5/14/2024 12:03 EDT  This note has been electronically signed

## 2024-05-15 ENCOUNTER — TELEPHONE (OUTPATIENT)
Dept: FAMILY MEDICINE CLINIC | Facility: CLINIC | Age: 64
End: 2024-05-15

## 2024-05-15 DIAGNOSIS — S82.022A CLOSED DISPLACED LONGITUDINAL FRACTURE OF LEFT PATELLA, INITIAL ENCOUNTER: Primary | ICD-10-CM

## 2024-05-15 NOTE — TELEPHONE ENCOUNTER
Caller: [facility]    Best call back number: [phone]     SHANDRA IS REQUESTING A CALLBACK FROM THE NURSES AS SOON AS POSSIBLE PLEASE

## 2024-05-15 NOTE — TELEPHONE ENCOUNTER
Just letting you know, I looked in chart and I dont know if you can see more, the xray results from [facility] yesterday say the results are 'not signed yet' and are not in chart?  I know you wont be here tomorrow, is there anything I can do?

## 2024-05-15 NOTE — TELEPHONE ENCOUNTER
I know you already spoke with Cecile on the phone. I told her that you are gone for the rest of the week and MAYBE you can put her request in [employer] for her to give his work.    She is asking for all the instructions you gave her to be documented into [employer] so she can print and give to work.

## 2024-05-15 NOTE — TELEPHONE ENCOUNTER
Caller: [facility]     Best call back number: [phone]     Do you know the name of the person who called: SHANDRA    What was the call regarding: CALLER IS REQUESTING PATIENTS TEST RESULTS FROM YESTERDAY 5/14/24    PLEASE ADVISE

## 2024-05-17 ENCOUNTER — OFFICE VISIT (OUTPATIENT)
Dept: ORTHOPEDIC SURGERY | Facility: CLINIC | Age: 64
End: 2024-05-17
Payer: MEDICARE

## 2024-05-17 VITALS — OXYGEN SATURATION: 98 % | BODY MASS INDEX: 24.33 KG/M2 | WEIGHT: 155 LBS | RESPIRATION RATE: 20 BRPM | HEIGHT: 67 IN

## 2024-05-17 DIAGNOSIS — M25.562 CHRONIC PAIN OF LEFT KNEE: Primary | ICD-10-CM

## 2024-05-17 DIAGNOSIS — S82.025A CLOSED NONDISPLACED LONGITUDINAL FRACTURE OF LEFT PATELLA, INITIAL ENCOUNTER: ICD-10-CM

## 2024-05-17 DIAGNOSIS — G89.29 CHRONIC PAIN OF LEFT KNEE: Primary | ICD-10-CM

## 2024-05-17 NOTE — PROGRESS NOTES
[facility]        Patient Name: Manuel Gaspar  : 1960  Primary Care Physician: Dang Holder APRN        Chief Complaint:    Chief Complaint   Patient presents with    Left Knee - Pain, Initial Evaluation      DOI-2024   Pain         HPI:   Manuel Gaspar is a 63 y.o. year old who presents today for evaluation of left knee pain.    He fell 24 after tripping and landed directly onto the left kneecap. He was able to stand and bear weight. +pain and mild edema. He was taken to ED and imaging revealed a patella fracture. He presents today for ortho evaluation.     Pain is currently controlled with Aleve and/or Tylenol. He is wearing a knee sleeve. His extensor mechanism is intact. He is able to bear weight. He denies paresthesia symptoms.     He presents today with a caregiver from [employer] who contributes to the HPI.       Past Medical/Surgical, Social and Family History:  I have reviewed and/or updated pertinent history as noted in the medical record including:  Past Medical History:   Diagnosis Date    Hearing loss     Hypertension     Kidney stone     Mental retardation     Neurogenic bladder     Seizure disorder     onset 16-17 years old, last seizure about  when weaning phenobarb     Past Surgical History:   Procedure Laterality Date    BLADDER REPAIR      CATARACT EXTRACTION      ORIF PATELLA FRACTURE Left     THORACOTOMY      TYMPANOMASTOIDECTOMY       Social History     Occupational History    Not on file   Tobacco Use    Smoking status: Never     Passive exposure: Never    Smokeless tobacco: Never   Vaping Use    Vaping status: Never Used   Substance and Sexual Activity    Alcohol use: No    Drug use: No    Sexual activity: Never          Allergies:   Allergies   Allergen Reactions    Levofloxacin Other (See Comments)    Sulfamethoxazole-Trimethoprim Other (See Comments)       Medications:   Home Medications:  Current Outpatient Medications on File Prior to Visit   Medication Sig     acetaminophen (TYLENOL) 325 MG tablet TAKE 2 TABLETS BY MOUTH EVERY 4 HOURS AS NEEDED FOR FEVER AND PAIN ( MAX 12 DOSES IN 24 HOURS)    alendronate (FOSAMAX) 70 MG tablet TAKE 1 TABLET BY MOUTH WEEKLY .TAKE WITH 8OZ WATER BEFORE ANY FOOD OR DRINK. DO NOT LIE DOWN FOR 30 MINUTES    Allergy 4 MG tablet TAKE 1 TABLET BY MOUTH 4 TO 6 HOURS AS NEEDED FOR ALLERGIES AND RUNNY NOSE    Calcium + Vitamin D3 600-10 MG-MCG tablet per tablet TAKE 1 TABLET BY MOUTH TWICE DAILY    chlorhexidine (PERIDEX) 0.12 % solution SWISH AND SPIT WITH A 1/2 OUNCE TWICE DAILY    Colloidal Oatmeal (Aveeno Eczema Therapy) 1 % cream Apply to dry skin 3 times a day    Combigan 0.2-0.5 % ophthalmic solution     dexAMETHasone (DECADRON) 0.1 % ophthalmic solution     DOCUSIL 100 MG capsule TAKE ONE (1) CAPSULE BY MOUTH TWICE A DAY    famotidine (PEPCID) 20 MG tablet TAKE 1 TABLET BY MOUTH TWICE DAILY    fenofibrate (TRICOR) 48 MG tablet TAKE 1 TABLET BY MOUTH AT BEDTIME    FeroSul 325 (65 Fe) MG tablet TAKE 1 TABLET BY MOUTH TWICE DAILY    GNP Antacid Regular Strength 200-200-20 MG/5ML suspension     GNP Milk of Magnesia 1200 MG/15ML suspension     hydrocortisone 2.5 % cream     lisinopril (PRINIVIL,ZESTRIL) 5 MG tablet TAKE 1 TABLET BY MOUTH EVERY DAY IF BLOOD PRESSURE OVER 150/80 CAN GIVE ANOTHER 5MG TABLET (RUN 28 FOR DISPILL, WILL ORDER 2ND DOSE PRN)    loperamide (IMODIUM) 2 MG capsule     loratadine (CLARITIN) 10 MG tablet TAKE 1 TABLET BY MOUTH EVERY DAY    mupirocin (BACTROBAN) 2 % ointment APPLY TO RIGHT 5TH TOE TWICE DAILY    naproxen sodium (Aleve) 220 MG tablet One tab 2x a day for 5 days only with food    Neomycin-Bacitracin-Polymyxin (GNP TRIPLE ANTIBIOTIC) 3.5-400-5000 ointment FOLLOW DIRECTIONS ON LABEL AS NEEDED TO PREVENT INFECTIONS IN MINOR CUTS AND SCRAPES. IF NO IMPROVEMENT IN 48 HOURS NOTIFY NURSE    nitrofurantoin (Macrodantin) 100 MG capsule Take 1 capsule by mouth 2 (Two) Times a Day.    omeprazole (priLOSEC) 20 MG capsule  TAKE 1 CAPSULE BY MOUTH EVERY DAY AS NEEDED ( BUBBLE )    OXcarbazepine (TRILEPTAL) 600 MG tablet TAKE 1 TABLET BY MOUTH EVERY MORNING AND 2 TABLETS EVERY EVENING    PHENobarbital (LUMINAL) 97.2 MG tablet TAKE 1 TABLET BY MOUTH EVERY NIGHT AT BEDTIME (NARC SHEET)    polyethylene glycol (MiraLax) 17 GM/SCOOP powder Take 17 g by mouth Daily. Mix with water 17g daily with 8oz water prn    Psyllium (Metamucil [facility] Fiber) 55.46 % powder Take 1 teaspoon(s) by mouth 2 (Two) Times a Day. 1 tsp 1-2 x day as needed    risperiDONE (risperDAL) 2 MG tablet TAKE 1 TABLET BY MOUTH TWICE DAILY    Robafen 100 MG/5ML liquid     tamsulosin (FLOMAX) 0.4 MG capsule 24 hr capsule Take 1 capsule by mouth Daily.    tolnaftate (TINACTIN) 1 % external solution Apply twice a day to affected area until resolved    white petrolatum gel gel Apply 1 application topically to the appropriate area as directed As Needed (Dry skin). Apply to hands at night and cover with cotton gloves overnight     No current facility-administered medications on file prior to visit.         ROS:  Negative unless listed in the HPI    Physical Exam:   63 y.o. male  Body mass index is 24.28 kg/m²., 70.3 kg (155 lb)  Vitals:    05/17/24 0912   Resp: 20   SpO2: 98%     General: Alert, cooperative, appears well and in no observable distress.   HEENT: Normocephalic, atraumatic on external visual inspection. No icterus.   CV: No significant peripheral edema.    Respiratory: Normal respiratory effort.   Skin: Warm & well perfused; appropriate skin turgor.  Psych: Appropriate mood & affect.  Neuro: Gross sensation and motor intact in affected extremity/extremities.  Vascular: Peripheral pulses palpable in affected extremity/extremities.     Ortho Exam   Left knee  Mild effusion is noted with minimal bruising  No erythema or warmth is noted  His extensor mechanism is intact  Extension is measured at neutral and non painful  Tenderness with compression over the patella        Radiology:  X-Ray Report:  Left knee(s) X-Ray  Indication: Evaluation of pain  AP, Lateral views  Findings: longitudinal fractures of the patella on the medial and lateral borders without displacement. Mild effusion  Bony lesion: no  Soft tissues: abnormal  Joint spaces: not examined  Hardware appropriately positioned: not applicable  Prior studies available for comparison: yes         Assessment:  Left patella fracture, non displaced   Body mass index is 24.28 kg/m².  BMI consistent with Normal: 18.5-24.9kg/m2  BMI is within normal parameters. No other follow-up for BMI required.        Plan:  I have reviewed the above imaging and assessment with the patient and his caregiver today in detail  Recommend knee immobilizer to be worn at all times other than showering  WBAT  Tylenol 650 mg po Q6 hr prn pain  BMI reviewed  Follow up in 10 days with repeat knee imaging   Ok to work  Patient encouraged to call with any questions or concerns in the interim    Amarilis Guy, APRN

## 2024-05-19 DIAGNOSIS — N18.9 ANEMIA DUE TO CHRONIC KIDNEY DISEASE, UNSPECIFIED CKD STAGE: Primary | ICD-10-CM

## 2024-05-19 DIAGNOSIS — D63.1 ANEMIA DUE TO CHRONIC KIDNEY DISEASE, UNSPECIFIED CKD STAGE: Primary | ICD-10-CM

## 2024-05-19 DIAGNOSIS — D64.9 ANEMIA, UNSPECIFIED TYPE: ICD-10-CM

## 2024-05-19 RX ORDER — ALENDRONATE SODIUM 70 MG/1
TABLET ORAL
Qty: 4 TABLET | Refills: 0 | Status: SHIPPED | OUTPATIENT
Start: 2024-05-19

## 2024-05-21 RX ORDER — CALCIUM CARBONATE/VITAMIN D3 600 MG-10
TABLET ORAL
Qty: 60 TABLET | Refills: 0 | Status: SHIPPED | OUTPATIENT
Start: 2024-05-21

## 2024-05-21 RX ORDER — FERROUS SULFATE 325(65) MG
TABLET ORAL
Qty: 60 TABLET | Refills: 0 | Status: SHIPPED | OUTPATIENT
Start: 2024-05-21

## 2024-05-21 RX ORDER — LORATADINE 10 MG/1
TABLET ORAL
Qty: 30 TABLET | Refills: 0 | Status: SHIPPED | OUTPATIENT
Start: 2024-05-21

## 2024-05-21 RX ORDER — FENOFIBRATE 48 MG/1
TABLET, COATED ORAL
Qty: 30 TABLET | Refills: 0 | Status: SHIPPED | OUTPATIENT
Start: 2024-05-21

## 2024-05-29 DIAGNOSIS — M25.562 CHRONIC PAIN OF LEFT KNEE: Primary | ICD-10-CM

## 2024-05-29 DIAGNOSIS — G89.29 CHRONIC PAIN OF LEFT KNEE: Primary | ICD-10-CM

## 2024-05-30 ENCOUNTER — OFFICE VISIT (OUTPATIENT)
Age: 64
End: 2024-05-30
Payer: MEDICARE

## 2024-05-30 ENCOUNTER — TELEPHONE (OUTPATIENT)
Dept: ORTHOPEDIC SURGERY | Facility: CLINIC | Age: 64
End: 2024-05-30
Payer: MEDICARE

## 2024-05-30 ENCOUNTER — HOSPITAL ENCOUNTER (OUTPATIENT)
Dept: GENERAL RADIOLOGY | Facility: HOSPITAL | Age: 64
Discharge: HOME | End: 2024-05-30
Admitting: NURSE PRACTITIONER
Payer: MEDICARE

## 2024-05-30 VITALS — HEIGHT: 67 IN | BODY MASS INDEX: 24.33 KG/M2 | WEIGHT: 155 LBS | OXYGEN SATURATION: 96 % | RESPIRATION RATE: 20 BRPM

## 2024-05-30 DIAGNOSIS — S82.025A CLOSED NONDISPLACED LONGITUDINAL FRACTURE OF LEFT PATELLA, INITIAL ENCOUNTER: Primary | ICD-10-CM

## 2024-05-30 PROCEDURE — 73562 X-RAY EXAM OF KNEE 3: CPT

## 2024-05-30 NOTE — TELEPHONE ENCOUNTER
PER JANUSZ GANDHI PATIENT NEEDS XRAY PRIOR TO APPT.    S/W SHANDRA AND ADVISED THAT RAY NEEDS HIS XRAY PRIOR TO HIS APPT. ADVISED THAT XRAY IS SAME BUILDING IN [place] IN THE BASEMENT

## 2024-05-30 NOTE — PROGRESS NOTES
FOLLOW UP VISIT        Patient Name: Manuel Gaspar  : 1960  Primary Care Physician: Dang Holder APRN        Chief Complaint:  Left patellar fracture(s)    HPI:   Manuel Gaspar is a 64 y.o. year old who presents today for follow up of the above. He has been compliant with left knee immobilizer. He denies any significant pain in the left knee. He is bearing weight without difficulty. No new swelling or other symptoms.     He presents today with a caregiver from [employer] who contributes to the HPI.       Past Medical/Surgical, Social and Family History:  I have reviewed and/or updated pertinent history as noted in the medical record including:  Past Medical History:   Diagnosis Date    Hearing loss     Hypertension     Kidney stone     Mental retardation     Neurogenic bladder     Seizure disorder     onset 16-17 years old, last seizure about  when weaning phenobarb     Past Surgical History:   Procedure Laterality Date    BLADDER REPAIR      CATARACT EXTRACTION      ORIF PATELLA FRACTURE Left     THORACOTOMY      TYMPANOMASTOIDECTOMY       Social History     Occupational History    Not on file   Tobacco Use    Smoking status: Never     Passive exposure: Never    Smokeless tobacco: Never   Vaping Use    Vaping status: Never Used   Substance and Sexual Activity    Alcohol use: No    Drug use: No    Sexual activity: Never      Social History     Social History Narrative    Not on file     Family History   Family history unknown: Yes       Allergies:   Allergies   Allergen Reactions    Levofloxacin Other (See Comments)    Sulfamethoxazole-Trimethoprim Other (See Comments)       Medications:   Home Medications:  Current Outpatient Medications on File Prior to Visit   Medication Sig    acetaminophen (TYLENOL) 325 MG tablet TAKE 2 TABLETS BY MOUTH EVERY 4 HOURS AS NEEDED FOR FEVER AND PAIN ( MAX 12 DOSES IN 24 HOURS)    alendronate (FOSAMAX) 70 MG tablet TAKE 1 TABLET BY MOUTH WEEKLY .TAKE WITH 8OZ WATER  BEFORE ANY FOOD OR DRINK. DO NOT LIE DOWN FOR 30 MINUTES    Allergy 4 MG tablet TAKE 1 TABLET BY MOUTH 4 TO 6 HOURS AS NEEDED FOR ALLERGIES AND RUNNY NOSE    Calcium + Vitamin D3 600-10 MG-MCG tablet per tablet TAKE 1 TABLET BY MOUTH TWICE DAILY    chlorhexidine (PERIDEX) 0.12 % solution SWISH AND SPIT WITH A 1/2 OUNCE TWICE DAILY    Colloidal Oatmeal (Aveeno Eczema Therapy) 1 % cream Apply to dry skin 3 times a day    Combigan 0.2-0.5 % ophthalmic solution     dexAMETHasone (DECADRON) 0.1 % ophthalmic solution     DOCUSIL 100 MG capsule TAKE ONE (1) CAPSULE BY MOUTH TWICE A DAY    famotidine (PEPCID) 20 MG tablet TAKE 1 TABLET BY MOUTH TWICE DAILY    fenofibrate (TRICOR) 48 MG tablet TAKE 1 TABLET BY MOUTH AT BEDTIME    FeroSul 325 (65 Fe) MG tablet TAKE 1 TABLET BY MOUTH TWICE DAILY    GNP Antacid Regular Strength 200-200-20 MG/5ML suspension     GNP Milk of Magnesia 1200 MG/15ML suspension     hydrocortisone 2.5 % cream     lisinopril (PRINIVIL,ZESTRIL) 5 MG tablet TAKE 1 TABLET BY MOUTH EVERY DAY IF BLOOD PRESSURE OVER 150/80 CAN GIVE ANOTHER 5MG TABLET (RUN 28 FOR DISPILL, WILL ORDER 2ND DOSE PRN)    loperamide (IMODIUM) 2 MG capsule     loratadine (CLARITIN) 10 MG tablet TAKE 1 TABLET BY MOUTH EVERY DAY    mupirocin (BACTROBAN) 2 % ointment APPLY TO RIGHT 5TH TOE TWICE DAILY    naproxen sodium (Aleve) 220 MG tablet One tab 2x a day for 5 days only with food    Neomycin-Bacitracin-Polymyxin (GNP TRIPLE ANTIBIOTIC) 3.5-400-5000 ointment FOLLOW DIRECTIONS ON LABEL AS NEEDED TO PREVENT INFECTIONS IN MINOR CUTS AND SCRAPES. IF NO IMPROVEMENT IN 48 HOURS NOTIFY NURSE    nitrofurantoin (Macrodantin) 100 MG capsule Take 1 capsule by mouth 2 (Two) Times a Day.    omeprazole (priLOSEC) 20 MG capsule TAKE 1 CAPSULE BY MOUTH EVERY DAY AS NEEDED ( BUBBLE )    OXcarbazepine (TRILEPTAL) 600 MG tablet TAKE 1 TABLET BY MOUTH EVERY MORNING AND 2 TABLETS EVERY EVENING    PHENobarbital (LUMINAL) 97.2 MG tablet TAKE 1 TABLET BY  MOUTH EVERY NIGHT AT BEDTIME (NARC SHEET)    polyethylene glycol (MiraLax) 17 GM/SCOOP powder Take 17 g by mouth Daily. Mix with water 17g daily with 8oz water prn    Psyllium (Metamucil MultiHealth Fiber) 55.46 % powder Take 1 teaspoon(s) by mouth 2 (Two) Times a Day. 1 tsp 1-2 x day as needed    risperiDONE (risperDAL) 2 MG tablet TAKE 1 TABLET BY MOUTH TWICE DAILY    Robafen 100 MG/5ML liquid     tamsulosin (FLOMAX) 0.4 MG capsule 24 hr capsule Take 1 capsule by mouth Daily.    tolnaftate (TINACTIN) 1 % external solution Apply twice a day to affected area until resolved    white petrolatum gel gel Apply 1 application topically to the appropriate area as directed As Needed (Dry skin). Apply to hands at night and cover with cotton gloves overnight     No current facility-administered medications on file prior to visit.         ROS:  14 point review of systems was negative except as listed in the HPI     Physical Exam:   64 y.o. male  Body mass index is 24.28 kg/m²., 70.3 kg (155 lb)  Vitals:    05/30/24 1034   Resp: 20   SpO2: 96%         General: Alert, cooperative, appears well and in no observable distress.   HEENT: Normocephalic, atraumatic on external visual inspection. No icterus.   CV: No significant peripheral edema.   Respiratory: Normal respiratory effort.   Skin: Warm & well perfused; appropriate skin turgor.  Psych: Appropriate mood & affect.  Neuro: Gross sensation and motor intact in affected extremity/extremities.  Vascular: Peripheral pulses palpable in affected extremity/extremities. Calves/compartments soft and non tender, no evidence of DVT or compartment syndrome.    Ortho Exam      Left knee  No significant swelling, bruising. No abrasions   Mild tenderness with patellar compression    Investigations:  X-Ray Report:  Left knee(s) X-Ray  Indication: Evaluation of fracture healing  AP, Lateral views  Findings: longitudinal fracture of the patella on the medial and lateral borders remain stable.  No significant change since previous imaging  Bony lesion: no  Soft tissues: within normal limits  Joint spaces: not examined  Hardware appropriately positioned: not applicable  Prior studies available for comparison: yes                  Assessment:  Left patellar fracture(s), non displaced   Body mass index is 24.28 kg/m².  BMI consistent with Normal: 18.5-24.9kg/m2  BMI is within normal parameters. No other follow-up for BMI required.          Plan:  Reviewed the above imaging with Ray and his caregiver today  Recommend continuation of left knee immobilizer  WBAT  Tylenol/Advil PRN for pain  Follow up in 2 weeks with repeat imaging prior   Patient encouraged to call with questions or concerns in the interim      Amarilis Guy, HORTENCIA

## 2024-05-30 NOTE — TELEPHONE ENCOUNTER
Hub staff attempted to follow warm transfer process and was unsuccessful     Caller: SHANDRA CALHOUN    Relationship to patient: [facility]    Best call back number: [phone]    Caller is needing: SHANDRA CALHOUN WITH [facility] WAS RETURNING A CALL FROM YOUR OFFICE. I SPOKE WITH LATONYA AT THE FRONT OFFICE & WAS ADVISED TO SEND A TELEPHONE ENCOUNTER SO SHE COULD SEND THE MESSAGE TO [place]. SHANDRA MANAGES [facility] AND WOULD LIKE A CALL BACK ASA DUE TO PATIENT HAVING AN APPT THIS MORNING. THANK YOU!
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
## 2024-06-11 ENCOUNTER — CONSULT (OUTPATIENT)
Dept: ONCOLOGY | Facility: CLINIC | Age: 64
End: 2024-06-11
Payer: MEDICARE

## 2024-06-11 VITALS
BODY MASS INDEX: 24.48 KG/M2 | HEART RATE: 65 BPM | OXYGEN SATURATION: 100 % | WEIGHT: 156 LBS | HEIGHT: 67 IN | TEMPERATURE: 97.1 F

## 2024-06-11 DIAGNOSIS — D64.9 ANEMIA, UNSPECIFIED TYPE: Primary | ICD-10-CM

## 2024-06-11 PROCEDURE — 99203 OFFICE O/P NEW LOW 30 MIN: CPT | Performed by: INTERNAL MEDICINE

## 2024-06-11 PROCEDURE — 1126F AMNT PAIN NOTED NONE PRSNT: CPT | Performed by: INTERNAL MEDICINE

## 2024-06-11 PROCEDURE — 1159F MED LIST DOCD IN RCRD: CPT | Performed by: INTERNAL MEDICINE

## 2024-06-11 PROCEDURE — 1160F RVW MEDS BY RX/DR IN RCRD: CPT | Performed by: INTERNAL MEDICINE

## 2024-06-12 ENCOUNTER — OFFICE VISIT (OUTPATIENT)
Dept: ORTHOPEDIC SURGERY | Facility: CLINIC | Age: 64
End: 2024-06-12
Payer: MEDICARE

## 2024-06-12 VITALS — BODY MASS INDEX: 24.48 KG/M2 | WEIGHT: 156 LBS | OXYGEN SATURATION: 97 % | HEIGHT: 67 IN | RESPIRATION RATE: 20 BRPM

## 2024-06-12 DIAGNOSIS — S82.025A CLOSED NONDISPLACED LONGITUDINAL FRACTURE OF LEFT PATELLA, INITIAL ENCOUNTER: Primary | ICD-10-CM

## 2024-06-13 LAB
ALBUMIN SERPL ELPH-MCNC: 3.7 G/DL (ref 2.9–4.4)
ALBUMIN/GLOB SERPL: 1.2 {RATIO} (ref 0.7–1.7)
ALPHA1 GLOB SERPL ELPH-MCNC: 0.2 G/DL (ref 0–0.4)
ALPHA2 GLOB SERPL ELPH-MCNC: 0.9 G/DL (ref 0.4–1)
B-GLOBULIN SERPL ELPH-MCNC: 1.1 G/DL (ref 0.7–1.3)
GAMMA GLOB SERPL ELPH-MCNC: 0.9 G/DL (ref 0.4–1.8)
GLOBULIN SER CALC-MCNC: 3.2 G/DL (ref 2.2–3.9)
HAPTOGLOB SERPL-MCNC: 152 MG/DL (ref 32–363)
IGA SERPL-MCNC: 452 MG/DL (ref 61–437)
IGG SERPL-MCNC: 945 MG/DL (ref 603–1613)
IGM SERPL-MCNC: 151 MG/DL (ref 20–172)
KAPPA LC FREE SER-MCNC: 55.9 MG/L (ref 3.3–19.4)
KAPPA LC FREE/LAMBDA FREE SER: 1.77 {RATIO} (ref 0.26–1.65)
LABORATORY COMMENT REPORT: NORMAL
LAMBDA LC FREE SERPL-MCNC: 31.5 MG/L (ref 5.7–26.3)
LDH SERPL L TO P-CCNC: 196 IU/L (ref 121–224)
M PROTEIN SERPL ELPH-MCNC: NORMAL G/DL
PROT PATTERN SERPL IFE-IMP: ABNORMAL
PROT SERPL-MCNC: 6.9 G/DL (ref 6–8.5)
RETICS/RBC NFR AUTO: 1.4 % (ref 0.6–2.6)

## 2024-06-14 NOTE — PROGRESS NOTES
FOLLOW UP VISIT        Patient Name: Manuel Gaspar  : 1960  Primary Care Physician: Dang Holder APRN        Chief Complaint:  left patella fracture(s)    HPI:   Manuel Gaspar is a 64 y.o. year old who presents today for follow up of the above. He presents with his caregiver from [employer] who contributes to the medical history.     He has been compliant with his knee immobilizer. He is tolerating it well. He is mobile and has been able to do his job through [employer]. He denies any pain in the left knee today.       Past Medical/Surgical, Social and Family History:  I have reviewed and/or updated pertinent history as noted in the medical record including:  Past Medical History:   Diagnosis Date    Hearing loss     Hypertension     Kidney stone     Mental retardation     Neurogenic bladder     Seizure disorder     onset 16-17 years old, last seizure about  when weaning phenobarb     Past Surgical History:   Procedure Laterality Date    BLADDER REPAIR      CATARACT EXTRACTION      ORIF PATELLA FRACTURE Left     THORACOTOMY      TYMPANOMASTOIDECTOMY       Social History     Occupational History    Not on file   Tobacco Use    Smoking status: Never     Passive exposure: Never    Smokeless tobacco: Never   Vaping Use    Vaping status: Never Used   Substance and Sexual Activity    Alcohol use: No    Drug use: No    Sexual activity: Never      Social History     Social History Narrative    Not on file     Family History   Family history unknown: Yes       Allergies:   Allergies   Allergen Reactions    Levofloxacin Other (See Comments)    Sulfamethoxazole-Trimethoprim Other (See Comments)       Medications:   Home Medications:  Current Outpatient Medications on File Prior to Visit   Medication Sig    acetaminophen (TYLENOL) 325 MG tablet TAKE 2 TABLETS BY MOUTH EVERY 4 HOURS AS NEEDED FOR FEVER AND PAIN ( MAX 12 DOSES IN 24 HOURS)    alendronate (FOSAMAX) 70 MG tablet TAKE 1 TABLET BY MOUTH WEEKLY .TAKE WITH  8OZ WATER BEFORE ANY FOOD OR DRINK. DO NOT LIE DOWN FOR 30 MINUTES    Allergy 4 MG tablet TAKE 1 TABLET BY MOUTH 4 TO 6 HOURS AS NEEDED FOR ALLERGIES AND RUNNY NOSE    Calcium + Vitamin D3 600-10 MG-MCG tablet per tablet TAKE 1 TABLET BY MOUTH TWICE DAILY    chlorhexidine (PERIDEX) 0.12 % solution SWISH AND SPIT WITH A 1/2 OUNCE TWICE DAILY    Colloidal Oatmeal (Aveeno Eczema Therapy) 1 % cream Apply to dry skin 3 times a day    Combigan 0.2-0.5 % ophthalmic solution     dexAMETHasone (DECADRON) 0.1 % ophthalmic solution     DOCUSIL 100 MG capsule TAKE ONE (1) CAPSULE BY MOUTH TWICE A DAY    famotidine (PEPCID) 20 MG tablet TAKE 1 TABLET BY MOUTH TWICE DAILY    fenofibrate (TRICOR) 48 MG tablet TAKE 1 TABLET BY MOUTH AT BEDTIME    FeroSul 325 (65 Fe) MG tablet TAKE 1 TABLET BY MOUTH TWICE DAILY    GNP Antacid Regular Strength 200-200-20 MG/5ML suspension     GNP Milk of Magnesia 1200 MG/15ML suspension     hydrocortisone 2.5 % cream     lisinopril (PRINIVIL,ZESTRIL) 5 MG tablet TAKE 1 TABLET BY MOUTH EVERY DAY IF BLOOD PRESSURE OVER 150/80 CAN GIVE ANOTHER 5MG TABLET (RUN 28 FOR DISPILL, WILL ORDER 2ND DOSE PRN)    loperamide (IMODIUM) 2 MG capsule     loratadine (CLARITIN) 10 MG tablet TAKE 1 TABLET BY MOUTH EVERY DAY    mupirocin (BACTROBAN) 2 % ointment APPLY TO RIGHT 5TH TOE TWICE DAILY    naproxen sodium (Aleve) 220 MG tablet One tab 2x a day for 5 days only with food    Neomycin-Bacitracin-Polymyxin (GNP TRIPLE ANTIBIOTIC) 3.5-400-5000 ointment FOLLOW DIRECTIONS ON LABEL AS NEEDED TO PREVENT INFECTIONS IN MINOR CUTS AND SCRAPES. IF NO IMPROVEMENT IN 48 HOURS NOTIFY NURSE    nitrofurantoin (Macrodantin) 100 MG capsule Take 1 capsule by mouth 2 (Two) Times a Day.    omeprazole (priLOSEC) 20 MG capsule TAKE 1 CAPSULE BY MOUTH EVERY DAY AS NEEDED ( BUBBLE )    OXcarbazepine (TRILEPTAL) 600 MG tablet TAKE 1 TABLET BY MOUTH EVERY MORNING AND 2 TABLETS EVERY EVENING    PHENobarbital (LUMINAL) 97.2 MG tablet TAKE 1  TABLET BY MOUTH EVERY NIGHT AT BEDTIME (NARC SHEET)    polyethylene glycol (MiraLax) 17 GM/SCOOP powder Take 17 g by mouth Daily. Mix with water 17g daily with 8oz water prn    Psyllium (Metamucil MultiHealth Fiber) 55.46 % powder Take 1 teaspoon(s) by mouth 2 (Two) Times a Day. 1 tsp 1-2 x day as needed    risperiDONE (risperDAL) 2 MG tablet TAKE 1 TABLET BY MOUTH TWICE DAILY    Robafen 100 MG/5ML liquid     tamsulosin (FLOMAX) 0.4 MG capsule 24 hr capsule Take 1 capsule by mouth Daily.    tolnaftate (TINACTIN) 1 % external solution Apply twice a day to affected area until resolved    white petrolatum gel gel Apply 1 application topically to the appropriate area as directed As Needed (Dry skin). Apply to hands at night and cover with cotton gloves overnight     No current facility-administered medications on file prior to visit.         ROS:  14 point review of systems was negative except as listed in the HPI     Physical Exam:   64 y.o. male  Body mass index is 24.43 kg/m²., 70.8 kg (156 lb)  Vitals:    06/12/24 0904   Resp: 20   SpO2: 97%         General: Alert, cooperative, appears well and in no observable distress.   HEENT: Normocephalic, atraumatic on external visual inspection. No icterus.   CV: No significant peripheral edema.   Respiratory: Normal respiratory effort.   Skin: Warm & well perfused; appropriate skin turgor.  Psych: Appropriate mood & affect.  Neuro: Gross sensation and motor intact in affected extremity/extremities.  Vascular: Peripheral pulses palpable in affected extremity/extremities. Calves/compartments soft and non tender, no evidence of DVT or compartment syndrome.    Ortho Exam      Left knee  No edema, bruising or erythema  Extensor mechanism intact  ROM not assessed today      Investigations:  X-Ray Report:  Left knee(s) X-Ray  Indication: Evaluation of patella fracture  AP, Lateral views  Findings: stable, minimally displaced fracture. No significant healing   Bony lesion: no  Soft  tissues: within normal limits  Joint spaces: not examined  Hardware appropriately positioned: not applicable  Prior studies available for comparison: yes                  Assessment:  Left patellar fractures, non displaced   Body mass index is 24.43 kg/m².  BMI consistent with Normal: 18.5-24.9kg/m2  BMI is within normal parameters. No other follow-up for BMI required.          Plan:  I have reviewed the above imaging with Ray and his caregiver today  Recommend continuation of knee immobilizer  WBAT  Tylenol/Advil PRN for pain  Recommend follow up imaging in 2 weeks  Plan to begin PT on follow up ([facility])  Patient encouraged to call with questions or concerns in the interim      Amarilis Guy, HORTENCIA

## 2024-06-17 RX ORDER — ALENDRONATE SODIUM 70 MG/1
TABLET ORAL
Qty: 4 TABLET | Refills: 0 | Status: SHIPPED | OUTPATIENT
Start: 2024-06-17

## 2024-06-17 RX ORDER — FERROUS SULFATE 325(65) MG
TABLET ORAL
Qty: 60 TABLET | Refills: 0 | Status: SHIPPED | OUTPATIENT
Start: 2024-06-17

## 2024-06-17 RX ORDER — LORATADINE 10 MG/1
TABLET ORAL
Qty: 30 TABLET | Refills: 0 | Status: SHIPPED | OUTPATIENT
Start: 2024-06-17

## 2024-06-17 RX ORDER — CALCIUM CARBONATE/VITAMIN D3 600 MG-10
TABLET ORAL
Qty: 60 TABLET | Refills: 0 | Status: SHIPPED | OUTPATIENT
Start: 2024-06-17

## 2024-06-17 RX ORDER — FENOFIBRATE 48 MG/1
TABLET, COATED ORAL
Qty: 30 TABLET | Refills: 0 | Status: SHIPPED | OUTPATIENT
Start: 2024-06-17

## 2024-06-18 RX ORDER — LISINOPRIL 5 MG/1
TABLET ORAL
Qty: 60 TABLET | Refills: 0 | Status: SHIPPED | OUTPATIENT
Start: 2024-06-18

## 2024-06-22 RX ORDER — CHLORHEXIDINE GLUCONATE ORAL RINSE 1.2 MG/ML
SOLUTION DENTAL
Qty: 946 ML | Refills: 0 | Status: SHIPPED | OUTPATIENT
Start: 2024-06-22

## 2024-06-26 ENCOUNTER — OFFICE VISIT (OUTPATIENT)
Dept: ORTHOPEDIC SURGERY | Facility: CLINIC | Age: 64
End: 2024-06-26
Payer: MEDICARE

## 2024-06-26 VITALS — RESPIRATION RATE: 20 BRPM | HEIGHT: 67 IN | OXYGEN SATURATION: 96 % | BODY MASS INDEX: 24.48 KG/M2 | WEIGHT: 156 LBS

## 2024-06-26 DIAGNOSIS — S82.025A CLOSED NONDISPLACED LONGITUDINAL FRACTURE OF LEFT PATELLA, INITIAL ENCOUNTER: ICD-10-CM

## 2024-06-26 DIAGNOSIS — G89.29 CHRONIC PAIN OF LEFT KNEE: Primary | ICD-10-CM

## 2024-06-26 DIAGNOSIS — S82.025D CLOSED NONDISPLACED LONGITUDINAL FRACTURE OF LEFT PATELLA WITH ROUTINE HEALING, SUBSEQUENT ENCOUNTER: ICD-10-CM

## 2024-06-26 DIAGNOSIS — M25.562 CHRONIC PAIN OF LEFT KNEE: Primary | ICD-10-CM

## 2024-06-26 PROCEDURE — 99213 OFFICE O/P EST LOW 20 MIN: CPT | Performed by: NURSE PRACTITIONER

## 2024-06-26 NOTE — PROGRESS NOTES
FOLLOW UP VISIT        Patient Name: Manuel Gaspar  : 1960  Primary Care Physician: Dang Holder APRN        Chief Complaint:  Left patella fracture(s)    HPI:   Manuel Gaspar is a 64 y.o. year old who presents today for follow up of the above. He presents with his caregiver from [employer] who contributes to the medical history.     DOI 24    He has continued his knee immobilizer and tolerating. Pain to the left knee is minimal. Ambulating without difficulty. No new injuries.               Past Medical/Surgical, Social and Family History:  I have reviewed and/or updated pertinent history as noted in the medical record including:  Past Medical History:   Diagnosis Date    Hearing loss     Hypertension     Kidney stone     Mental retardation     Neurogenic bladder     Seizure disorder     onset 16-17 years old, last seizure about  when weaning phenobarb     Past Surgical History:   Procedure Laterality Date    BLADDER REPAIR      CATARACT EXTRACTION      ORIF PATELLA FRACTURE Left     THORACOTOMY      TYMPANOMASTOIDECTOMY       Social History     Occupational History    Not on file   Tobacco Use    Smoking status: Never     Passive exposure: Never    Smokeless tobacco: Never   Vaping Use    Vaping status: Never Used   Substance and Sexual Activity    Alcohol use: No    Drug use: No    Sexual activity: Never      Social History     Social History Narrative    Not on file     Family History   Family history unknown: Yes       Allergies:   Allergies   Allergen Reactions    Levofloxacin Other (See Comments)    Sulfamethoxazole-Trimethoprim Other (See Comments)       Medications:   Home Medications:  Current Outpatient Medications on File Prior to Visit   Medication Sig    acetaminophen (TYLENOL) 325 MG tablet TAKE 2 TABLETS BY MOUTH EVERY 4 HOURS AS NEEDED FOR FEVER AND PAIN ( MAX 12 DOSES IN 24 HOURS)    alendronate (FOSAMAX) 70 MG tablet TAKE 1 TABLET BY MOUTH WEEKLY .TAKE WITH 8OZ WATER BEFORE  ANY FOOD OR DRINK. DO NOT LIE DOWN FOR 30 MINUTES    Allergy 4 MG tablet TAKE 1 TABLET BY MOUTH 4 TO 6 HOURS AS NEEDED FOR ALLERGIES AND RUNNY NOSE    Calcium + Vitamin D3 600-10 MG-MCG tablet per tablet TAKE 1 TABLET BY MOUTH TWICE DAILY    chlorhexidine (PERIDEX) 0.12 % solution SWISH AND SPIT WITH A 1/2 OUNCE TWICE DAILY    Colloidal Oatmeal (Aveeno Eczema Therapy) 1 % cream Apply to dry skin 3 times a day    Combigan 0.2-0.5 % ophthalmic solution     dexAMETHasone (DECADRON) 0.1 % ophthalmic solution     DOCUSIL 100 MG capsule TAKE ONE (1) CAPSULE BY MOUTH TWICE A DAY    famotidine (PEPCID) 20 MG tablet TAKE 1 TABLET BY MOUTH TWICE DAILY    fenofibrate (TRICOR) 48 MG tablet TAKE 1 TABLET BY MOUTH AT BEDTIME    FeroSul 325 (65 Fe) MG tablet TAKE 1 TABLET BY MOUTH TWICE DAILY    GNP Antacid Regular Strength 200-200-20 MG/5ML suspension     GNP Milk of Magnesia 1200 MG/15ML suspension     hydrocortisone 2.5 % cream     lisinopril (PRINIVIL,ZESTRIL) 5 MG tablet TAKE 1 TABLET BY MOUTH EVERY DAY IF BLOOD PRESSURE OVER 150/80 CAN GIVE ANOTHER 5MG TABLET (RUN 28 FOR DISPILL, WILL ORDER 2ND DOSE PRN)    loperamide (IMODIUM) 2 MG capsule     loratadine (CLARITIN) 10 MG tablet TAKE 1 TABLET BY MOUTH EVERY DAY    mupirocin (BACTROBAN) 2 % ointment APPLY TO RIGHT 5TH TOE TWICE DAILY    naproxen sodium (Aleve) 220 MG tablet One tab 2x a day for 5 days only with food    Neomycin-Bacitracin-Polymyxin (GNP TRIPLE ANTIBIOTIC) 3.5-400-5000 ointment FOLLOW DIRECTIONS ON LABEL AS NEEDED TO PREVENT INFECTIONS IN MINOR CUTS AND SCRAPES. IF NO IMPROVEMENT IN 48 HOURS NOTIFY NURSE    nitrofurantoin (Macrodantin) 100 MG capsule Take 1 capsule by mouth 2 (Two) Times a Day.    omeprazole (priLOSEC) 20 MG capsule TAKE 1 CAPSULE BY MOUTH EVERY DAY AS NEEDED ( BUBBLE )    OXcarbazepine (TRILEPTAL) 600 MG tablet TAKE 1 TABLET BY MOUTH EVERY MORNING AND 2 TABLETS EVERY EVENING    PHENobarbital (LUMINAL) 97.2 MG tablet TAKE 1 TABLET BY MOUTH  EVERY NIGHT AT BEDTIME (NARC SHEET)    polyethylene glycol (MiraLax) 17 GM/SCOOP powder Take 17 g by mouth Daily. Mix with water 17g daily with 8oz water prn    Psyllium (Metamucil [facility] Fiber) 55.46 % powder Take 1 teaspoon(s) by mouth 2 (Two) Times a Day. 1 tsp 1-2 x day as needed    risperiDONE (risperDAL) 2 MG tablet TAKE 1 TABLET BY MOUTH TWICE DAILY    Robafen 100 MG/5ML liquid     tamsulosin (FLOMAX) 0.4 MG capsule 24 hr capsule Take 1 capsule by mouth Daily.    tolnaftate (TINACTIN) 1 % external solution Apply twice a day to affected area until resolved    white petrolatum gel gel Apply 1 application topically to the appropriate area as directed As Needed (Dry skin). Apply to hands at night and cover with cotton gloves overnight     No current facility-administered medications on file prior to visit.         ROS:  14 point review of systems was negative except as listed in the HPI     Physical Exam:   64 y.o. male  Body mass index is 24.43 kg/m²., 70.8 kg (156 lb)  Vitals:    06/26/24 0808   Resp: 20   SpO2: 96%         General: Alert, cooperative, appears well and in no observable distress.   HEENT: Normocephalic, atraumatic on external visual inspection. No icterus.   CV: No significant peripheral edema.   Respiratory: Normal respiratory effort.   Skin: Warm & well perfused; appropriate skin turgor.  Psych: Appropriate mood & affect.  Neuro: Gross sensation and motor intact in affected extremity/extremities.  Vascular: Peripheral pulses palpable in affected extremity/extremities. Calves/compartments soft and non tender, no evidence of DVT or compartment syndrome.             Investigations:  X-Ray Report:  Left knee(s) X-Ray  Indication: Evaluation of fracture healing  AP, Lateral views  Findings: stable to improved patella fractures with indications of interval healing response   Bony lesion: no  Soft tissues: within normal limits  Joint spaces: not examined  Hardware appropriately positioned: not  applicable  Prior studies available for comparison: yes                  Assessment:  Left patella fracture   Body mass index is 24.43 kg/m².  BMI consistent with Normal: 18.5-24.9kg/m2  BMI is within normal parameters. No other follow-up for BMI required.          Plan:  Patient has remained in immobilizer for 4-5 weeks  He remains asymptomatic with stable to improved imaging  DC immobilizer  WBAT and ROM as tolerated  Refer to physical therapy - [facility]  Follow up in 2 months with repeat knee imaging  Patient encouraged to call with questions or concerns in the interim      HORTENCIA Vincent
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
## 2024-07-13 DIAGNOSIS — G40.109 SEIZURE, TEMPORAL LOBE: ICD-10-CM

## 2024-07-15 RX ORDER — CALCIUM CARBONATE/VITAMIN D3 600 MG-10
TABLET ORAL
Qty: 60 TABLET | Refills: 0 | Status: SHIPPED | OUTPATIENT
Start: 2024-07-15

## 2024-07-15 RX ORDER — LORATADINE 10 MG/1
TABLET ORAL
Qty: 30 TABLET | Refills: 0 | Status: SHIPPED | OUTPATIENT
Start: 2024-07-15

## 2024-07-15 RX ORDER — FERROUS SULFATE 325(65) MG
TABLET ORAL
Qty: 60 TABLET | Refills: 0 | Status: SHIPPED | OUTPATIENT
Start: 2024-07-15

## 2024-07-15 RX ORDER — PHENOBARBITAL 97.2 MG/1
TABLET ORAL
Qty: 90 TABLET | Refills: 3 | Status: SHIPPED | OUTPATIENT
Start: 2024-07-15

## 2024-07-15 RX ORDER — ALENDRONATE SODIUM 70 MG/1
TABLET ORAL
Qty: 4 TABLET | Refills: 0 | Status: SHIPPED | OUTPATIENT
Start: 2024-07-15

## 2024-07-15 RX ORDER — FENOFIBRATE 48 MG/1
TABLET, COATED ORAL
Qty: 30 TABLET | Refills: 0 | Status: SHIPPED | OUTPATIENT
Start: 2024-07-15

## 2024-07-16 ENCOUNTER — OFFICE VISIT (OUTPATIENT)
Dept: ONCOLOGY | Facility: CLINIC | Age: 64
End: 2024-07-16
Payer: MEDICARE

## 2024-07-16 VITALS
HEIGHT: 67 IN | TEMPERATURE: 98.2 F | WEIGHT: 156 LBS | OXYGEN SATURATION: 98 % | HEART RATE: 75 BPM | BODY MASS INDEX: 24.48 KG/M2

## 2024-07-16 DIAGNOSIS — D64.9 ANEMIA, UNSPECIFIED TYPE: Primary | ICD-10-CM

## 2024-07-16 PROCEDURE — 1160F RVW MEDS BY RX/DR IN RCRD: CPT | Performed by: INTERNAL MEDICINE

## 2024-07-16 PROCEDURE — 99213 OFFICE O/P EST LOW 20 MIN: CPT | Performed by: INTERNAL MEDICINE

## 2024-07-16 PROCEDURE — 1159F MED LIST DOCD IN RCRD: CPT | Performed by: INTERNAL MEDICINE

## 2024-07-16 PROCEDURE — 1126F AMNT PAIN NOTED NONE PRSNT: CPT | Performed by: INTERNAL MEDICINE

## 2024-07-17 LAB
BASOPHILS # BLD AUTO: 0 X10E3/UL (ref 0–0.2)
BASOPHILS NFR BLD AUTO: 1 %
EOSINOPHIL # BLD AUTO: 0.3 X10E3/UL (ref 0–0.4)
EOSINOPHIL NFR BLD AUTO: 6 %
ERYTHROCYTE [DISTWIDTH] IN BLOOD BY AUTOMATED COUNT: 11.8 % (ref 11.6–15.4)
HCT VFR BLD AUTO: 33 % (ref 37.5–51)
HGB BLD-MCNC: 11.3 G/DL (ref 13–17.7)
IMM GRANULOCYTES # BLD AUTO: 0 X10E3/UL (ref 0–0.1)
IMM GRANULOCYTES NFR BLD AUTO: 0 %
LYMPHOCYTES # BLD AUTO: 1.1 X10E3/UL (ref 0.7–3.1)
LYMPHOCYTES NFR BLD AUTO: 21 %
MCH RBC QN AUTO: 33 PG (ref 26.6–33)
MCHC RBC AUTO-ENTMCNC: 34.2 G/DL (ref 31.5–35.7)
MCV RBC AUTO: 97 FL (ref 79–97)
MONOCYTES # BLD AUTO: 0.8 X10E3/UL (ref 0.1–0.9)
MONOCYTES NFR BLD AUTO: 15 %
NEUTROPHILS # BLD AUTO: 3 X10E3/UL (ref 1.4–7)
NEUTROPHILS NFR BLD AUTO: 57 %
PLATELET # BLD AUTO: 219 X10E3/UL (ref 150–450)
RBC # BLD AUTO: 3.42 X10E6/UL (ref 4.14–5.8)
WBC # BLD AUTO: 5.2 X10E3/UL (ref 3.4–10.8)

## 2024-07-23 RX ORDER — CHLORHEXIDINE GLUCONATE ORAL RINSE 1.2 MG/ML
SOLUTION DENTAL
Qty: 946 ML | Refills: 0 | Status: SHIPPED | OUTPATIENT
Start: 2024-07-23

## 2024-07-23 RX ORDER — LISINOPRIL 5 MG/1
TABLET ORAL
Qty: 60 TABLET | Refills: 0 | Status: SHIPPED | OUTPATIENT
Start: 2024-07-23

## 2024-08-06 ENCOUNTER — OFFICE VISIT (OUTPATIENT)
Dept: FAMILY MEDICINE CLINIC | Facility: CLINIC | Age: 64
End: 2024-08-06
Payer: MEDICARE

## 2024-08-06 VITALS
BODY MASS INDEX: 24.01 KG/M2 | WEIGHT: 153 LBS | TEMPERATURE: 97.1 F | HEART RATE: 68 BPM | OXYGEN SATURATION: 97 % | DIASTOLIC BLOOD PRESSURE: 60 MMHG | SYSTOLIC BLOOD PRESSURE: 98 MMHG | HEIGHT: 67 IN

## 2024-08-06 DIAGNOSIS — S82.092D CLOSED SLEEVE FRACTURE OF LEFT PATELLA WITH ROUTINE HEALING: ICD-10-CM

## 2024-08-06 DIAGNOSIS — R79.89 SERUM CREATININE RAISED: ICD-10-CM

## 2024-08-06 DIAGNOSIS — E87.1 HYPONATREMIA: Primary | ICD-10-CM

## 2024-08-06 DIAGNOSIS — R30.0 DYSURIA: ICD-10-CM

## 2024-08-06 DIAGNOSIS — E78.2 MIXED HYPERLIPIDEMIA: ICD-10-CM

## 2024-08-06 DIAGNOSIS — N30.01 ACUTE CYSTITIS WITH HEMATURIA: ICD-10-CM

## 2024-08-06 DIAGNOSIS — M80.80XD OTHER OSTEOPOROSIS WITH CURRENT PATHOLOGICAL FRACTURE WITH ROUTINE HEALING, SUBSEQUENT ENCOUNTER: ICD-10-CM

## 2024-08-06 DIAGNOSIS — M81.0 AGE-RELATED OSTEOPOROSIS WITHOUT CURRENT PATHOLOGICAL FRACTURE: ICD-10-CM

## 2024-08-06 DIAGNOSIS — Z00.00 PREVENTATIVE HEALTH CARE: ICD-10-CM

## 2024-08-06 LAB
BILIRUB BLD-MCNC: NEGATIVE MG/DL
CLARITY, POC: CLEAR
COLOR UR: YELLOW
EXPIRATION DATE: ABNORMAL
GLUCOSE UR STRIP-MCNC: NEGATIVE MG/DL
KETONES UR QL: NEGATIVE
LEUKOCYTE EST, POC: ABNORMAL
Lab: ABNORMAL
NITRITE UR-MCNC: NEGATIVE MG/ML
PH UR: 8 [PH] (ref 5–8)
PROT UR STRIP-MCNC: ABNORMAL MG/DL
RBC # UR STRIP: ABNORMAL /UL
SP GR UR: 1.01 (ref 1–1.03)
UROBILINOGEN UR QL: NORMAL

## 2024-08-06 RX ORDER — CEPHALEXIN 500 MG/1
500 CAPSULE ORAL 2 TIMES DAILY
Qty: 20 CAPSULE | Refills: 0 | Status: SHIPPED | OUTPATIENT
Start: 2024-08-06

## 2024-08-06 NOTE — PROGRESS NOTES
"    Manuel Gaspar is a 64 y.o. male.     History of Present Illness  64-year-old white male who lives in a group home with history of MR, anxiety, schizophrenia, osteoporosis, hypertension, kidney stones, chronic anemia, neurogenic bladder, hearing loss, chronic hyponatremia, chronic UTIs, seizure disorder and recent left knee fracture who comes in today for follow-up visit    Blood pressure 98/60 heart rate 68 he denies any chest pain, dyspnea, tachycardia or dizziness    Patient currently going to Ortho he is back to full range of motion with his knee and no limitations and healing processes got well.  He does have osteoporosis and is past due on his DEXA scan we will order that at [place] and he will keep his appointment with Ortho.  He has completed physical therapy    Patient has frequent UTIs but is normally not symptoms traumatic.  The routine urinalysis we did today showed UTI we will send in antibiotics and patient does see urology quite frequently    Weight is 153 with a BMI of 24 which is stable.  He gets all immunizations at the facility and is up-to-date.  He will see urology for his PSAs and is up-to-date on colonoscopy.  Will be ordering DEXA scan                   The following portions of the patient's history were reviewed and updated as appropriate: allergies, current medications, past family history, past medical history, past social history, past surgical history, and problem list.    Vitals:    08/06/24 0846   BP: 98/60   BP Location: Right arm   Patient Position: Sitting   Cuff Size: Adult   Pulse: 68   Temp: 97.1 °F (36.2 °C)   TempSrc: Infrared   SpO2: 97%   Weight: 69.4 kg (153 lb)   Height: 170.2 cm (67.01\")     Body mass index is 23.96 kg/m².  BMI is within normal parameters. No other follow-up for BMI required.       Past Medical History:   Diagnosis Date    Hearing loss     Hypertension     Kidney stone     Mental retardation     Neurogenic bladder     Seizure disorder     onset 16-17 years " old, last seizure about 2015 when weaning phenobarb     Past Surgical History:   Procedure Laterality Date    BLADDER REPAIR      CATARACT EXTRACTION      ORIF PATELLA FRACTURE Left     THORACOTOMY      TYMPANOMASTOIDECTOMY       Family History   Family history unknown: Yes     Immunization History   Administered Date(s) Administered    COVID-19 (MODERNA) 1st,2nd,3rd Dose Monovalent 01/11/2021, 02/08/2021, 10/17/2021    COVID-19 (MODERNA) Monovalent Original Booster 11/24/2021, 08/18/2022    Flu Vaccine Intradermal Quad 18-64YR 10/05/2018, 11/19/2021    Flu Vaccine Quad PF 6-35MO 11/23/2016    Flu Vaccine Quad PF >36MO 11/23/2016, 11/12/2019    Fluzone (or Fluarix & Flulaval for VFC) >6mos 10/21/2020, 11/19/2021, 10/21/2022    Fluzone Quad >6mos (Multi-dose) 10/06/2015    H1N1 Inj 12/03/2009    Hepatitis A 05/23/2018, 11/16/2018    Influenza Inj MDCK Preserative Free 10/05/2018    Influenza Injectable Mdck Pf Quad 11/20/2019    Influenza Quad Vaccine (Inpatient) 10/26/2017    Influenza Seasonal Injectable 10/06/2015, 03/14/2017    Influenza Whole 12/07/1999    PPD Test 11/06/2002, 11/06/2007, 11/10/2009, 11/12/2010, 11/15/2011, 11/27/2012, 12/03/2013, 11/05/2014, 11/03/2015, 01/04/2016, 07/12/2017, 06/13/2018, 06/05/2019, 10/20/2020, 10/19/2021, 11/01/2022, 01/23/2024    Td (TDVAX) 06/13/2014    Tdap 05/22/2009       Orders Only on 07/16/2024   Component Date Value Ref Range Status    WBC 07/16/2024 5.2  3.4 - 10.8 x10E3/uL Final    RBC 07/16/2024 3.42 (L)  4.14 - 5.80 x10E6/uL Final    Hemoglobin 07/16/2024 11.3 (L)  13.0 - 17.7 g/dL Final    Hematocrit 07/16/2024 33.0 (L)  37.5 - 51.0 % Final    MCV 07/16/2024 97  79 - 97 fL Final    MCH 07/16/2024 33.0  26.6 - 33.0 pg Final    MCHC 07/16/2024 34.2  31.5 - 35.7 g/dL Final    RDW 07/16/2024 11.8  11.6 - 15.4 % Final    Platelets 07/16/2024 219  150 - 450 x10E3/uL Final    Neutrophil Rel % 07/16/2024 57  Not Estab. % Final    Lymphocyte Rel % 07/16/2024 21  Not  Estab. % Final    Monocyte Rel % 07/16/2024 15  Not Estab. % Final    Eosinophil Rel % 07/16/2024 6  Not Estab. % Final    Basophil Rel % 07/16/2024 1  Not Estab. % Final    Neutrophils Absolute 07/16/2024 3.0  1.4 - 7.0 x10E3/uL Final    Lymphocytes Absolute 07/16/2024 1.1  0.7 - 3.1 x10E3/uL Final    Monocytes Absolute 07/16/2024 0.8  0.1 - 0.9 x10E3/uL Final    Eosinophils Absolute 07/16/2024 0.3  0.0 - 0.4 x10E3/uL Final    Basophils Absolute 07/16/2024 0.0  0.0 - 0.2 x10E3/uL Final    Immature Granulocyte Rel % 07/16/2024 0  Not Estab. % Final    Immature Grans Absolute 07/16/2024 0.0  0.0 - 0.1 x10E3/uL Final         Review of Systems   Constitutional: Negative.    HENT: Negative.     Respiratory: Negative.     Cardiovascular: Negative.    Gastrointestinal: Negative.    Genitourinary: Negative.    Musculoskeletal: Negative.    Skin: Negative.    Neurological: Negative.    Psychiatric/Behavioral: Negative.         Objective   Physical Exam  Constitutional:       Appearance: Normal appearance.   HENT:      Head: Normocephalic.   Cardiovascular:      Rate and Rhythm: Normal rate and regular rhythm.      Pulses: Normal pulses.      Heart sounds: Normal heart sounds.   Pulmonary:      Effort: Pulmonary effort is normal.      Breath sounds: Normal breath sounds.   Abdominal:      General: Bowel sounds are normal.   Musculoskeletal:         General: Normal range of motion.   Skin:     General: Skin is warm.   Neurological:      General: No focal deficit present.      Mental Status: He is alert and oriented to person, place, and time.   Psychiatric:         Mood and Affect: Mood normal.         Behavior: Behavior normal.         Procedures    Assessment & Plan   Diagnoses and all orders for this visit:    1. Hyponatremia (Primary)    2. Serum creatinine raised  -     Comprehensive Metabolic Panel    3. Mixed hyperlipidemia  -     Lipid Panel With LDL / HDL Ratio    4. Preventative health care  -     TSH+Free T4  -      T3    5. Dysuria  -     Urine Culture - Urine, Urine, Clean Catch; Future  -     Urine Culture - Urine, Urine, Clean Catch  -     POC Urinalysis Dipstick, Automated    6. BMI 23.0-23.9, adult    7. Closed sleeve fracture of left patella with routine healing    8. Acute cystitis with hematuria    9. Other osteoporosis with current pathological fracture with routine healing, subsequent encounter  -     DEXA Bone Density Axial; Future    10. Age-related osteoporosis without current pathological fracture  -     DEXA Bone Density Axial; Future    Other orders  -     cephalexin (Keflex) 500 MG capsule; Take 1 capsule by mouth 2 (Two) Times a Day.  Dispense: 20 capsule; Refill: 0           Current Outpatient Medications:     acetaminophen (TYLENOL) 325 MG tablet, TAKE 2 TABLETS BY MOUTH EVERY 4 HOURS AS NEEDED FOR FEVER AND PAIN ( MAX 12 DOSES IN 24 HOURS), Disp: 60 tablet, Rfl: 10    alendronate (FOSAMAX) 70 MG tablet, TAKE 1 TABLET BY MOUTH WEEKLY .TAKE WITH 8OZ WATER BEFORE ANY FOOD OR DRINK. DO NOT LIE DOWN FOR 30 MINUTES, Disp: 4 tablet, Rfl: 0    Allergy 4 MG tablet, TAKE 1 TABLET BY MOUTH 4 TO 6 HOURS AS NEEDED FOR ALLERGIES AND RUNNY NOSE, Disp: 24 tablet, Rfl: 0    Calcium + Vitamin D3 600-10 MG-MCG tablet per tablet, TAKE 1 TABLET BY MOUTH TWICE DAILY, Disp: 60 tablet, Rfl: 0    chlorhexidine (PERIDEX) 0.12 % solution, SWISH AND SPIT WITH A 1/2 OUNCE TWICE DAILY, Disp: 946 mL, Rfl: 0    Colloidal Oatmeal (Aveeno Eczema Therapy) 1 % cream, Apply to dry skin 3 times a day, Disp: 354 each, Rfl: 5    Combigan 0.2-0.5 % ophthalmic solution, , Disp: , Rfl:     dexAMETHasone (DECADRON) 0.1 % ophthalmic solution, , Disp: , Rfl:     DOCUSIL 100 MG capsule, TAKE ONE (1) CAPSULE BY MOUTH TWICE A DAY, Disp: 60 capsule, Rfl: 3    famotidine (PEPCID) 20 MG tablet, TAKE 1 TABLET BY MOUTH TWICE DAILY, Disp: 60 tablet, Rfl: 3    fenofibrate (TRICOR) 48 MG tablet, TAKE 1 TABLET BY MOUTH AT BEDTIME, Disp: 30 tablet, Rfl: 0     FeroSul 325 (65 Fe) MG tablet, TAKE 1 TABLET BY MOUTH TWICE DAILY, Disp: 60 tablet, Rfl: 0    GNP Antacid Regular Strength 200-200-20 MG/5ML suspension, , Disp: , Rfl:     GNP Milk of Magnesia 1200 MG/15ML suspension, , Disp: , Rfl:     hydrocortisone 2.5 % cream, , Disp: , Rfl:     lisinopril (PRINIVIL,ZESTRIL) 5 MG tablet, TAKE 1 TABLET BY MOUTH EVERY DAY IF BLOOD PRESSURE OVER 150/80 CAN GIVE ANOTHER 5MG TABLET (RUN 28 FOR DISPILL, WILL ORDER 2ND DOSE PRN), Disp: 60 tablet, Rfl: 0    loperamide (IMODIUM) 2 MG capsule, , Disp: , Rfl:     loratadine (CLARITIN) 10 MG tablet, TAKE 1 TABLET BY MOUTH EVERY DAY, Disp: 30 tablet, Rfl: 0    mupirocin (BACTROBAN) 2 % ointment, APPLY TO RIGHT 5TH TOE TWICE DAILY, Disp: , Rfl:     naproxen sodium (Aleve) 220 MG tablet, One tab 2x a day for 5 days only with food, Disp: 30 tablet, Rfl: 0    Neomycin-Bacitracin-Polymyxin (GNP TRIPLE ANTIBIOTIC) 3.5-400-5000 ointment, FOLLOW DIRECTIONS ON LABEL AS NEEDED TO PREVENT INFECTIONS IN MINOR CUTS AND SCRAPES. IF NO IMPROVEMENT IN 48 HOURS NOTIFY NURSE, Disp: 28.4 g, Rfl: 12    nitrofurantoin (Macrodantin) 100 MG capsule, Take 1 capsule by mouth 2 (Two) Times a Day., Disp: 14 capsule, Rfl: 0    omeprazole (priLOSEC) 20 MG capsule, TAKE 1 CAPSULE BY MOUTH EVERY DAY AS NEEDED ( BUBBLE ), Disp: 30 capsule, Rfl: 0    OXcarbazepine (TRILEPTAL) 600 MG tablet, TAKE 1 TABLET BY MOUTH EVERY MORNING AND 2 TABLETS EVERY EVENING, Disp: 90 tablet, Rfl: 10    PHENobarbital 97.2 MG tablet, TAKE 1 TABLET BY MOUTH EVERY NIGHT AT BEDTIME (NARC SHEET), Disp: 90 tablet, Rfl: 3    polyethylene glycol (MiraLax) 17 GM/SCOOP powder, Take 17 g by mouth Daily. Mix with water 17g daily with 8oz water prn, Disp: 1 each, Rfl: 2    Psyllium (Metamucil [facility] Fiber) 55.46 % powder, Take 1 teaspoon(s) by mouth 2 (Two) Times a Day. 1 tsp 1-2 x day as needed, Disp: 1 g, Rfl: 1    risperiDONE (risperDAL) 2 MG tablet, TAKE 1 TABLET BY MOUTH TWICE DAILY, Disp: 58  tablet, Rfl: 5    Robafen 100 MG/5ML liquid, , Disp: , Rfl:     tamsulosin (FLOMAX) 0.4 MG capsule 24 hr capsule, Take 1 capsule by mouth Daily., Disp: , Rfl:     tolnaftate (TINACTIN) 1 % external solution, Apply twice a day to affected area until resolved, Disp: 29.5 mL, Rfl: 2    white petrolatum gel gel, Apply 1 application topically to the appropriate area as directed As Needed (Dry skin). Apply to hands at night and cover with cotton gloves overnight, Disp: 212 g, Rfl: 2    cephalexin (Keflex) 500 MG capsule, Take 1 capsule by mouth 2 (Two) Times a Day., Disp: 20 capsule, Rfl: 0           HORTENCIA Valentine 8/6/2024 09:43 EDT  This note has been electronically signed

## 2024-08-07 LAB
ALBUMIN SERPL-MCNC: 4.2 G/DL (ref 3.9–4.9)
ALP SERPL-CCNC: 75 IU/L (ref 44–121)
ALT SERPL-CCNC: 13 IU/L (ref 0–44)
AST SERPL-CCNC: 16 IU/L (ref 0–40)
BILIRUB SERPL-MCNC: 0.3 MG/DL (ref 0–1.2)
BUN SERPL-MCNC: 31 MG/DL (ref 8–27)
BUN/CREAT SERPL: 23 (ref 10–24)
CALCIUM SERPL-MCNC: 10.1 MG/DL (ref 8.6–10.2)
CHLORIDE SERPL-SCNC: 99 MMOL/L (ref 96–106)
CHOLEST SERPL-MCNC: 215 MG/DL (ref 100–199)
CO2 SERPL-SCNC: 23 MMOL/L (ref 20–29)
CREAT SERPL-MCNC: 1.33 MG/DL (ref 0.76–1.27)
EGFRCR SERPLBLD CKD-EPI 2021: 59 ML/MIN/1.73
GLOBULIN SER CALC-MCNC: 2.6 G/DL (ref 1.5–4.5)
GLUCOSE SERPL-MCNC: 85 MG/DL (ref 70–99)
HDLC SERPL-MCNC: 67 MG/DL
LDLC SERPL CALC-MCNC: 127 MG/DL (ref 0–99)
LDLC/HDLC SERPL: 1.9 RATIO (ref 0–3.6)
POTASSIUM SERPL-SCNC: 4.3 MMOL/L (ref 3.5–5.2)
PROT SERPL-MCNC: 6.8 G/DL (ref 6–8.5)
SODIUM SERPL-SCNC: 136 MMOL/L (ref 134–144)
T3 SERPL-MCNC: 90 NG/DL (ref 71–180)
T4 FREE SERPL-MCNC: 0.83 NG/DL (ref 0.82–1.77)
TRIGL SERPL-MCNC: 117 MG/DL (ref 0–149)
TSH SERPL DL<=0.005 MIU/L-ACNC: 1.55 UIU/ML (ref 0.45–4.5)
VLDLC SERPL CALC-MCNC: 21 MG/DL (ref 5–40)

## 2024-08-08 LAB
BACTERIA UR CULT: NORMAL
BACTERIA UR CULT: NORMAL

## 2024-08-10 RX ORDER — FERROUS SULFATE 325(65) MG
TABLET ORAL
Qty: 60 TABLET | Refills: 0 | Status: SHIPPED | OUTPATIENT
Start: 2024-08-10

## 2024-08-10 RX ORDER — ALENDRONATE SODIUM 70 MG/1
TABLET ORAL
Qty: 4 TABLET | Refills: 0 | Status: SHIPPED | OUTPATIENT
Start: 2024-08-10

## 2024-08-10 RX ORDER — FAMOTIDINE 20 MG/1
TABLET, FILM COATED ORAL
Qty: 60 TABLET | Refills: 2 | Status: SHIPPED | OUTPATIENT
Start: 2024-08-10

## 2024-08-10 RX ORDER — FENOFIBRATE 48 MG/1
TABLET, COATED ORAL
Qty: 30 TABLET | Refills: 0 | Status: SHIPPED | OUTPATIENT
Start: 2024-08-10

## 2024-08-10 RX ORDER — LORATADINE 10 MG/1
TABLET ORAL
Qty: 30 TABLET | Refills: 0 | Status: SHIPPED | OUTPATIENT
Start: 2024-08-10

## 2024-08-10 RX ORDER — CALCIUM CARBONATE/VITAMIN D3 600 MG-10
TABLET ORAL
Qty: 60 TABLET | Refills: 0 | Status: SHIPPED | OUTPATIENT
Start: 2024-08-10

## 2024-08-18 RX ORDER — CHLORHEXIDINE GLUCONATE ORAL RINSE 1.2 MG/ML
SOLUTION DENTAL
Qty: 946 ML | Refills: 0 | Status: SHIPPED | OUTPATIENT
Start: 2024-08-18

## 2024-08-19 RX ORDER — COVID-19 ANTIGEN TEST
KIT MISCELLANEOUS
Qty: 2 KIT | Refills: 2 | Status: SHIPPED | OUTPATIENT
Start: 2024-08-19

## 2024-08-22 ENCOUNTER — TELEPHONE (OUTPATIENT)
Dept: FAMILY MEDICINE CLINIC | Facility: CLINIC | Age: 64
End: 2024-08-22
Payer: MEDICARE

## 2024-08-26 ENCOUNTER — TELEPHONE (OUTPATIENT)
Dept: FAMILY MEDICINE CLINIC | Facility: CLINIC | Age: 64
End: 2024-08-26
Payer: MEDICARE

## 2024-08-26 NOTE — TELEPHONE ENCOUNTER
SHANDRA  WITH [facility] CALLED REQUESTING AN URINALYSIS FOLLOW UP FROM [facility]    CALL BACK NUMBER [phone]

## 2024-08-28 ENCOUNTER — OFFICE VISIT (OUTPATIENT)
Dept: ORTHOPEDIC SURGERY | Facility: CLINIC | Age: 64
End: 2024-08-28
Payer: MEDICARE

## 2024-08-28 VITALS — RESPIRATION RATE: 20 BRPM | WEIGHT: 153 LBS | OXYGEN SATURATION: 97 % | BODY MASS INDEX: 24.01 KG/M2 | HEIGHT: 67 IN

## 2024-08-28 DIAGNOSIS — S82.025D CLOSED NONDISPLACED LONGITUDINAL FRACTURE OF LEFT PATELLA WITH ROUTINE HEALING, SUBSEQUENT ENCOUNTER: ICD-10-CM

## 2024-08-28 DIAGNOSIS — S82.025A CLOSED NONDISPLACED LONGITUDINAL FRACTURE OF LEFT PATELLA, INITIAL ENCOUNTER: Primary | ICD-10-CM

## 2024-08-28 NOTE — PROGRESS NOTES
FOLLOW UP VISIT        Patient Name: Manuel Gaspar  : 1960  Primary Care Physician: Dang Holder APRN        Chief Complaint:  left patella fracture     HPI:   Manuel Gaspar is a 64 y.o. year old who presents today for follow up of the above. He completed PT ~ 2 weeks ago; tolerated well. He denies any pain in the left knee, denies swelling, denies instability.       Past Medical/Surgical, Social and Family History:  I have reviewed and/or updated pertinent history as noted in the medical record including:  Past Medical History:   Diagnosis Date    Hearing loss     Hypertension     Kidney stone     Mental retardation     Neurogenic bladder     Seizure disorder     onset 16-17 years old, last seizure about  when weaning phenobarb     Past Surgical History:   Procedure Laterality Date    BLADDER REPAIR      CATARACT EXTRACTION      ORIF PATELLA FRACTURE Left     THORACOTOMY      TYMPANOMASTOIDECTOMY       Social History     Occupational History    Not on file   Tobacco Use    Smoking status: Never     Passive exposure: Never    Smokeless tobacco: Never   Vaping Use    Vaping status: Never Used   Substance and Sexual Activity    Alcohol use: No    Drug use: No    Sexual activity: Never      Social History     Social History Narrative    Not on file     Family History   Family history unknown: Yes       Allergies:   Allergies   Allergen Reactions    Levofloxacin Other (See Comments)    Sulfamethoxazole-Trimethoprim Other (See Comments)       Medications:   Home Medications:  Current Outpatient Medications on File Prior to Visit   Medication Sig    acetaminophen (TYLENOL) 325 MG tablet TAKE 2 TABLETS BY MOUTH EVERY 4 HOURS AS NEEDED FOR FEVER AND PAIN ( MAX 12 DOSES IN 24 HOURS)    alendronate (FOSAMAX) 70 MG tablet TAKE 1 TABLET BY MOUTH WEEKLY .TAKE WITH 8OZ WATER BEFORE ANY FOOD OR DRINK. DO NOT LIE DOWN FOR 30 MINUTES    Allergy 4 MG tablet TAKE 1 TABLET BY MOUTH 4 TO 6 HOURS AS NEEDED FOR ALLERGIES AND RUNNY  NOSE    Calcium + Vitamin D3 600-10 MG-MCG tablet per tablet TAKE 1 TABLET BY MOUTH TWICE DAILY    cephalexin (Keflex) 500 MG capsule Take 1 capsule by mouth 2 (Two) Times a Day.    chlorhexidine (PERIDEX) 0.12 % solution SWISH AND SPIT WITH A 1/2 OUNCE TWICE DAILY    Colloidal Oatmeal (Aveeno Eczema Therapy) 1 % cream Apply to dry skin 3 times a day    Combigan 0.2-0.5 % ophthalmic solution     COVID-19 At Home Antigen Test ([employer] At-Home Covid-19 Test) kit USE AS NEEDED FOR COVID 19 SYMPTOMS    dexAMETHasone (DECADRON) 0.1 % ophthalmic solution     DOCUSIL 100 MG capsule TAKE ONE (1) CAPSULE BY MOUTH TWICE A DAY    famotidine (PEPCID) 20 MG tablet TAKE 1 TABLET BY MOUTH TWICE DAILY    fenofibrate (TRICOR) 48 MG tablet TAKE 1 TABLET BY MOUTH AT BEDTIME    FeroSul 325 (65 Fe) MG tablet TAKE 1 TABLET BY MOUTH TWICE DAILY    GNP Antacid Regular Strength 200-200-20 MG/5ML suspension     GNP Milk of Magnesia 1200 MG/15ML suspension     hydrocortisone 2.5 % cream     lisinopril (PRINIVIL,ZESTRIL) 5 MG tablet TAKE 1 TABLET BY MOUTH EVERY DAY IF BLOOD PRESSURE OVER 150/80 CAN GIVE ANOTHER 5MG TABLET (RUN 28 FOR DISPILL, WILL ORDER 2ND DOSE PRN)    loperamide (IMODIUM) 2 MG capsule     loratadine (CLARITIN) 10 MG tablet TAKE 1 TABLET BY MOUTH EVERY DAY    mupirocin (BACTROBAN) 2 % ointment APPLY TO RIGHT 5TH TOE TWICE DAILY    naproxen sodium (Aleve) 220 MG tablet One tab 2x a day for 5 days only with food    Neomycin-Bacitracin-Polymyxin (GNP TRIPLE ANTIBIOTIC) 3.5-400-5000 ointment FOLLOW DIRECTIONS ON LABEL AS NEEDED TO PREVENT INFECTIONS IN MINOR CUTS AND SCRAPES. IF NO IMPROVEMENT IN 48 HOURS NOTIFY NURSE    nitrofurantoin (Macrodantin) 100 MG capsule Take 1 capsule by mouth 2 (Two) Times a Day.    omeprazole (priLOSEC) 20 MG capsule TAKE 1 CAPSULE BY MOUTH EVERY DAY AS NEEDED ( BUBBLE )    OXcarbazepine (TRILEPTAL) 600 MG tablet TAKE 1 TABLET BY MOUTH EVERY MORNING AND 2 TABLETS EVERY EVENING    PHENobarbital 97.2  MG tablet TAKE 1 TABLET BY MOUTH EVERY NIGHT AT BEDTIME (NARC SHEET)    polyethylene glycol (MiraLax) 17 GM/SCOOP powder Take 17 g by mouth Daily. Mix with water 17g daily with 8oz water prn    Psyllium (Metamucil [facility] Fiber) 55.46 % powder Take 1 teaspoon(s) by mouth 2 (Two) Times a Day. 1 tsp 1-2 x day as needed    risperiDONE (risperDAL) 2 MG tablet TAKE 1 TABLET BY MOUTH TWICE DAILY    Robafen 100 MG/5ML liquid     tamsulosin (FLOMAX) 0.4 MG capsule 24 hr capsule Take 1 capsule by mouth Daily.    tolnaftate (TINACTIN) 1 % external solution Apply twice a day to affected area until resolved    white petrolatum gel gel Apply 1 application topically to the appropriate area as directed As Needed (Dry skin). Apply to hands at night and cover with cotton gloves overnight     No current facility-administered medications on file prior to visit.         ROS:  14 point review of systems was negative except as listed in the HPI     Physical Exam:   64 y.o. male  Body mass index is 23.96 kg/m²., 69.4 kg (153 lb)  Vitals:    08/28/24 0902   Resp: 20   SpO2: 97%         General: Alert, cooperative, appears well and in no observable distress.   HEENT: Normocephalic, atraumatic on external visual inspection. No icterus.   CV: No significant peripheral edema.   Respiratory: Normal respiratory effort.   Skin: Warm & well perfused; appropriate skin turgor.  Psych: Appropriate mood & affect.  Neuro: Gross sensation and motor intact in affected extremity/extremities.  Vascular: Peripheral pulses palpable in affected extremity/extremities. Calves/compartments soft and non tender, no evidence of DVT or compartment syndrome.    Left Knee Exam     Range of Motion   The patient has normal left knee ROM.              Knee Musculoskeletal Exam  Gait    Gait is normal.    Inspection    Left      Left knee inspection is normal.     Palpation    Left      Left knee palpation is unremarkable.      Range of Motion    Left      Left knee  range of motion is normal and full.        Investigations:  X-Ray Report:  Left knee(s) X-Ray  Indication: Evaluation of patella fracture  AP, Lateral views  Findings: interval healing of lateral patella fracture   Bony lesion: no  Soft tissues: within normal limits  Joint spaces: within normal limits  Hardware appropriately positioned: not applicable  Prior studies available for comparison: yes                   Assessment:  Left patella fracture   Body mass index is 23.96 kg/m².  BMI consistent with Normal: 18.5-24.9kg/m2  BMI is within normal parameters. No other follow-up for BMI required.          Plan:  Doing excellent today  Xray imaging reassuring and has completed PT as directed   Follow up PRN  Patient encouraged to call with questions or concerns in the interim      HORTENCIA Vincent

## 2024-09-06 DIAGNOSIS — F20.0 PARANOID SCHIZOPHRENIA: ICD-10-CM

## 2024-09-07 RX ORDER — CALCIUM CARBONATE/VITAMIN D3 600 MG-10
TABLET ORAL
Qty: 60 TABLET | Refills: 0 | Status: SHIPPED | OUTPATIENT
Start: 2024-09-07

## 2024-09-07 RX ORDER — ALENDRONATE SODIUM 70 MG/1
TABLET ORAL
Qty: 4 TABLET | Refills: 0 | Status: SHIPPED | OUTPATIENT
Start: 2024-09-07

## 2024-09-07 RX ORDER — LISINOPRIL 5 MG/1
TABLET ORAL
Qty: 60 TABLET | Refills: 0 | Status: SHIPPED | OUTPATIENT
Start: 2024-09-07

## 2024-09-07 RX ORDER — FENOFIBRATE 48 MG/1
TABLET, COATED ORAL
Qty: 30 TABLET | Refills: 0 | Status: SHIPPED | OUTPATIENT
Start: 2024-09-07

## 2024-09-07 RX ORDER — FERROUS SULFATE 325(65) MG
TABLET ORAL
Qty: 60 TABLET | Refills: 0 | Status: SHIPPED | OUTPATIENT
Start: 2024-09-07

## 2024-09-07 RX ORDER — LORATADINE 10 MG/1
TABLET ORAL
Qty: 30 TABLET | Refills: 0 | Status: SHIPPED | OUTPATIENT
Start: 2024-09-07

## 2024-09-08 LAB
BACTERIA UR CULT: NORMAL
BACTERIA UR CULT: NORMAL

## 2024-09-09 RX ORDER — RISPERIDONE 2 MG/1
TABLET ORAL
Qty: 56 TABLET | Refills: 5 | Status: SHIPPED | OUTPATIENT
Start: 2024-09-09

## 2024-09-12 ENCOUNTER — TELEMEDICINE (OUTPATIENT)
Dept: PSYCHIATRY | Facility: CLINIC | Age: 64
End: 2024-09-12
Payer: MEDICARE

## 2024-09-12 DIAGNOSIS — F79 INTELLECTUAL DISABILITY: Primary | Chronic | ICD-10-CM

## 2024-09-12 DIAGNOSIS — F20.0 PARANOID SCHIZOPHRENIA: Chronic | ICD-10-CM

## 2024-09-12 DIAGNOSIS — F41.1 GENERALIZED ANXIETY DISORDER: Chronic | ICD-10-CM

## 2024-09-12 NOTE — PROGRESS NOTES
Subjective   Manuel Gaspar is a 64 y.o.white male with MR who [facility]    This provider is located in [place], [place] using a secure MyChart Video Visit through [employer]. Patient is being seen remotely via telehealth at their home address in [place], and stated they are in a secure environment for this session. The patient's condition being diagnosed/treated is appropriate for telemedicine. The provider identified herself as well as her credentials.   The patient, and/or patients guardian, consent to be seen remotely, and when consent is given they understand that the consent allows for patient identifiable information to be sent to a third party as needed.   They may refuse to be seen remotely at any time. The electronic data is encrypted and password protected, and the patient and/or guardian has been advised of the potential risks to privacy not withstanding such measures.   PT Identifiers used: Name and .    You have chosen to receive care through a telephone visit. Do you consent to use a telephone visit for your medical care today? Yes      Chief Complaint:  Schizophrenia, Anxiety    History of Present Illness:   He is still working at the work shop 4 days a week, he has new job at the work shop.  His  is with him, he remains stable on Risperdal 2 mg with no behavioral issues.      Dang vaca, PCP, did labs in 2023, including CMP and lipids  He is still on Trileptal and Phenobarb for seizures.  Enjoys playing [employer]   No AVH, no paranoid symptoms  Denies anxiety or depression  No agitation or aggression since he went back up on Risperdal, some paranoia at lower dosage  No SI/HI  Medications are fine, no need for changes.   Sleeps well, grumpy at times    The following portions of the patient's history were reviewed and updated as appropriate: allergies, current medications, past family history, past medical history, past social  history, past surgical history and problem list.    PAST PSYCHIATRIC HISTORY  Axis I  Schizophrenia/cognitive disorder  Axis II  Learning Disorder    PAST OUTPATIENT TREATMENT  Diagnosis treated:  Cognitive Disorder  Treatment Type:  Medication Management  Prior Psychiatric Medications:  Risperdal  Trileptal  Support Groups:  None  Sequelae Of Mental Disorder:  emotional distress      Interval History  No Change    Side Effects  None    Past Psych History was reviewed and compared to 3/13/24 visit and no updates were needed.      Past Medical History:  Past Medical History:   Diagnosis Date    Hearing loss     Hypertension     Kidney stone     Mental retardation     Neurogenic bladder     Seizure disorder     onset 16-17 years old, last seizure about 2015 when weaning phenobarb       Social History:  Social History     Socioeconomic History    Marital status: Single   Tobacco Use    Smoking status: Never     Passive exposure: Never    Smokeless tobacco: Never   Vaping Use    Vaping status: Never Used   Substance and Sexual Activity    Alcohol use: No    Drug use: No    Sexual activity: Never       Family History:  Family History   Family history unknown: Yes       Past Surgical History:  Past Surgical History:   Procedure Laterality Date    BLADDER REPAIR      CATARACT EXTRACTION      ORIF PATELLA FRACTURE Left     THORACOTOMY      TYMPANOMASTOIDECTOMY         Problem List:  Patient Active Problem List   Diagnosis    Allergy status to unspecified drugs, medicaments and biological substances status    Anemia    Constipation    Deafness    Difficult or painful urination    Dysthymia    Encounter for lipid screening for cardiovascular disease    Encounter for screening    Encounter for screening for malignant neoplasm of prostate    Encounter for immunization    Therapeutic drug monitoring    Encounter for immunization    Encounter for general adult medical examination without abnormal findings    Enlarged prostate  without lower urinary tract symptoms (luts)    Generalized anxiety disorder    High cholesterol    Hypertension, benign    Hyponatremia    Knee pain    Intellectual disability    Nephrolithiasis    Otalgia    Otitis media    Partial epilepsy with impairment of consciousness, intractable    Peptic ulcer disease    Schizophrenia    Seizure disorder    Serum creatinine raised    Weight loss, non-intentional    Positive hepatitis C antibody test    BMI 23.0-23.9, adult    Other osteoporosis without current pathological fracture    Urethral stricture due to infection    Urinary frequency    Closed sleeve fracture of left patella with routine healing       Allergy:   Allergies   Allergen Reactions    Levofloxacin Other (See Comments)    Sulfamethoxazole-Trimethoprim Other (See Comments)        Discontinued Medications:  There are no discontinued medications.    Current Medications:   Current Outpatient Medications   Medication Sig Dispense Refill    acetaminophen (TYLENOL) 325 MG tablet TAKE 2 TABLETS BY MOUTH EVERY 4 HOURS AS NEEDED FOR FEVER AND PAIN ( MAX 12 DOSES IN 24 HOURS) 60 tablet 10    alendronate (FOSAMAX) 70 MG tablet TAKE 1 TABLET BY MOUTH WEEKLY .TAKE WITH 8OZ WATER BEFORE ANY FOOD OR DRINK. DO NOT LIE DOWN FOR 30 MINUTES 4 tablet 0    Allergy 4 MG tablet TAKE 1 TABLET BY MOUTH 4 TO 6 HOURS AS NEEDED FOR ALLERGIES AND RUNNY NOSE 24 tablet 0    Calcium + Vitamin D3 600-10 MG-MCG tablet per tablet TAKE 1 TABLET BY MOUTH TWICE DAILY 60 tablet 0    cephalexin (Keflex) 500 MG capsule Take 1 capsule by mouth 2 (Two) Times a Day. 20 capsule 0    chlorhexidine (PERIDEX) 0.12 % solution SWISH AND SPIT WITH A 1/2 OUNCE TWICE DAILY 946 mL 0    Colloidal Oatmeal (Aveeno Eczema Therapy) 1 % cream Apply to dry skin 3 times a day 354 each 5    Combigan 0.2-0.5 % ophthalmic solution       COVID-19 At Home Antigen Test (QuickVue At-Home Covid-19 Test) kit USE AS NEEDED FOR COVID 19 SYMPTOMS 2 kit 2    dexAMETHasone  (DECADRON) 0.1 % ophthalmic solution       DOCUSIL 100 MG capsule TAKE ONE (1) CAPSULE BY MOUTH TWICE A DAY 60 capsule 3    famotidine (PEPCID) 20 MG tablet TAKE 1 TABLET BY MOUTH TWICE DAILY 60 tablet 2    fenofibrate (TRICOR) 48 MG tablet TAKE 1 TABLET BY MOUTH AT BEDTIME 30 tablet 0    FeroSul 325 (65 Fe) MG tablet TAKE 1 TABLET BY MOUTH TWICE DAILY 60 tablet 0    GNP Antacid Regular Strength 200-200-20 MG/5ML suspension       GNP Milk of Magnesia 1200 MG/15ML suspension       hydrocortisone 2.5 % cream       lisinopril (PRINIVIL,ZESTRIL) 5 MG tablet TAKE 1 TABLET BY MOUTH EVERY DAY IF BLOOD PRESSURE OVER 150/80 CAN GIVE ANOTHER 5MG TABLET AS NEEDED (RUN 28 FOR DISPILL, WILL ORDER 2ND DOSE PRN) 60 tablet 0    loperamide (IMODIUM) 2 MG capsule       loratadine (CLARITIN) 10 MG tablet TAKE 1 TABLET BY MOUTH EVERY DAY 30 tablet 0    mupirocin (BACTROBAN) 2 % ointment APPLY TO RIGHT 5TH TOE TWICE DAILY      naproxen sodium (Aleve) 220 MG tablet One tab 2x a day for 5 days only with food 30 tablet 0    Neomycin-Bacitracin-Polymyxin (GNP TRIPLE ANTIBIOTIC) 3.5-400-5000 ointment FOLLOW DIRECTIONS ON LABEL AS NEEDED TO PREVENT INFECTIONS IN MINOR CUTS AND SCRAPES. IF NO IMPROVEMENT IN 48 HOURS NOTIFY NURSE 28.4 g 12    nitrofurantoin (Macrodantin) 100 MG capsule Take 1 capsule by mouth 2 (Two) Times a Day. 14 capsule 0    omeprazole (priLOSEC) 20 MG capsule TAKE 1 CAPSULE BY MOUTH EVERY DAY AS NEEDED ( BUBBLE ) 30 capsule 0    OXcarbazepine (TRILEPTAL) 600 MG tablet TAKE 1 TABLET BY MOUTH EVERY MORNING AND 2 TABLETS EVERY EVENING 90 tablet 10    PHENobarbital 97.2 MG tablet TAKE 1 TABLET BY MOUTH EVERY NIGHT AT BEDTIME (NARC SHEET) 90 tablet 3    polyethylene glycol (MiraLax) 17 GM/SCOOP powder Take 17 g by mouth Daily. Mix with water 17g daily with 8oz water prn 1 each 2    Psyllium (Metamucil [employer] Fiber) 55.46 % powder Take 1 teaspoon(s) by mouth 2 (Two) Times a Day. 1 tsp 1-2 x day as needed 1 g 1     risperiDONE (risperDAL) 2 MG tablet TAKE 1 TABLET BY MOUTH TWICE DAILY 56 tablet 5    Robafen 100 MG/5ML liquid       tamsulosin (FLOMAX) 0.4 MG capsule 24 hr capsule Take 1 capsule by mouth Daily.      tolnaftate (TINACTIN) 1 % external solution Apply twice a day to affected area until resolved 29.5 mL 2    white petrolatum gel gel Apply 1 application topically to the appropriate area as directed As Needed (Dry skin). Apply to hands at night and cover with cotton gloves overnight 212 g 2     No current facility-administered medications for this visit.         Review of Symptoms:    Psychiatric/Behavioral: Negative for agitation, behavioral problems, confusion, decreased concentration, dysphoric mood, hallucinations, self-injury, sleep disturbance and suicidal ideas. The patient is not nervous/anxious and is not hyperactive.        Physical Exam:   There were no vitals taken for this visit.    Mental Status Exam:   Hygiene:   Good  Cooperation:  Cooperative  Eye Contact:  Good  Psychomotor Behavior:  Slow  Affect:  Appropriate  Mood: normal, smiling  Hopelessness: Denies  Speech:  Normal  Thought Process:  Goal directed  Thought Content:  Normal  Suicidal:  None  Homicidal:  None  Hallucinations:  None  Delusion:  None  Memory:  Deficits  Orientation:  Person, Place, Time and Situation  Reliability:  fair  Insight:  Fair  Judgement:  Fair  Impulse Control:  Good  Physical/Medical Issues:  No      Mental Status Exam was reviewed and compared to 3/13/24 visit and no updates were needed, the exam was the same.        PHQ-9 Depression Screening  Little interest or pleasure in doing things? 0-->not at all   Feeling down, depressed, or hopeless? 0-->not at all   Trouble falling or staying asleep, or sleeping too much?     Feeling tired or having little energy?     Poor appetite or overeating?     Feeling bad about yourself - or that you are a failure or have let yourself or your family down?     Trouble concentrating on  things, such as reading the newspaper or watching television?     Moving or speaking so slowly that other people could have noticed? Or the opposite - being so fidgety or restless that you have been moving around a lot more than usual?     Thoughts that you would be better off dead, or of hurting yourself in some way?     PHQ-9 Total Score 0   If you checked off any problems, how difficult have these problems made it for you to do your work, take care of things at home, or get along with other people?             Never smoker    I advised Ray of the risks of tobacco use.     Lab Results:   Orders Only on 09/05/2024   Component Date Value Ref Range Status    Urine Culture 09/05/2024 Final report   Final    Result 1 09/05/2024 Comment   Final    Comment: Mixed urogenital klever  25,000-50,000 colony forming units per mL     Orders Only on 08/06/2024   Component Date Value Ref Range Status    Urine Culture 08/06/2024 Final report   Final    Result 1 08/06/2024 Comment   Final    Comment: Mixed urogenital klever  Less than 10,000 colonies/mL     Office Visit on 08/06/2024   Component Date Value Ref Range Status    Glucose 08/06/2024 85  70 - 99 mg/dL Final    BUN 08/06/2024 31 (H)  8 - 27 mg/dL Final    Creatinine 08/06/2024 1.33 (H)  0.76 - 1.27 mg/dL Final    EGFR Result 08/06/2024 59 (L)  >59 mL/min/1.73 Final    BUN/Creatinine Ratio 08/06/2024 23  10 - 24 Final    Sodium 08/06/2024 136  134 - 144 mmol/L Final    Potassium 08/06/2024 4.3  3.5 - 5.2 mmol/L Final    Chloride 08/06/2024 99  96 - 106 mmol/L Final    Total CO2 08/06/2024 23  20 - 29 mmol/L Final    Calcium 08/06/2024 10.1  8.6 - 10.2 mg/dL Final    Total Protein 08/06/2024 6.8  6.0 - 8.5 g/dL Final    Albumin 08/06/2024 4.2  3.9 - 4.9 g/dL Final    Globulin 08/06/2024 2.6  1.5 - 4.5 g/dL Final    Total Bilirubin 08/06/2024 0.3  0.0 - 1.2 mg/dL Final    Alkaline Phosphatase 08/06/2024 75  44 - 121 IU/L Final    AST (SGOT) 08/06/2024 16  0 - 40 IU/L Final     ALT (SGPT) 08/06/2024 13  0 - 44 IU/L Final    Total Cholesterol 08/06/2024 215 (H)  100 - 199 mg/dL Final    Triglycerides 08/06/2024 117  0 - 149 mg/dL Final    HDL Cholesterol 08/06/2024 67  >39 mg/dL Final    VLDL Cholesterol Damon 08/06/2024 21  5 - 40 mg/dL Final    LDL Chol Calc (NIH) 08/06/2024 127 (H)  0 - 99 mg/dL Final    LDL/HDL RATIO 08/06/2024 1.9  0.0 - 3.6 ratio Final    Comment:                                     LDL/HDL Ratio                                              Men  Women                                1/2 Avg.Risk  1.0    1.5                                    Avg.Risk  3.6    3.2                                 2X Avg.Risk  6.2    5.0                                 3X Avg.Risk  8.0    6.1      TSH 08/06/2024 1.550  0.450 - 4.500 uIU/mL Final    Free T4 08/06/2024 0.83  0.82 - 1.77 ng/dL Final    T3, Total 08/06/2024 90  71 - 180 ng/dL Final    Color 08/06/2024 Yellow  Yellow, Straw, Dark Yellow, Lluvia Final    Clarity, UA 08/06/2024 Clear  Clear Final    Specific Gravity  08/06/2024 1.010  1.005 - 1.030 Final    pH, Urine 08/06/2024 8.0  5.0 - 8.0 Final    Leukocytes 08/06/2024 Large (3+) (A)  Negative Final    Nitrite, UA 08/06/2024 Negative  Negative Final    Protein, POC 08/06/2024 Trace (A)  Negative mg/dL Final    Glucose, UA 08/06/2024 Negative  Negative mg/dL Final    Ketones, UA 08/06/2024 Negative  Negative Final    Urobilinogen, UA 08/06/2024 Normal  Normal, 0.2 E.U./dL Final    Bilirubin 08/06/2024 Negative  Negative Final    Blood, UA 08/06/2024 Moderate (A)  Negative Final    Lot Number 08/06/2024 [id number]   Final    Expiration Date 08/06/2024 3,032,026   Final   Orders Only on 07/16/2024   Component Date Value Ref Range Status    WBC 07/16/2024 5.2  3.4 - 10.8 x10E3/uL Final    RBC 07/16/2024 3.42 (L)  4.14 - 5.80 x10E6/uL Final    Hemoglobin 07/16/2024 11.3 (L)  13.0 - 17.7 g/dL Final    Hematocrit 07/16/2024 33.0 (L)  37.5 - 51.0 % Final    MCV 07/16/2024 97  79 -  97 fL Final    MCH 07/16/2024 33.0  26.6 - 33.0 pg Final    MCHC 07/16/2024 34.2  31.5 - 35.7 g/dL Final    RDW 07/16/2024 11.8  11.6 - 15.4 % Final    Platelets 07/16/2024 219  150 - 450 x10E3/uL Final    Neutrophil Rel % 07/16/2024 57  Not Estab. % Final    Lymphocyte Rel % 07/16/2024 21  Not Estab. % Final    Monocyte Rel % 07/16/2024 15  Not Estab. % Final    Eosinophil Rel % 07/16/2024 6  Not Estab. % Final    Basophil Rel % 07/16/2024 1  Not Estab. % Final    Neutrophils Absolute 07/16/2024 3.0  1.4 - 7.0 x10E3/uL Final    Lymphocytes Absolute 07/16/2024 1.1  0.7 - 3.1 x10E3/uL Final    Monocytes Absolute 07/16/2024 0.8  0.1 - 0.9 x10E3/uL Final    Eosinophils Absolute 07/16/2024 0.3  0.0 - 0.4 x10E3/uL Final    Basophils Absolute 07/16/2024 0.0  0.0 - 0.2 x10E3/uL Final    Immature Granulocyte Rel % 07/16/2024 0  Not Estab. % Final    Immature Grans Absolute 07/16/2024 0.0  0.0 - 0.1 x10E3/uL Final       Assessment & Plan   Problems Addressed this Visit          Mental Health    Generalized anxiety disorder (Chronic)    Schizophrenia (Chronic)       Neuro    Intellectual disability - Primary (Chronic)     Diagnoses         Codes Comments    Intellectual disability    -  Primary ICD-10-CM: F79  ICD-9-CM: 319     Paranoid schizophrenia     ICD-10-CM: F20.0  ICD-9-CM: 295.30     Generalized anxiety disorder     ICD-10-CM: F41.1  ICD-9-CM: 300.02             Visit Diagnoses:    ICD-10-CM ICD-9-CM   1. Intellectual disability  F79 319   2. Paranoid schizophrenia  F20.0 295.30   3. Generalized anxiety disorder  F41.1 300.02             TREATMENT PLAN/GOALS: Continue supportive psychotherapy efforts and medications as indicated. Treatment and medication options discussed during today's visit. Patient ackowledged and verbally consented to continue with current treatment plan and was educated on the importance of compliance with treatment and follow-up appointments.    MEDICATION ISSUES:  INSPECT reviewed as  expected  Discussed medication options and treatment plan of prescribed medication as well as the risks, benefits, and side effects including potential falls, possible impaired driving and metabolic adversities among others. Patient is agreeable to call the office with any worsening of symptoms or onset of side effects. Patient is agreeable to call 911 or go to the nearest ER should he/she begin having SI/HI. No medication side effects or related complaints today.     Patient continues to do well on Risperdal, did not do well with dosage reduction and back up to 2 mg tabs.    Continue Risperdal 2 mg BID   Continue Trileptal prescribed by neurology (Dr, Seipel) for seizures, sees him annually  Bloodwork done by Dang Holder on  8/6/24      MEDS ORDERED DURING VISIT:  No orders of the defined types were placed in this encounter.      Return in about 6 months (around 3/12/2025) for video visit.      This document has been electronically signed by Krista Epstein PA-C  September 12, 2024 08:45 EDT    EMR Dragon transcription disclaimer:  Some of this encounter note is an electronic transcription translation of spoken language to printed text. The electronic translation of spoken language may permit erroneous, or at times, nonsensical words or phrases to be inadvertently transcribed; Although I have reviewed the note for such errors some may still exist.

## 2024-10-05 RX ORDER — CALCIUM CARBONATE/VITAMIN D3 600 MG-10
TABLET ORAL
Qty: 60 TABLET | Refills: 0 | Status: SHIPPED | OUTPATIENT
Start: 2024-10-05

## 2024-10-05 RX ORDER — LORATADINE 10 MG/1
TABLET ORAL
Qty: 30 TABLET | Refills: 0 | Status: SHIPPED | OUTPATIENT
Start: 2024-10-05

## 2024-10-05 RX ORDER — ALENDRONATE SODIUM 70 MG/1
TABLET ORAL
Qty: 4 TABLET | Refills: 0 | Status: SHIPPED | OUTPATIENT
Start: 2024-10-05

## 2024-10-05 RX ORDER — FERROUS SULFATE 325(65) MG
TABLET ORAL
Qty: 60 TABLET | Refills: 0 | Status: SHIPPED | OUTPATIENT
Start: 2024-10-05

## 2024-10-05 RX ORDER — FENOFIBRATE 48 MG/1
TABLET, COATED ORAL
Qty: 30 TABLET | Refills: 0 | Status: SHIPPED | OUTPATIENT
Start: 2024-10-05

## 2024-10-07 ENCOUNTER — TELEPHONE (OUTPATIENT)
Dept: FAMILY MEDICINE CLINIC | Facility: CLINIC | Age: 64
End: 2024-10-07
Payer: MEDICARE

## 2024-10-11 LAB
ALBUMIN SERPL ELPH-MCNC: 3.6 G/DL (ref 2.9–4.4)
ALBUMIN/GLOB SERPL: 1.3 {RATIO} (ref 0.7–1.7)
ALPHA1 GLOB SERPL ELPH-MCNC: 0.2 G/DL (ref 0–0.4)
ALPHA2 GLOB SERPL ELPH-MCNC: 1 G/DL (ref 0.4–1)
B-GLOBULIN SERPL ELPH-MCNC: 1.1 G/DL (ref 0.7–1.3)
BASOPHILS # BLD AUTO: 0.1 X10E3/UL (ref 0–0.2)
BASOPHILS NFR BLD AUTO: 1 %
EOSINOPHIL # BLD AUTO: 0.4 X10E3/UL (ref 0–0.4)
EOSINOPHIL NFR BLD AUTO: 6 %
ERYTHROCYTE [DISTWIDTH] IN BLOOD BY AUTOMATED COUNT: 11.7 % (ref 11.6–15.4)
GAMMA GLOB SERPL ELPH-MCNC: 0.8 G/DL (ref 0.4–1.8)
GLOBULIN SER-MCNC: 3 G/DL (ref 2.2–3.9)
HAPTOGLOB SERPL-MCNC: 221 MG/DL (ref 32–363)
HCT VFR BLD AUTO: 36 % (ref 37.5–51)
HGB BLD-MCNC: 11.8 G/DL (ref 13–17.7)
IGA SERPL-MCNC: 457 MG/DL (ref 61–437)
IGG SERPL-MCNC: 839 MG/DL (ref 603–1613)
IGM SERPL-MCNC: 145 MG/DL (ref 20–172)
IMM GRANULOCYTES # BLD AUTO: 0 X10E3/UL (ref 0–0.1)
IMM GRANULOCYTES NFR BLD AUTO: 1 %
INTERPRETATION SERPL IEP-IMP: ABNORMAL
KAPPA LC FREE SER-MCNC: 71.2 MG/L (ref 3.3–19.4)
KAPPA LC FREE/LAMBDA FREE SER: 2.17 {RATIO} (ref 0.26–1.65)
LABORATORY COMMENT REPORT: ABNORMAL
LAMBDA LC FREE SERPL-MCNC: 32.8 MG/L (ref 5.7–26.3)
LDH SERPL L TO P-CCNC: 187 IU/L (ref 121–224)
LYMPHOCYTES # BLD AUTO: 0.7 X10E3/UL (ref 0.7–3.1)
LYMPHOCYTES NFR BLD AUTO: 9 %
M PROTEIN SERPL ELPH-MCNC: ABNORMAL G/DL
MCH RBC QN AUTO: 32.5 PG (ref 26.6–33)
MCHC RBC AUTO-ENTMCNC: 32.8 G/DL (ref 31.5–35.7)
MCV RBC AUTO: 99 FL (ref 79–97)
MONOCYTES # BLD AUTO: 0.7 X10E3/UL (ref 0.1–0.9)
MONOCYTES NFR BLD AUTO: 10 %
NEUTROPHILS # BLD AUTO: 5.2 X10E3/UL (ref 1.4–7)
NEUTROPHILS NFR BLD AUTO: 73 %
PLATELET # BLD AUTO: 229 X10E3/UL (ref 150–450)
PROT SERPL-MCNC: 6.6 G/DL (ref 6–8.5)
RBC # BLD AUTO: 3.63 X10E6/UL (ref 4.14–5.8)
RETICS/RBC NFR AUTO: 1.1 % (ref 0.6–2.6)
WBC # BLD AUTO: 7.1 X10E3/UL (ref 3.4–10.8)

## 2024-10-15 ENCOUNTER — OFFICE VISIT (OUTPATIENT)
Dept: ONCOLOGY | Facility: CLINIC | Age: 64
End: 2024-10-15
Payer: MEDICARE

## 2024-10-15 VITALS
HEIGHT: 67 IN | OXYGEN SATURATION: 100 % | BODY MASS INDEX: 24.48 KG/M2 | HEART RATE: 73 BPM | WEIGHT: 156 LBS | TEMPERATURE: 97.7 F

## 2024-10-15 DIAGNOSIS — D64.9 ANEMIA, UNSPECIFIED TYPE: Primary | ICD-10-CM

## 2024-10-15 RX ORDER — CHLORHEXIDINE GLUCONATE ORAL RINSE 1.2 MG/ML
SOLUTION DENTAL
Qty: 946 ML | Refills: 0 | Status: SHIPPED | OUTPATIENT
Start: 2024-10-15
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
## 2024-11-01 RX ORDER — ALENDRONATE SODIUM 70 MG/1
TABLET ORAL
Qty: 4 TABLET | Refills: 0 | Status: SHIPPED | OUTPATIENT
Start: 2024-11-01

## 2024-11-01 RX ORDER — CALCIUM CARBONATE/VITAMIN D3 600 MG-10
TABLET ORAL
Qty: 60 TABLET | Refills: 0 | Status: SHIPPED | OUTPATIENT
Start: 2024-11-01

## 2024-11-01 RX ORDER — FENOFIBRATE 48 MG/1
TABLET, COATED ORAL
Qty: 30 TABLET | Refills: 0 | Status: SHIPPED | OUTPATIENT
Start: 2024-11-01

## 2024-11-01 RX ORDER — FERROUS SULFATE 325(65) MG
TABLET ORAL
Qty: 60 TABLET | Refills: 0 | Status: SHIPPED | OUTPATIENT
Start: 2024-11-01

## 2024-11-01 RX ORDER — LORATADINE 10 MG/1
TABLET ORAL
Qty: 30 TABLET | Refills: 0 | Status: SHIPPED | OUTPATIENT
Start: 2024-11-01

## 2024-11-01 RX ORDER — LISINOPRIL 5 MG/1
TABLET ORAL
Qty: 60 TABLET | Refills: 0 | Status: SHIPPED | OUTPATIENT
Start: 2024-11-01

## 2024-11-01 RX ORDER — FAMOTIDINE 20 MG/1
TABLET, FILM COATED ORAL
Qty: 60 TABLET | Refills: 1 | Status: SHIPPED | OUTPATIENT
Start: 2024-11-01

## 2024-11-05 ENCOUNTER — OFFICE VISIT (OUTPATIENT)
Dept: FAMILY MEDICINE CLINIC | Facility: CLINIC | Age: 64
End: 2024-11-05
Payer: MEDICARE

## 2024-11-05 VITALS
HEIGHT: 67 IN | DIASTOLIC BLOOD PRESSURE: 60 MMHG | TEMPERATURE: 97.1 F | BODY MASS INDEX: 24.64 KG/M2 | OXYGEN SATURATION: 97 % | HEART RATE: 76 BPM | WEIGHT: 157 LBS | SYSTOLIC BLOOD PRESSURE: 98 MMHG

## 2024-11-05 DIAGNOSIS — I95.0 IDIOPATHIC HYPOTENSION: Primary | ICD-10-CM

## 2024-11-05 DIAGNOSIS — M81.8 OTHER OSTEOPOROSIS WITHOUT CURRENT PATHOLOGICAL FRACTURE: ICD-10-CM

## 2024-11-05 DIAGNOSIS — S82.092D CLOSED SLEEVE FRACTURE OF LEFT PATELLA WITH ROUTINE HEALING: ICD-10-CM

## 2024-11-05 PROCEDURE — 3078F DIAST BP <80 MM HG: CPT | Performed by: NURSE PRACTITIONER

## 2024-11-05 PROCEDURE — 1126F AMNT PAIN NOTED NONE PRSNT: CPT | Performed by: NURSE PRACTITIONER

## 2024-11-05 PROCEDURE — 3074F SYST BP LT 130 MM HG: CPT | Performed by: NURSE PRACTITIONER

## 2024-11-05 PROCEDURE — 99213 OFFICE O/P EST LOW 20 MIN: CPT | Performed by: NURSE PRACTITIONER

## 2024-11-05 NOTE — PROGRESS NOTES
"    Manuel aGspar is a 64 y.o. male.     History of Present Illness  64-year-old white male who lives in a group home with history of MR, anxiety, schizophrenia, osteoporosis, hypertension, kidney stones, chronic anemia, neurogenic bladder, hearing loss, chronic hyponatremia, chronic UTIs with negative urine cultures who goes to urology, seizure disorder and recent left knee fracture that is now healed who comes in today for 3-month follow-up visit    Blood pressure 98/60 heart rate 76 with systolic murmur he denies any chest pain, dyspnea, tachycardia or dizziness.  Blood pressure is always under 100 systolic here manually however at the facility it is always in the 130s.  I am going to write a order for them to do manual blood pressures to see if it actually is running that high at the facility.  He has a blood pressure medication ordered as needed    Patient had recent DEXA scan which showed worsening osteoporosis so hopefully they can prevent any further falls    There are no new problems.  Weight is 157 with a BMI of 24.6.  He is up-to-date on all immunizations 5 COVID vaccines flu shot.  He is up-to-date on eye exam he goes to urology for his PSAs and he is up-to-date on colonoscopies            Monitor blood pressures manually at the facility  Try to avoid any falls DEXA scan due again in 2 years  Follow-up 3 months       The following portions of the patient's history were reviewed and updated as appropriate: allergies, current medications, past family history, past medical history, past social history, past surgical history, and problem list.    Vitals:    11/05/24 0813   BP: 98/60   BP Location: Right arm   Patient Position: Sitting   Cuff Size: Adult   Pulse: 76   Temp: 97.1 °F (36.2 °C)   TempSrc: Infrared   SpO2: 97%   Weight: 71.2 kg (157 lb)   Height: 170.2 cm (67.01\")     Body mass index is 24.58 kg/m².  BMI is within normal parameters. No other follow-up for BMI required.       Past Medical History: "   Diagnosis Date    Hearing loss     Hypertension     Kidney stone     Mental retardation     Neurogenic bladder     Seizure disorder     onset 16-17 years old, last seizure about 2015 when weaning phenobarb     Past Surgical History:   Procedure Laterality Date    BLADDER REPAIR      CATARACT EXTRACTION      ORIF PATELLA FRACTURE Left     THORACOTOMY      TYMPANOMASTOIDECTOMY       Family History   Family history unknown: Yes     Immunization History   Administered Date(s) Administered    COVID-19 (MODERNA) 1st,2nd,3rd Dose Monovalent 01/11/2021, 02/08/2021, 10/17/2021    COVID-19 (MODERNA) Monovalent Original Booster 11/24/2021, 08/18/2022    Flu Vaccine Intradermal Quad 18-64YR 10/05/2018, 11/19/2021    Flu Vaccine Quad PF 6-35MO 11/23/2016    Flu Vaccine Quad PF >36MO 11/23/2016, 11/12/2019    Fluzone  >6mos 10/26/2017    Fluzone (or Fluarix & Flulaval for VFC) >6mos 10/21/2020, 11/19/2021, 10/21/2022    Fluzone Quad >6mos (Multi-dose) 10/06/2015    H1N1 Inj 12/03/2009    Hepatitis A 05/23/2018, 11/16/2018    Influenza Inj MDCK Preserative Free 10/05/2018    Influenza Injectable Mdck Pf Quad 11/20/2019    Influenza Seasonal Injectable 10/06/2015, 03/14/2017    Influenza Whole 12/07/1999    Influenza, Unspecified 10/15/2024    PPD Test 11/06/2002, 11/06/2007, 11/10/2009, 11/12/2010, 11/15/2011, 11/27/2012, 12/03/2013, 11/05/2014, 11/03/2015, 01/04/2016, 07/12/2017, 06/13/2018, 06/05/2019, 10/20/2020, 10/19/2021, 11/01/2022, 01/23/2024    Td (TDVAX) 06/13/2014    Tdap 05/22/2009       Orders Only on 10/10/2024   Component Date Value Ref Range Status    IgG 10/10/2024 839  603 - 1,613 mg/dL Final    IgA 10/10/2024 457 (H)  61 - 437 mg/dL Final    IgM 10/10/2024 145  20 - 172 mg/dL Final    Total Protein 10/10/2024 6.6  6.0 - 8.5 g/dL Final    Albumin 10/10/2024 3.6  2.9 - 4.4 g/dL Final    Alpha-1-Globulin 10/10/2024 0.2  0.0 - 0.4 g/dL Final    Alpha-2-Globulin 10/10/2024 1.0  0.4 - 1.0 g/dL Final    Beta Globulin  10/10/2024 1.1  0.7 - 1.3 g/dL Final    Gamma Globulin 10/10/2024 0.8  0.4 - 1.8 g/dL Final    M-Evaristo 10/10/2024 Not Observed  Not Observed g/dL Final    Globulin 10/10/2024 3.0  2.2 - 3.9 g/dL Final    A/G Ratio 10/10/2024 1.3  0.7 - 1.7 Final    Immunofixation Reflex, Serum 10/10/2024 Comment   Final    No monoclonality detected.    Please note 10/10/2024 Comment   Final    Comment: Protein electrophoresis scan will follow via computer, mail, or   delivery.      Free Light Chain, [place] 10/10/2024 71.2 (H)  3.3 - 19.4 mg/L Final    Free Lambda Light Chains 10/10/2024 32.8 (H)  5.7 - 26.3 mg/L Final    Kappa/Lambda Ratio 10/10/2024 2.17 (H)  0.26 - 1.65 Final    WBC 10/10/2024 7.1  3.4 - 10.8 x10E3/uL Final    RBC 10/10/2024 3.63 (L)  4.14 - 5.80 x10E6/uL Final    Hemoglobin 10/10/2024 11.8 (L)  13.0 - 17.7 g/dL Final    Hematocrit 10/10/2024 36.0 (L)  37.5 - 51.0 % Final    MCV 10/10/2024 99 (H)  79 - 97 fL Final    MCH 10/10/2024 32.5  26.6 - 33.0 pg Final    MCHC 10/10/2024 32.8  31.5 - 35.7 g/dL Final    RDW 10/10/2024 11.7  11.6 - 15.4 % Final    Platelets 10/10/2024 229  150 - 450 x10E3/uL Final    Neutrophil Rel % 10/10/2024 73  Not Estab. % Final    Lymphocyte Rel % 10/10/2024 9  Not Estab. % Final    Monocyte Rel % 10/10/2024 10  Not Estab. % Final    Eosinophil Rel % 10/10/2024 6  Not Estab. % Final    Basophil Rel % 10/10/2024 1  Not Estab. % Final    Neutrophils Absolute 10/10/2024 5.2  1.4 - 7.0 x10E3/uL Final    Lymphocytes Absolute 10/10/2024 0.7  0.7 - 3.1 x10E3/uL Final    Monocytes Absolute 10/10/2024 0.7  0.1 - 0.9 x10E3/uL Final    Eosinophils Absolute 10/10/2024 0.4  0.0 - 0.4 x10E3/uL Final    Basophils Absolute 10/10/2024 0.1  0.0 - 0.2 x10E3/uL Final    Immature Granulocyte Rel % 10/10/2024 1  Not Estab. % Final    Immature Grans Absolute 10/10/2024 0.0  0.0 - 0.1 x10E3/uL Final    LDH 10/10/2024 187  121 - 224 IU/L Final    Haptoglobin 10/10/2024 221  32 - 363 mg/dL Final     Reticulocyte Absolute 10/10/2024 1.1  0.6 - 2.6 % Final         Review of Systems   Constitutional: Negative.    HENT: Negative.     Respiratory: Negative.     Cardiovascular: Negative.    Gastrointestinal: Negative.    Genitourinary: Negative.    Musculoskeletal: Negative.    Skin: Negative.    Neurological: Negative.    Psychiatric/Behavioral: Negative.         Objective   Physical Exam  Constitutional:       Appearance: Normal appearance.   HENT:      Head: Normocephalic.   Cardiovascular:      Rate and Rhythm: Normal rate and regular rhythm.      Pulses: Normal pulses.      Heart sounds: Normal heart sounds.   Pulmonary:      Effort: Pulmonary effort is normal.      Breath sounds: Normal breath sounds.   Abdominal:      General: Bowel sounds are normal.   Musculoskeletal:         General: Normal range of motion.   Skin:     General: Skin is warm.   Neurological:      General: No focal deficit present.      Mental Status: He is alert and oriented to person, place, and time.   Psychiatric:         Mood and Affect: Mood normal.         Behavior: Behavior normal.         Procedures    Assessment & Plan   Diagnoses and all orders for this visit:    1. Idiopathic hypotension (Primary)    2. BMI 24.0-24.9, adult    3. Other osteoporosis without current pathological fracture    4. Closed sleeve fracture of left patella with routine healing           Current Outpatient Medications:     acetaminophen (TYLENOL) 325 MG tablet, TAKE 2 TABLETS BY MOUTH EVERY 4 HOURS AS NEEDED FOR FEVER AND PAIN ( MAX 12 DOSES IN 24 HOURS), Disp: 60 tablet, Rfl: 10    alendronate (FOSAMAX) 70 MG tablet, TAKE 1 TABLET BY MOUTH WEEKLY .TAKE WITH 8OZ WATER BEFORE ANY FOOD OR DRINK. DO NOT LIE DOWN FOR 30 MINUTES, Disp: 4 tablet, Rfl: 0    Allergy 4 MG tablet, TAKE 1 TABLET BY MOUTH 4 TO 6 HOURS AS NEEDED FOR ALLERGIES AND RUNNY NOSE, Disp: 24 tablet, Rfl: 0    Calcium + Vitamin D3 600-10 MG-MCG tablet per tablet, TAKE 1 TABLET BY MOUTH TWICE  DAILY, Disp: 60 tablet, Rfl: 0    cephalexin (Keflex) 500 MG capsule, Take 1 capsule by mouth 2 (Two) Times a Day., Disp: 20 capsule, Rfl: 0    chlorhexidine (PERIDEX) 0.12 % solution, SWISH AND SPIT WITH A 1/2 OUNCE TWICE DAILY, Disp: 946 mL, Rfl: 0    Colloidal Oatmeal (Aveeno Eczema Therapy) 1 % cream, Apply to dry skin 3 times a day, Disp: 354 each, Rfl: 5    Combigan 0.2-0.5 % ophthalmic solution, , Disp: , Rfl:     COVID-19 At Home Antigen Test ([facility]ue At-Home Covid-19 Test) kit, USE AS NEEDED FOR COVID 19 SYMPTOMS, Disp: 2 kit, Rfl: 2    dexAMETHasone (DECADRON) 0.1 % ophthalmic solution, , Disp: , Rfl:     DOCUSIL 100 MG capsule, TAKE ONE (1) CAPSULE BY MOUTH TWICE A DAY, Disp: 60 capsule, Rfl: 3    famotidine (PEPCID) 20 MG tablet, TAKE 1 TABLET BY MOUTH TWICE DAILY, Disp: 60 tablet, Rfl: 1    fenofibrate (TRICOR) 48 MG tablet, TAKE 1 TABLET BY MOUTH AT BEDTIME, Disp: 30 tablet, Rfl: 0    FeroSul 325 (65 Fe) MG tablet, TAKE 1 TABLET BY MOUTH TWICE DAILY, Disp: 60 tablet, Rfl: 0    GNP Antacid Regular Strength 200-200-20 MG/5ML suspension, , Disp: , Rfl:     GNP Milk of Magnesia 1200 MG/15ML suspension, , Disp: , Rfl:     hydrocortisone 2.5 % cream, , Disp: , Rfl:     lisinopril (PRINIVIL,ZESTRIL) 5 MG tablet, TAKE 1 TABLET BY MOUTH EVERY DAY IF BLOOD PRESSURE OVER 150/80 CAN GIVE ANOTHER 5MG TABLET AS NEEDED (RUN 28 FOR DISPILL, WILL ORDER 2ND DOSE PRN), Disp: 60 tablet, Rfl: 0    loperamide (IMODIUM) 2 MG capsule, , Disp: , Rfl:     loratadine (CLARITIN) 10 MG tablet, TAKE 1 TABLET BY MOUTH EVERY DAY, Disp: 30 tablet, Rfl: 0    mupirocin (BACTROBAN) 2 % ointment, APPLY TO RIGHT 5TH TOE TWICE DAILY, Disp: , Rfl:     naproxen sodium (Aleve) 220 MG tablet, One tab 2x a day for 5 days only with food, Disp: 30 tablet, Rfl: 0    Neomycin-Bacitracin-Polymyxin (GNP TRIPLE ANTIBIOTIC) 3.5-400-5000 ointment, FOLLOW DIRECTIONS ON LABEL AS NEEDED TO PREVENT INFECTIONS IN MINOR CUTS AND SCRAPES. IF NO IMPROVEMENT IN  48 HOURS NOTIFY NURSE, Disp: 28.4 g, Rfl: 12    nitrofurantoin (Macrodantin) 100 MG capsule, Take 1 capsule by mouth 2 (Two) Times a Day., Disp: 14 capsule, Rfl: 0    omeprazole (priLOSEC) 20 MG capsule, TAKE 1 CAPSULE BY MOUTH EVERY DAY AS NEEDED ( BUBBLE ), Disp: 30 capsule, Rfl: 0    OXcarbazepine (TRILEPTAL) 600 MG tablet, TAKE 1 TABLET BY MOUTH EVERY MORNING AND 2 TABLETS EVERY EVENING, Disp: 90 tablet, Rfl: 10    PHENobarbital 97.2 MG tablet, TAKE 1 TABLET BY MOUTH EVERY NIGHT AT BEDTIME (NARC SHEET), Disp: 90 tablet, Rfl: 3    polyethylene glycol (MiraLax) 17 GM/SCOOP powder, Take 17 g by mouth Daily. Mix with water 17g daily with 8oz water prn, Disp: 1 each, Rfl: 2    Psyllium (Metamucil [facility] Fiber) 55.46 % powder, Take 1 teaspoon(s) by mouth 2 (Two) Times a Day. 1 tsp 1-2 x day as needed, Disp: 1 g, Rfl: 1    risperiDONE (risperDAL) 2 MG tablet, TAKE 1 TABLET BY MOUTH TWICE DAILY, Disp: 56 tablet, Rfl: 5    Robafen 100 MG/5ML liquid, , Disp: , Rfl:     tamsulosin (FLOMAX) 0.4 MG capsule 24 hr capsule, Take 1 capsule by mouth Daily., Disp: , Rfl:     tolnaftate (TINACTIN) 1 % external solution, Apply twice a day to affected area until resolved, Disp: 29.5 mL, Rfl: 2    white petrolatum gel gel, Apply 1 application topically to the appropriate area as directed As Needed (Dry skin). Apply to hands at night and cover with cotton gloves overnight, Disp: 212 g, Rfl: 2           Dang Holder, APRN 11/11/2024 13:39 EST  This note has been electronically signed

## 2024-11-05 NOTE — PATIENT INSTRUCTIONS
Monitor blood pressures manually at the facility  Try to avoid any falls DEXA scan due again in 2 years  Follow-up 3 months

## 2024-11-12 RX ORDER — CHLORHEXIDINE GLUCONATE ORAL RINSE 1.2 MG/ML
SOLUTION DENTAL
Qty: 946 ML | Refills: 0 | Status: SHIPPED | OUTPATIENT
Start: 2024-11-12

## 2024-12-02 RX ORDER — LORATADINE 10 MG/1
TABLET ORAL
Qty: 30 TABLET | Refills: 0 | Status: SHIPPED | OUTPATIENT
Start: 2024-12-02

## 2024-12-02 RX ORDER — CALCIUM CARBONATE/VITAMIN D3 600 MG-10
TABLET ORAL
Qty: 60 TABLET | Refills: 0 | Status: SHIPPED | OUTPATIENT
Start: 2024-12-02

## 2024-12-02 RX ORDER — ALENDRONATE SODIUM 70 MG/1
TABLET ORAL
Qty: 4 TABLET | Refills: 0 | Status: SHIPPED | OUTPATIENT
Start: 2024-12-02

## 2024-12-02 RX ORDER — FERROUS SULFATE 325(65) MG
TABLET ORAL
Qty: 60 TABLET | Refills: 0 | Status: SHIPPED | OUTPATIENT
Start: 2024-12-02

## 2024-12-02 RX ORDER — FENOFIBRATE 48 MG/1
TABLET, COATED ORAL
Qty: 30 TABLET | Refills: 0 | Status: SHIPPED | OUTPATIENT
Start: 2024-12-02

## 2024-12-30 RX ORDER — FENOFIBRATE 48 MG/1
TABLET, COATED ORAL
Qty: 30 TABLET | Refills: 0 | Status: SHIPPED | OUTPATIENT
Start: 2024-12-30

## 2024-12-30 RX ORDER — LORATADINE 10 MG/1
TABLET ORAL
Qty: 30 TABLET | Refills: 0 | Status: SHIPPED | OUTPATIENT
Start: 2024-12-30

## 2024-12-30 RX ORDER — FERROUS SULFATE 325(65) MG
TABLET ORAL
Qty: 60 TABLET | Refills: 0 | Status: SHIPPED | OUTPATIENT
Start: 2024-12-30

## 2024-12-30 RX ORDER — ALENDRONATE SODIUM 70 MG/1
TABLET ORAL
Qty: 4 TABLET | Refills: 0 | Status: SHIPPED | OUTPATIENT
Start: 2024-12-30

## 2024-12-30 RX ORDER — FAMOTIDINE 20 MG/1
TABLET, FILM COATED ORAL
Qty: 60 TABLET | Refills: 0 | Status: SHIPPED | OUTPATIENT
Start: 2024-12-30

## 2024-12-30 RX ORDER — CALCIUM CARBONATE/VITAMIN D3 600 MG-10
TABLET ORAL
Qty: 60 TABLET | Refills: 0 | Status: SHIPPED | OUTPATIENT
Start: 2024-12-30

## 2024-12-30 RX ORDER — LISINOPRIL 5 MG/1
TABLET ORAL
Qty: 60 TABLET | Refills: 0 | Status: SHIPPED | OUTPATIENT
Start: 2024-12-30

## 2025-01-04 RX ORDER — CHLORHEXIDINE GLUCONATE ORAL RINSE 1.2 MG/ML
SOLUTION DENTAL
Qty: 946 ML | Refills: 0 | Status: SHIPPED | OUTPATIENT
Start: 2025-01-04

## 2025-01-24 DIAGNOSIS — G40.109 SEIZURE, TEMPORAL LOBE: ICD-10-CM

## 2025-01-24 RX ORDER — PHENOBARBITAL 97.2 MG/1
TABLET ORAL
Qty: 90 TABLET | Refills: 3 | Status: SHIPPED | OUTPATIENT
Start: 2025-01-24

## 2025-01-27 ENCOUNTER — OFFICE VISIT (OUTPATIENT)
Dept: FAMILY MEDICINE CLINIC | Facility: CLINIC | Age: 65
End: 2025-01-27
Payer: MEDICARE

## 2025-01-27 VITALS
WEIGHT: 152 LBS | BODY MASS INDEX: 23.86 KG/M2 | OXYGEN SATURATION: 95 % | HEART RATE: 83 BPM | TEMPERATURE: 96.9 F | HEIGHT: 67 IN

## 2025-01-27 DIAGNOSIS — M25.562 ACUTE PAIN OF LEFT KNEE: Primary | ICD-10-CM

## 2025-01-27 PROCEDURE — 1126F AMNT PAIN NOTED NONE PRSNT: CPT | Performed by: NURSE PRACTITIONER

## 2025-01-27 PROCEDURE — 99213 OFFICE O/P EST LOW 20 MIN: CPT | Performed by: NURSE PRACTITIONER

## 2025-01-27 RX ORDER — METHOCARBAMOL 500 MG/1
TABLET, FILM COATED ORAL
Qty: 30 TABLET | Refills: 1 | Status: SHIPPED | OUTPATIENT
Start: 2025-01-27

## 2025-01-27 RX ORDER — NAPROXEN SODIUM 220 MG/1
TABLET, FILM COATED ORAL
Qty: 30 TABLET | Refills: 1 | Status: SHIPPED | OUTPATIENT
Start: 2025-01-27

## 2025-01-27 RX ORDER — MELOXICAM 15 MG/1
TABLET ORAL
COMMUNITY
Start: 2025-01-25 | End: 2025-01-27

## 2025-01-27 RX ORDER — CALCIUM CARBONATE/VITAMIN D3 600 MG-10
TABLET ORAL
Qty: 60 TABLET | Refills: 0 | Status: SHIPPED | OUTPATIENT
Start: 2025-01-27

## 2025-01-27 RX ORDER — LORATADINE 10 MG/1
TABLET ORAL
Qty: 30 TABLET | Refills: 0 | Status: SHIPPED | OUTPATIENT
Start: 2025-01-27

## 2025-01-27 RX ORDER — ALENDRONATE SODIUM 70 MG/1
TABLET ORAL
Qty: 4 TABLET | Refills: 0 | Status: SHIPPED | OUTPATIENT
Start: 2025-01-27

## 2025-01-27 RX ORDER — FAMOTIDINE 20 MG/1
TABLET, FILM COATED ORAL
Qty: 60 TABLET | Refills: 0 | Status: SHIPPED | OUTPATIENT
Start: 2025-01-27

## 2025-01-27 RX ORDER — FENOFIBRATE 48 MG/1
TABLET, COATED ORAL
Qty: 30 TABLET | Refills: 0 | Status: SHIPPED | OUTPATIENT
Start: 2025-01-27

## 2025-01-27 RX ORDER — FERROUS SULFATE 325(65) MG
TABLET ORAL
Qty: 60 TABLET | Refills: 0 | Status: SHIPPED | OUTPATIENT
Start: 2025-01-27

## 2025-01-27 NOTE — PATIENT INSTRUCTIONS
Left knee x-ray  Stop meloxicam  Naproxen 500 mg twice a day for 2 weeks then as needed  Methocarbamol 500 mg 3 times daily x 1 week then as needed  Wear brace at all times  No walking at work seated work only

## 2025-01-27 NOTE — PROGRESS NOTES
"    Manuel Gaspar is a 64 y.o. male.     History of Present Illness  64-year-old white male who lives in a group home with history of MR, anxiety, schizophrenia, osteoporosis, hypertension, kidney stones, chronic anemia, neurogenic bladder, hearing loss, chronic hyponatremia, chronic UTIs with negative urine cultures to go to urology, seizure disorder and recent left knee fracture comes in today with caregiver after slipping on the ice.    Blood pressure 112/60 heart rate 82 he denies any chest pain, dyspnea, tachycardia or dizziness    Patient slipped on ice and twisted his leg pretty badly but did not fall on his knee.  He did go to urgent care they gave him meloxicam and a knee brace but states the pain is still bothering him quite a bit.  Caregiver states this patient has a really high pain threshold.  I am going to get an x-ray since this is the knee that he fractured and placed him on naproxen and a muscle relaxer.  Told him that if he goes to workshop he has to sit only and to really limit walking.  Will stop the meloxicam    Weight is 152 with a BMI of 23.8.  He is up-to-date on all immunizations.  He is up-to-date on eye exam goes to urology for his PSAs and he is up-to-date on colonoscopies through Dr. Valentine's office          Left knee x-ray  Stop meloxicam  Naproxen 500 mg twice a day for 2 weeks then as needed  Methocarbamol 500 mg 3 times daily x 1 week then as needed  Wear brace at all times  No walking at work seated work only       The following portions of the patient's history were reviewed and updated as appropriate: allergies, current medications, past family history, past medical history, past social history, past surgical history, and problem list.    Vitals:    01/27/25 1331   Pulse: 83   Temp: 96.9 °F (36.1 °C)   TempSrc: Infrared   SpO2: 95%   Weight: 68.9 kg (152 lb)   Height: 170.2 cm (67.01\")       Past Medical History:   Diagnosis Date    Hearing loss     Hypertension     Kidney stone     " Mental retardation     Neurogenic bladder     Seizure disorder     onset 16-17 years old, last seizure about 2015 when weaning phenobarb     Past Surgical History:   Procedure Laterality Date    BLADDER REPAIR      CATARACT EXTRACTION      ORIF PATELLA FRACTURE Left     THORACOTOMY      TYMPANOMASTOIDECTOMY       Family History   Family history unknown: Yes     Immunization History   Administered Date(s) Administered    COVID-19 (MODERNA) 1st,2nd,3rd Dose Monovalent 01/11/2021, 02/08/2021, 10/17/2021    COVID-19 (MODERNA) Monovalent Original Booster 11/24/2021, 08/18/2022    Flu Vaccine Intradermal Quad 18-64YR 10/05/2018, 11/19/2021    Flu Vaccine Quad PF 6-35MO 11/23/2016    Flu Vaccine Quad PF >36MO 11/23/2016, 11/12/2019    Fluzone  >6mos 10/26/2017    Fluzone (or Fluarix & Flulaval for VFC) >6mos 10/21/2020, 11/19/2021, 10/21/2022    Fluzone Quad >6mos (Multi-dose) 10/06/2015    H1N1 Inj 12/03/2009    Hepatitis A 05/23/2018, 11/16/2018    Influenza Inj MDCK Preserative Free 10/05/2018    Influenza Injectable Mdck Pf Quad 11/20/2019    Influenza Seasonal Injectable 10/06/2015, 03/14/2017    Influenza Whole 12/07/1999    Influenza, Unspecified 10/15/2024    PPD Test 11/06/2002, 11/06/2007, 11/10/2009, 11/12/2010, 11/15/2011, 11/27/2012, 12/03/2013, 11/05/2014, 11/03/2015, 01/04/2016, 07/12/2017, 06/13/2018, 06/05/2019, 10/20/2020, 10/19/2021, 11/01/2022, 01/23/2024    Td (TDVAX) 06/13/2014    Tdap 05/22/2009       Orders Only on 10/10/2024   Component Date Value Ref Range Status    IgG 10/10/2024 839  603 - 1,613 mg/dL Final    IgA 10/10/2024 457 (H)  61 - 437 mg/dL Final    IgM 10/10/2024 145  20 - 172 mg/dL Final    Total Protein 10/10/2024 6.6  6.0 - 8.5 g/dL Final    Albumin 10/10/2024 3.6  2.9 - 4.4 g/dL Final    Alpha-1-Globulin 10/10/2024 0.2  0.0 - 0.4 g/dL Final    Alpha-2-Globulin 10/10/2024 1.0  0.4 - 1.0 g/dL Final    Beta Globulin 10/10/2024 1.1  0.7 - 1.3 g/dL Final    Gamma Globulin 10/10/2024 0.8   0.4 - 1.8 g/dL Final    M-Evaristo 10/10/2024 Not Observed  Not Observed g/dL Final    Globulin 10/10/2024 3.0  2.2 - 3.9 g/dL Final    A/G Ratio 10/10/2024 1.3  0.7 - 1.7 Final    Immunofixation Reflex, Serum 10/10/2024 Comment   Final    No monoclonality detected.    Please note 10/10/2024 Comment   Final    Comment: Protein electrophoresis scan will follow via computer, mail, or   delivery.      Free Light Chain, [place] 10/10/2024 71.2 (H)  3.3 - 19.4 mg/L Final    Free Lambda Light Chains 10/10/2024 32.8 (H)  5.7 - 26.3 mg/L Final    Kappa/Lambda Ratio 10/10/2024 2.17 (H)  0.26 - 1.65 Final    WBC 10/10/2024 7.1  3.4 - 10.8 x10E3/uL Final    RBC 10/10/2024 3.63 (L)  4.14 - 5.80 x10E6/uL Final    Hemoglobin 10/10/2024 11.8 (L)  13.0 - 17.7 g/dL Final    Hematocrit 10/10/2024 36.0 (L)  37.5 - 51.0 % Final    MCV 10/10/2024 99 (H)  79 - 97 fL Final    MCH 10/10/2024 32.5  26.6 - 33.0 pg Final    MCHC 10/10/2024 32.8  31.5 - 35.7 g/dL Final    RDW 10/10/2024 11.7  11.6 - 15.4 % Final    Platelets 10/10/2024 229  150 - 450 x10E3/uL Final    Neutrophil Rel % 10/10/2024 73  Not Estab. % Final    Lymphocyte Rel % 10/10/2024 9  Not Estab. % Final    Monocyte Rel % 10/10/2024 10  Not Estab. % Final    Eosinophil Rel % 10/10/2024 6  Not Estab. % Final    Basophil Rel % 10/10/2024 1  Not Estab. % Final    Neutrophils Absolute 10/10/2024 5.2  1.4 - 7.0 x10E3/uL Final    Lymphocytes Absolute 10/10/2024 0.7  0.7 - 3.1 x10E3/uL Final    Monocytes Absolute 10/10/2024 0.7  0.1 - 0.9 x10E3/uL Final    Eosinophils Absolute 10/10/2024 0.4  0.0 - 0.4 x10E3/uL Final    Basophils Absolute 10/10/2024 0.1  0.0 - 0.2 x10E3/uL Final    Immature Granulocyte Rel % 10/10/2024 1  Not Estab. % Final    Immature Grans Absolute 10/10/2024 0.0  0.0 - 0.1 x10E3/uL Final    LDH 10/10/2024 187  121 - 224 IU/L Final    Haptoglobin 10/10/2024 221  32 - 363 mg/dL Final    Reticulocyte Absolute 10/10/2024 1.1  0.6 - 2.6 % Final         Review of  Systems   Constitutional: Negative.    HENT: Negative.     Respiratory: Negative.     Cardiovascular: Negative.    Gastrointestinal: Negative.    Genitourinary: Negative.    Musculoskeletal:         Left knee pain   Skin: Negative.    Neurological: Negative.    Psychiatric/Behavioral: Negative.         Objective   Physical Exam  Constitutional:       Appearance: Normal appearance.   HENT:      Head: Normocephalic.   Cardiovascular:      Rate and Rhythm: Normal rate and regular rhythm.      Pulses: Normal pulses.      Heart sounds: Normal heart sounds.   Pulmonary:      Effort: Pulmonary effort is normal.      Breath sounds: Normal breath sounds.   Abdominal:      General: Bowel sounds are normal.   Musculoskeletal:      Comments:  patient wearing brace complains of pain on outer aspect of knee and above the knee on the thigh   Skin:     General: Skin is warm.   Neurological:      General: No focal deficit present.      Mental Status: He is alert and oriented to person, place, and time.   Psychiatric:         Mood and Affect: Mood normal.         Behavior: Behavior normal.         Procedures    Assessment & Plan   Diagnoses and all orders for this visit:    1. Acute pain of left knee (Primary)  -     XR Knee 1 or 2 View Left (In Office); Future    Other orders  -     methocarbamol (ROBAXIN) 500 MG tablet; One tab 1-3x a day as needed  Dispense: 30 tablet; Refill: 1  -     naproxen sodium (Aleve) 220 MG tablet; One tab 2x a day for 5 days only with food  Dispense: 30 tablet; Refill: 1           Current Outpatient Medications:     acetaminophen (TYLENOL) 325 MG tablet, TAKE 2 TABLETS BY MOUTH EVERY 4 HOURS AS NEEDED FOR FEVER AND PAIN ( MAX 12 DOSES IN 24 HOURS), Disp: 60 tablet, Rfl: 10    alendronate (FOSAMAX) 70 MG tablet, TAKE 1 TABLET BY MOUTH WEEKLY .TAKE WITH 8OZ WATER BEFORE ANY FOOD OR DRINK. DO NOT LIE DOWN FOR 30 MINUTES, Disp: 4 tablet, Rfl: 0    Allergy 4 MG tablet, TAKE 1 TABLET BY MOUTH 4 TO 6 HOURS AS  NEEDED FOR ALLERGIES AND RUNNY NOSE, Disp: 24 tablet, Rfl: 0    Calcium + Vitamin D3 600-10 MG-MCG tablet per tablet, TAKE 1 TABLET BY MOUTH TWICE DAILY, Disp: 60 tablet, Rfl: 0    chlorhexidine (PERIDEX) 0.12 % solution, SWISH AND SPIT WITH A 1/2 OUNCE TWICE DAILY, Disp: 946 mL, Rfl: 0    Colloidal Oatmeal (Aveeno Eczema Therapy) 1 % cream, Apply to dry skin 3 times a day, Disp: 354 each, Rfl: 5    Combigan 0.2-0.5 % ophthalmic solution, , Disp: , Rfl:     COVID-19 At Home Antigen Test (QuickVue At-Home Covid-19 Test) kit, USE AS NEEDED FOR COVID 19 SYMPTOMS, Disp: 2 kit, Rfl: 2    dexAMETHasone (DECADRON) 0.1 % ophthalmic solution, , Disp: , Rfl:     DOCUSIL 100 MG capsule, TAKE ONE (1) CAPSULE BY MOUTH TWICE A DAY, Disp: 60 capsule, Rfl: 3    famotidine (PEPCID) 20 MG tablet, TAKE 1 TABLET BY MOUTH TWICE DAILY, Disp: 60 tablet, Rfl: 0    fenofibrate (TRICOR) 48 MG tablet, TAKE 1 TABLET BY MOUTH AT BEDTIME, Disp: 30 tablet, Rfl: 0    FeroSul 325 (65 Fe) MG tablet, TAKE 1 TABLET BY MOUTH TWICE DAILY, Disp: 60 tablet, Rfl: 0    GNP Antacid Regular Strength 200-200-20 MG/5ML suspension, , Disp: , Rfl:     GNP Milk of Magnesia 1200 MG/15ML suspension, , Disp: , Rfl:     hydrocortisone 2.5 % cream, , Disp: , Rfl:     lisinopril (PRINIVIL,ZESTRIL) 5 MG tablet, TAKE 1 TABLET BY MOUTH EVERY DAY IF BLOOD PRESSURE OVER 150/80 CAN GIVE ANOTHER 5MG TABLET AS NEEDED (RUN 28 FOR DISPILL, WILL ORDER 2ND DOSE PRN), Disp: 60 tablet, Rfl: 0    loperamide (IMODIUM) 2 MG capsule, , Disp: , Rfl:     loratadine (CLARITIN) 10 MG tablet, TAKE 1 TABLET BY MOUTH EVERY DAY, Disp: 30 tablet, Rfl: 0    mupirocin (BACTROBAN) 2 % ointment, APPLY TO RIGHT 5TH TOE TWICE DAILY, Disp: , Rfl:     naproxen sodium (Aleve) 220 MG tablet, One tab 2x a day for 5 days only with food, Disp: 30 tablet, Rfl: 1    Neomycin-Bacitracin-Polymyxin (GNP TRIPLE ANTIBIOTIC) 3.5-400-5000 ointment, FOLLOW DIRECTIONS ON LABEL AS NEEDED TO PREVENT INFECTIONS IN MINOR  CUTS AND SCRAPES. IF NO IMPROVEMENT IN 48 HOURS NOTIFY NURSE, Disp: 28.4 g, Rfl: 12    nitrofurantoin (Macrodantin) 100 MG capsule, Take 1 capsule by mouth 2 (Two) Times a Day., Disp: 14 capsule, Rfl: 0    omeprazole (priLOSEC) 20 MG capsule, TAKE 1 CAPSULE BY MOUTH EVERY DAY AS NEEDED ( BUBBLE ), Disp: 30 capsule, Rfl: 0    OXcarbazepine (TRILEPTAL) 600 MG tablet, TAKE 1 TABLET BY MOUTH EVERY MORNING AND 2 TABLETS EVERY EVENING, Disp: 90 tablet, Rfl: 10    PHENobarbital 97.2 MG tablet, TAKE 1 TABLET BY MOUTH EVERY NIGHT AT BEDTIME (NARC SHEET), Disp: 90 tablet, Rfl: 3    polyethylene glycol (MiraLax) 17 GM/SCOOP powder, Take 17 g by mouth Daily. Mix with water 17g daily with 8oz water prn, Disp: 1 each, Rfl: 2    Psyllium (Metamucil [facility] Fiber) 55.46 % powder, Take 1 teaspoon(s) by mouth 2 (Two) Times a Day. 1 tsp 1-2 x day as needed, Disp: 1 g, Rfl: 1    risperiDONE (risperDAL) 2 MG tablet, TAKE 1 TABLET BY MOUTH TWICE DAILY, Disp: 56 tablet, Rfl: 5    Robafen 100 MG/5ML liquid, , Disp: , Rfl:     tamsulosin (FLOMAX) 0.4 MG capsule 24 hr capsule, Take 1 capsule by mouth Daily., Disp: , Rfl:     tolnaftate (TINACTIN) 1 % external solution, Apply twice a day to affected area until resolved, Disp: 29.5 mL, Rfl: 2    white petrolatum gel gel, Apply 1 application topically to the appropriate area as directed As Needed (Dry skin). Apply to hands at night and cover with cotton gloves overnight, Disp: 212 g, Rfl: 2    methocarbamol (ROBAXIN) 500 MG tablet, One tab 1-3x a day as needed, Disp: 30 tablet, Rfl: 1           Dang Holder, APRN 1/27/2025 14:09 EST  This note has been electronically signed

## 2025-01-30 ENCOUNTER — TELEPHONE (OUTPATIENT)
Dept: FAMILY MEDICINE CLINIC | Facility: CLINIC | Age: 65
End: 2025-01-30
Payer: MEDICARE

## 2025-01-30 DIAGNOSIS — S89.92XA INJURY OF LEFT KNEE, INITIAL ENCOUNTER: Primary | ICD-10-CM

## 2025-01-30 NOTE — TELEPHONE ENCOUNTER
Caller: [facility]    Relationship: Emergency Contact    Best call back number: [phone]    REQUESTING A WALKER FOR THIS PATIENT. NOTHING FANCY JUST A STANDARD WALKER.    PLEASE ADVISE

## 2025-01-30 NOTE — TELEPHONE ENCOUNTER
I called jael and she would like to have a referral to physical therapy. He is not walking well. He is looking for things to lean out and they are not stable.

## 2025-02-03 RX ORDER — LOPERAMIDE HYDROCHLORIDE 2 MG/1
CAPSULE ORAL
Qty: 30 CAPSULE | Refills: 10 | Status: SHIPPED | OUTPATIENT
Start: 2025-02-03

## 2025-02-03 RX ORDER — CHLORHEXIDINE GLUCONATE ORAL RINSE 1.2 MG/ML
SOLUTION DENTAL
Qty: 946 ML | Refills: 0 | Status: SHIPPED | OUTPATIENT
Start: 2025-02-03

## 2025-02-03 RX ORDER — HYDROCORTISONE 25 MG/G
CREAM TOPICAL
Qty: 28.35 G | Refills: 10 | Status: SHIPPED | OUTPATIENT
Start: 2025-02-03

## 2025-02-03 RX ORDER — MAGNESIUM HYDROXIDE 1200 MG/15ML
LIQUID ORAL
Qty: 355 ML | Refills: 10 | Status: SHIPPED | OUTPATIENT
Start: 2025-02-03

## 2025-02-06 ENCOUNTER — OFFICE VISIT (OUTPATIENT)
Age: 65
End: 2025-02-06
Payer: MEDICARE

## 2025-02-06 VITALS — OXYGEN SATURATION: 97 % | RESPIRATION RATE: 20 BRPM | HEIGHT: 67 IN | BODY MASS INDEX: 23.86 KG/M2 | WEIGHT: 152 LBS

## 2025-02-06 DIAGNOSIS — S86.912A KNEE STRAIN, LEFT, INITIAL ENCOUNTER: Primary | ICD-10-CM

## 2025-02-06 RX ORDER — TRIAMCINOLONE ACETONIDE 40 MG/ML
80 INJECTION, SUSPENSION INTRA-ARTICULAR; INTRAMUSCULAR
Status: COMPLETED | OUTPATIENT
Start: 2025-02-06 | End: 2025-02-06

## 2025-02-06 RX ORDER — LIDOCAINE HYDROCHLORIDE 10 MG/ML
2 INJECTION, SOLUTION EPIDURAL; INFILTRATION; INTRACAUDAL; PERINEURAL
Status: COMPLETED | OUTPATIENT
Start: 2025-02-06 | End: 2025-02-06

## 2025-02-06 RX ADMIN — LIDOCAINE HYDROCHLORIDE 2 ML: 10 INJECTION, SOLUTION EPIDURAL; INFILTRATION; INTRACAUDAL; PERINEURAL at 11:13

## 2025-02-06 RX ADMIN — TRIAMCINOLONE ACETONIDE 80 MG: 40 INJECTION, SUSPENSION INTRA-ARTICULAR; INTRAMUSCULAR at 11:13

## 2025-02-19 ENCOUNTER — OFFICE VISIT (OUTPATIENT)
Dept: FAMILY MEDICINE CLINIC | Facility: CLINIC | Age: 65
End: 2025-02-19
Payer: MEDICARE

## 2025-02-19 VITALS
WEIGHT: 157 LBS | TEMPERATURE: 96.9 F | HEIGHT: 67 IN | SYSTOLIC BLOOD PRESSURE: 121 MMHG | OXYGEN SATURATION: 96 % | HEART RATE: 77 BPM | BODY MASS INDEX: 24.64 KG/M2 | DIASTOLIC BLOOD PRESSURE: 76 MMHG

## 2025-02-19 DIAGNOSIS — S83.242D ACUTE MEDIAL MENISCUS TEAR OF LEFT KNEE, SUBSEQUENT ENCOUNTER: ICD-10-CM

## 2025-02-19 DIAGNOSIS — R79.89 ELEVATED SERUM CREATININE: Primary | ICD-10-CM

## 2025-02-19 PROBLEM — S83.242A ACUTE MEDIAL MENISCUS TEAR OF LEFT KNEE: Status: ACTIVE | Noted: 2025-02-19

## 2025-02-19 NOTE — PROGRESS NOTES
Manuel Gaspar is a 64 y.o. male.     History of Present Illness  64-year-old white male who lives in a group home with history of MR, anxiety, schizophrenia, osteoporosis, hypertension, kidney stones, chronic anemia, neurogenic bladder, hearing loss, chronic hyponatremia, chronic UTIs with negative urine cultures to go to urology, seizure disorder and recent left knee injury to meniscus who comes in today for 3-month follow-up visit with caregiver    Blood pressure 140/76 heart rate 76 he denies any chest pain, dyspnea, tachycardia or dizziness    Patient wearing a knee brace and doing physical therapy for his meniscus tear in the left knee.  According to Ortho's note they did not think patient would tolerate an MRI to really know for sure what is going on in the knee however the caregiver disagrees and she is going to address it with their next appointment on March 11.  Patient does not seem to be a great deal of pain he does use Tylenol when needed and normally he will ask for it according to caregiver    Patient's last creatinine 1.33 and that has been where it has been staying majority of the time it has been stable we will recheck again today he does not go to nephrology and does not take a lot of NSAIDs    Weight is up 5 pounds at 157 with a BMI of 24.6.  He is up-to-date on all immunizations which are done at the facility, he is up-to-date on eye exams and he goes to urology for his PSAs.  He is up-to-date on colonoscopies through Dr. Valentine's office          CMP  Keep appointment with Ortho  Inquire about MRI  Follow-up 3 months       The following portions of the patient's history were reviewed and updated as appropriate: allergies, current medications, past family history, past medical history, past social history, past surgical history, and problem list.    Vitals:    02/19/25 1044   BP: 121/76   BP Location: Right arm   Patient Position: Sitting   Cuff Size: Adult   Pulse: 77   Temp: 96.9 °F (36.1 °C)  "  TempSrc: Infrared   SpO2: 96%   Weight: 71.2 kg (157 lb)   Height: 170.2 cm (67.01\")       Past Medical History:   Diagnosis Date    Hearing loss     Hypertension     Kidney stone     Mental retardation     Neurogenic bladder     Seizure disorder     onset 16-17 years old, last seizure about 2015 when weaning phenobarb     Past Surgical History:   Procedure Laterality Date    BLADDER REPAIR      CATARACT EXTRACTION      ORIF PATELLA FRACTURE Left     THORACOTOMY      TYMPANOMASTOIDECTOMY       Family History   Family history unknown: Yes     Immunization History   Administered Date(s) Administered    COVID-19 (MODERNA) 1st,2nd,3rd Dose Monovalent 01/11/2021, 02/08/2021, 10/17/2021    COVID-19 (MODERNA) Monovalent Original Booster 11/24/2021, 08/18/2022    Flu Vaccine Intradermal Quad 18-64YR 10/05/2018, 11/19/2021    Flu Vaccine Quad PF 6-35MO 11/23/2016    Flu Vaccine Quad PF >36MO 11/23/2016, 11/12/2019    Fluzone  >6mos 10/26/2017    Fluzone (or Fluarix & Flulaval for VFC) >6mos 10/21/2020, 11/19/2021, 10/21/2022    Fluzone Quad >6mos (Multi-dose) 10/06/2015    H1N1 Inj 12/03/2009    Hepatitis A 05/23/2018, 11/16/2018    Influenza Inj MDCK Preserative Free 10/05/2018    Influenza Injectable Mdck Pf Quad 11/20/2019    Influenza Seasonal Injectable 10/06/2015, 03/14/2017    Influenza Whole 12/07/1999    Influenza, Unspecified 10/15/2024    PPD Test 11/06/2002, 11/06/2007, 11/10/2009, 11/12/2010, 11/15/2011, 11/27/2012, 12/03/2013, 11/05/2014, 11/03/2015, 01/04/2016, 07/12/2017, 06/13/2018, 06/05/2019, 10/20/2020, 10/19/2021, 11/01/2022, 01/23/2024    Td (TDVAX) 06/13/2014    Tdap 05/22/2009       Orders Only on 10/10/2024   Component Date Value Ref Range Status    IgG 10/10/2024 839  603 - 1,613 mg/dL Final    IgA 10/10/2024 457 (H)  61 - 437 mg/dL Final    IgM 10/10/2024 145  20 - 172 mg/dL Final    Total Protein 10/10/2024 6.6  6.0 - 8.5 g/dL Final    Albumin 10/10/2024 3.6  2.9 - 4.4 g/dL Final    " Alpha-1-Globulin 10/10/2024 0.2  0.0 - 0.4 g/dL Final    Alpha-2-Globulin 10/10/2024 1.0  0.4 - 1.0 g/dL Final    Beta Globulin 10/10/2024 1.1  0.7 - 1.3 g/dL Final    Gamma Globulin 10/10/2024 0.8  0.4 - 1.8 g/dL Final    M-Evaristo 10/10/2024 Not Observed  Not Observed g/dL Final    Globulin 10/10/2024 3.0  2.2 - 3.9 g/dL Final    A/G Ratio 10/10/2024 1.3  0.7 - 1.7 Final    Immunofixation Reflex, Serum 10/10/2024 Comment   Final    No monoclonality detected.    Please note 10/10/2024 Comment   Final    Comment: Protein electrophoresis scan will follow via computer, mail, or   delivery.      Free Light Chain, [place] 10/10/2024 71.2 (H)  3.3 - 19.4 mg/L Final    Free Lambda Light Chains 10/10/2024 32.8 (H)  5.7 - 26.3 mg/L Final    Kappa/Lambda Ratio 10/10/2024 2.17 (H)  0.26 - 1.65 Final    WBC 10/10/2024 7.1  3.4 - 10.8 x10E3/uL Final    RBC 10/10/2024 3.63 (L)  4.14 - 5.80 x10E6/uL Final    Hemoglobin 10/10/2024 11.8 (L)  13.0 - 17.7 g/dL Final    Hematocrit 10/10/2024 36.0 (L)  37.5 - 51.0 % Final    MCV 10/10/2024 99 (H)  79 - 97 fL Final    MCH 10/10/2024 32.5  26.6 - 33.0 pg Final    MCHC 10/10/2024 32.8  31.5 - 35.7 g/dL Final    RDW 10/10/2024 11.7  11.6 - 15.4 % Final    Platelets 10/10/2024 229  150 - 450 x10E3/uL Final    Neutrophil Rel % 10/10/2024 73  Not Estab. % Final    Lymphocyte Rel % 10/10/2024 9  Not Estab. % Final    Monocyte Rel % 10/10/2024 10  Not Estab. % Final    Eosinophil Rel % 10/10/2024 6  Not Estab. % Final    Basophil Rel % 10/10/2024 1  Not Estab. % Final    Neutrophils Absolute 10/10/2024 5.2  1.4 - 7.0 x10E3/uL Final    Lymphocytes Absolute 10/10/2024 0.7  0.7 - 3.1 x10E3/uL Final    Monocytes Absolute 10/10/2024 0.7  0.1 - 0.9 x10E3/uL Final    Eosinophils Absolute 10/10/2024 0.4  0.0 - 0.4 x10E3/uL Final    Basophils Absolute 10/10/2024 0.1  0.0 - 0.2 x10E3/uL Final    Immature Granulocyte Rel % 10/10/2024 1  Not Estab. % Final    Immature Grans Absolute 10/10/2024 0.0  0.0  - 0.1 x10E3/uL Final    LDH 10/10/2024 187  121 - 224 IU/L Final    Haptoglobin 10/10/2024 221  32 - 363 mg/dL Final    Reticulocyte Absolute 10/10/2024 1.1  0.6 - 2.6 % Final         Review of Systems   Constitutional: Negative.    HENT: Negative.     Respiratory: Negative.     Cardiovascular: Negative.    Gastrointestinal: Negative.    Genitourinary: Negative.    Musculoskeletal:         Left knee injury   Skin: Negative.    Neurological: Negative.    Psychiatric/Behavioral: Negative.         Objective   Physical Exam  Constitutional:       Appearance: Normal appearance.   HENT:      Head: Normocephalic.   Cardiovascular:      Rate and Rhythm: Normal rate and regular rhythm.      Pulses: Normal pulses.      Heart sounds: Normal heart sounds.   Pulmonary:      Effort: Pulmonary effort is normal.      Breath sounds: Normal breath sounds.   Abdominal:      General: Bowel sounds are normal.   Musculoskeletal:      Comments: Left knee with Velcro brace patient using walker to ambulate currently in physical therapy   Skin:     General: Skin is warm.   Neurological:      General: No focal deficit present.      Mental Status: He is alert and oriented to person, place, and time.   Psychiatric:         Mood and Affect: Mood normal.         Behavior: Behavior normal.         Procedures    Assessment & Plan   Diagnoses and all orders for this visit:    1. Elevated serum creatinine (Primary)  -     Comprehensive Metabolic Panel           Current Outpatient Medications:     acetaminophen (TYLENOL) 325 MG tablet, TAKE 2 TABLETS BY MOUTH EVERY 4 HOURS AS NEEDED FOR FEVER AND PAIN ( MAX 12 DOSES IN 24 HOURS), Disp: 60 tablet, Rfl: 10    alendronate (FOSAMAX) 70 MG tablet, TAKE 1 TABLET BY MOUTH WEEKLY .TAKE WITH 8OZ WATER BEFORE ANY FOOD OR DRINK. DO NOT LIE DOWN FOR 30 MINUTES, Disp: 4 tablet, Rfl: 0    Allergy 4 MG tablet, TAKE 1 TABLET BY MOUTH 4 TO 6 HOURS AS NEEDED FOR ALLERGIES AND RUNNY NOSE, Disp: 24 tablet, Rfl: 0     Calcium + Vitamin D3 600-10 MG-MCG tablet per tablet, TAKE 1 TABLET BY MOUTH TWICE DAILY, Disp: 60 tablet, Rfl: 0    chlorhexidine (PERIDEX) 0.12 % solution, SWISH AND SPIT WITH A 1/2 OUNCE TWICE DAILY, Disp: 946 mL, Rfl: 0    Colloidal Oatmeal (Aveeno Eczema Therapy) 1 % cream, Apply to dry skin 3 times a day, Disp: 354 each, Rfl: 5    Combigan 0.2-0.5 % ophthalmic solution, , Disp: , Rfl:     COVID-19 At Home Antigen Test (QuickVue At-Home Covid-19 Test) kit, USE AS NEEDED FOR COVID 19 SYMPTOMS, Disp: 2 kit, Rfl: 2    dexAMETHasone (DECADRON) 0.1 % ophthalmic solution, , Disp: , Rfl:     DOCUSIL 100 MG capsule, TAKE ONE (1) CAPSULE BY MOUTH TWICE A DAY, Disp: 60 capsule, Rfl: 3    famotidine (PEPCID) 20 MG tablet, TAKE 1 TABLET BY MOUTH TWICE DAILY, Disp: 60 tablet, Rfl: 0    fenofibrate (TRICOR) 48 MG tablet, TAKE 1 TABLET BY MOUTH AT BEDTIME, Disp: 30 tablet, Rfl: 0    FeroSul 325 (65 Fe) MG tablet, TAKE 1 TABLET BY MOUTH TWICE DAILY, Disp: 60 tablet, Rfl: 0    GNP Antacid Regular Strength 200-200-20 MG/5ML suspension, , Disp: , Rfl:     GNP Milk of Magnesia 1200 MG/15ML suspension, TAKE 2 TABLESPOONS (30ML) BY MOUTH UP TO 4 X DAILY AS NEEDED FOR CONSTIPATION. NOTIFY NURSE IF NO BOWEL MOVEMENT FOR 6 CONSECUTIVE SHIFTS, Disp: 355 mL, Rfl: 10    hydrocortisone 2.5 % cream, FOLLOW DIRECTIONS ON LABEL AS NEEDED FOR TEMPORARY RELIEF OF ITCHING DUE TO INSECT BITES AND RASHES. IF NO IMPROVEMENT IS NOTED WITHIN 48 HOURS NOTIFY NURSE, Disp: 28.35 g, Rfl: 10    lisinopril (PRINIVIL,ZESTRIL) 5 MG tablet, TAKE 1 TABLET BY MOUTH EVERY DAY IF BLOOD PRESSURE OVER 150/80 CAN GIVE ANOTHER 5MG TABLET AS NEEDED (RUN 28 FOR DISPILL, WILL ORDER 2ND DOSE PRN), Disp: 60 tablet, Rfl: 0    loperamide (IMODIUM) 2 MG capsule, TAKE 2 CAPSULES BY MOUTH AT ONSET OF DIARRHEA THEN 1 CAPSULE AFTER EACH LOOSE STOOL AS NEEDED FOR MINOR DIARRHEA. MAX OF 8 DOSES PER 24 HOURS, Disp: 30 capsule, Rfl: 10    loratadine (CLARITIN) 10 MG tablet, TAKE 1  TABLET BY MOUTH EVERY DAY, Disp: 30 tablet, Rfl: 0    methocarbamol (ROBAXIN) 500 MG tablet, One tab 1-3x a day as needed, Disp: 30 tablet, Rfl: 1    mupirocin (BACTROBAN) 2 % ointment, APPLY TO RIGHT 5TH TOE TWICE DAILY, Disp: , Rfl:     naproxen sodium (Aleve) 220 MG tablet, One tab 2x a day for 5 days only with food, Disp: 30 tablet, Rfl: 1    Neomycin-Bacitracin-Polymyxin (GNP TRIPLE ANTIBIOTIC) 3.5-400-5000 ointment, FOLLOW DIRECTIONS ON LABEL AS NEEDED TO PREVENT INFECTIONS IN MINOR CUTS AND SCRAPES. IF NO IMPROVEMENT IN 48 HOURS NOTIFY NURSE, Disp: 28.4 g, Rfl: 12    nitrofurantoin (Macrodantin) 100 MG capsule, Take 1 capsule by mouth 2 (Two) Times a Day., Disp: 14 capsule, Rfl: 0    omeprazole (priLOSEC) 20 MG capsule, TAKE 1 CAPSULE BY MOUTH EVERY DAY AS NEEDED ( BUBBLE ), Disp: 30 capsule, Rfl: 0    OXcarbazepine (TRILEPTAL) 600 MG tablet, TAKE 1 TABLET BY MOUTH EVERY MORNING AND 2 TABLETS EVERY EVENING, Disp: 90 tablet, Rfl: 10    PHENobarbital 97.2 MG tablet, TAKE 1 TABLET BY MOUTH EVERY NIGHT AT BEDTIME (NARC SHEET), Disp: 90 tablet, Rfl: 3    polyethylene glycol (MiraLax) 17 GM/SCOOP powder, Take 17 g by mouth Daily. Mix with water 17g daily with 8oz water prn, Disp: 1 each, Rfl: 2    Psyllium (Metamucil [facility] Fiber) 55.46 % powder, Take 1 teaspoon(s) by mouth 2 (Two) Times a Day. 1 tsp 1-2 x day as needed, Disp: 1 g, Rfl: 1    risperiDONE (risperDAL) 2 MG tablet, TAKE 1 TABLET BY MOUTH TWICE DAILY, Disp: 56 tablet, Rfl: 5    Robafen 100 MG/5ML liquid, , Disp: , Rfl:     tamsulosin (FLOMAX) 0.4 MG capsule 24 hr capsule, Take 1 capsule by mouth Daily., Disp: , Rfl:     tolnaftate (TINACTIN) 1 % external solution, Apply twice a day to affected area until resolved, Disp: 29.5 mL, Rfl: 2    white petrolatum gel gel, Apply 1 application topically to the appropriate area as directed As Needed (Dry skin). Apply to hands at night and cover with cotton gloves overnight, Disp: 212 g, Rfl: 2            Dang Holder, APRN 2/19/2025 11:14 EST  This note has been electronically signed

## 2025-02-20 LAB
ALBUMIN SERPL-MCNC: 4.1 G/DL (ref 3.9–4.9)
ALP SERPL-CCNC: 201 IU/L (ref 44–121)
ALT SERPL-CCNC: 17 IU/L (ref 0–44)
AST SERPL-CCNC: 18 IU/L (ref 0–40)
BILIRUB SERPL-MCNC: <0.2 MG/DL (ref 0–1.2)
BUN SERPL-MCNC: 27 MG/DL (ref 8–27)
BUN/CREAT SERPL: 22 (ref 10–24)
CALCIUM SERPL-MCNC: 9.5 MG/DL (ref 8.6–10.2)
CHLORIDE SERPL-SCNC: 94 MMOL/L (ref 96–106)
CO2 SERPL-SCNC: 25 MMOL/L (ref 20–29)
CREAT SERPL-MCNC: 1.23 MG/DL (ref 0.76–1.27)
EGFRCR SERPLBLD CKD-EPI 2021: 65 ML/MIN/1.73
GLOBULIN SER CALC-MCNC: 2.7 G/DL (ref 1.5–4.5)
GLUCOSE SERPL-MCNC: 88 MG/DL (ref 70–99)
POTASSIUM SERPL-SCNC: 4.2 MMOL/L (ref 3.5–5.2)
PROT SERPL-MCNC: 6.8 G/DL (ref 6–8.5)
SODIUM SERPL-SCNC: 132 MMOL/L (ref 134–144)

## 2025-02-21 DIAGNOSIS — F20.0 PARANOID SCHIZOPHRENIA: ICD-10-CM

## 2025-02-21 DIAGNOSIS — G40.109 SEIZURE, TEMPORAL LOBE: ICD-10-CM

## 2025-02-21 RX ORDER — OXCARBAZEPINE 600 MG/1
TABLET, FILM COATED ORAL
Qty: 90 TABLET | Refills: 9 | Status: SHIPPED | OUTPATIENT
Start: 2025-02-21

## 2025-02-23 RX ORDER — ALENDRONATE SODIUM 70 MG/1
TABLET ORAL
Qty: 4 TABLET | Refills: 0 | Status: SHIPPED | OUTPATIENT
Start: 2025-02-23

## 2025-02-23 RX ORDER — LORATADINE 10 MG/1
TABLET ORAL
Qty: 30 TABLET | Refills: 0 | Status: SHIPPED | OUTPATIENT
Start: 2025-02-23

## 2025-02-23 RX ORDER — FAMOTIDINE 20 MG/1
TABLET, FILM COATED ORAL
Qty: 60 TABLET | Refills: 0 | Status: SHIPPED | OUTPATIENT
Start: 2025-02-23

## 2025-02-23 RX ORDER — LISINOPRIL 5 MG/1
TABLET ORAL
Qty: 60 TABLET | Refills: 0 | Status: SHIPPED | OUTPATIENT
Start: 2025-02-23

## 2025-02-23 RX ORDER — CALCIUM CARBONATE/VITAMIN D3 600 MG-10
TABLET ORAL
Qty: 60 TABLET | Refills: 0 | Status: SHIPPED | OUTPATIENT
Start: 2025-02-23

## 2025-02-23 RX ORDER — FENOFIBRATE 48 MG/1
TABLET, COATED ORAL
Qty: 30 TABLET | Refills: 0 | Status: SHIPPED | OUTPATIENT
Start: 2025-02-23

## 2025-02-23 RX ORDER — FERROUS SULFATE 325(65) MG
TABLET ORAL
Qty: 60 TABLET | Refills: 0 | Status: SHIPPED | OUTPATIENT
Start: 2025-02-23

## 2025-02-24 RX ORDER — RISPERIDONE 2 MG/1
TABLET ORAL
Qty: 56 TABLET | Refills: 5 | Status: SHIPPED | OUTPATIENT
Start: 2025-02-24

## 2025-03-11 ENCOUNTER — OFFICE VISIT (OUTPATIENT)
Dept: ORTHOPEDIC SURGERY | Facility: CLINIC | Age: 65
End: 2025-03-11
Payer: MEDICARE

## 2025-03-11 VITALS — RESPIRATION RATE: 20 BRPM | WEIGHT: 157 LBS | OXYGEN SATURATION: 95 % | HEIGHT: 67 IN | BODY MASS INDEX: 24.64 KG/M2

## 2025-03-11 DIAGNOSIS — S86.912D KNEE STRAIN, LEFT, SUBSEQUENT ENCOUNTER: ICD-10-CM

## 2025-03-11 DIAGNOSIS — S86.912A KNEE STRAIN, LEFT, INITIAL ENCOUNTER: Primary | ICD-10-CM

## 2025-03-11 NOTE — PROGRESS NOTES
FOLLOW UP VISIT        Patient Name: Manuel Gaspar  : 1960  Primary Care Physician: Dang Holder APRN        Chief Complaint:  left knee pain s/p fall 25    HPI:   History of Present Illness  The patient presents for evaluation of knee pain.    He reports persistent mild knee pain, which he has been managing through exercise. He was previously engaged in physical therapy but has since been discharged.  He has been supplementing his treatment with acetaminophen, taken twice in the past week.             Past Medical/Surgical, Social and Family History:  I have reviewed and/or updated pertinent history as noted in the medical record including:  Past Medical History:   Diagnosis Date    Depression     Hearing loss     Hypertension     Kidney stone     Mental retardation     Neurogenic bladder     Psychiatric illness unknown    Psychosis unknown    Seizure disorder     onset 16-17 years old, last seizure about  when weaning phenobarb     Past Surgical History:   Procedure Laterality Date    BLADDER REPAIR      CATARACT EXTRACTION      COLONOSCOPY  2019    ORIF PATELLA FRACTURE Left     THORACOTOMY      TYMPANOMASTOIDECTOMY       Social History     Occupational History    Not on file   Tobacco Use    Smoking status: Never     Passive exposure: Never    Smokeless tobacco: Never   Vaping Use    Vaping status: Never Used   Substance and Sexual Activity    Alcohol use: No    Drug use: No    Sexual activity: Never      Social History     Social History Narrative    Not on file     Family History   Family history unknown: Yes       Allergies:   Allergies   Allergen Reactions    Levofloxacin Other (See Comments)    Sulfamethoxazole-Trimethoprim Other (See Comments)       Medications:   Home Medications:  Current Outpatient Medications on File Prior to Visit   Medication Sig    acetaminophen (TYLENOL) 325 MG tablet TAKE 2 TABLETS BY MOUTH EVERY 4 HOURS AS NEEDED FOR FEVER AND PAIN ( MAX 12 DOSES IN 24 HOURS)     alendronate (FOSAMAX) 70 MG tablet TAKE 1 TABLET BY MOUTH WEEKLY .TAKE WITH 8OZ WATER BEFORE ANY FOOD OR DRINK. DO NOT LIE DOWN FOR 30 MINUTES    Allergy 4 MG tablet TAKE 1 TABLET BY MOUTH 4 TO 6 HOURS AS NEEDED FOR ALLERGIES AND RUNNY NOSE    Calcium + Vitamin D3 600-10 MG-MCG tablet per tablet TAKE 1 TABLET BY MOUTH TWICE DAILY    chlorhexidine (PERIDEX) 0.12 % solution SWISH AND SPIT WITH A 1/2 OUNCE TWICE DAILY    Colloidal Oatmeal (Aveeno Eczema Therapy) 1 % cream Apply to dry skin 3 times a day    Combigan 0.2-0.5 % ophthalmic solution     COVID-19 At Home Antigen Test (QuickVue At-Home Covid-19 Test) kit USE AS NEEDED FOR COVID 19 SYMPTOMS    dexAMETHasone (DECADRON) 0.1 % ophthalmic solution     DOCUSIL 100 MG capsule TAKE ONE (1) CAPSULE BY MOUTH TWICE A DAY    famotidine (PEPCID) 20 MG tablet TAKE 1 TABLET BY MOUTH TWICE DAILY    fenofibrate (TRICOR) 48 MG tablet TAKE 1 TABLET BY MOUTH AT BEDTIME    FeroSul 325 (65 Fe) MG tablet TAKE 1 TABLET BY MOUTH TWICE DAILY    GNP Antacid Regular Strength 200-200-20 MG/5ML suspension     GNP Milk of Magnesia 1200 MG/15ML suspension TAKE 2 TABLESPOONS (30ML) BY MOUTH UP TO 4 X DAILY AS NEEDED FOR CONSTIPATION. NOTIFY NURSE IF NO BOWEL MOVEMENT FOR 6 CONSECUTIVE SHIFTS    hydrocortisone 2.5 % cream FOLLOW DIRECTIONS ON LABEL AS NEEDED FOR TEMPORARY RELIEF OF ITCHING DUE TO INSECT BITES AND RASHES. IF NO IMPROVEMENT IS NOTED WITHIN 48 HOURS NOTIFY NURSE    lisinopril (PRINIVIL,ZESTRIL) 5 MG tablet TAKE 1 TABLET BY MOUTH EVERY DAY IF BLOOD PRESSURE OVER 150/80 CAN GIVE ANOTHER 5MG TABLET AS NEEDED (RUN 28 FOR DISPILL, WILL ORDER 2ND DOSE PRN)    loperamide (IMODIUM) 2 MG capsule TAKE 2 CAPSULES BY MOUTH AT ONSET OF DIARRHEA THEN 1 CAPSULE AFTER EACH LOOSE STOOL AS NEEDED FOR MINOR DIARRHEA. MAX OF 8 DOSES PER 24 HOURS    loratadine (CLARITIN) 10 MG tablet TAKE 1 TABLET BY MOUTH EVERY DAY    methocarbamol (ROBAXIN) 500 MG tablet One tab 1-3x a day as needed    mupirocin  (BACTROBAN) 2 % ointment APPLY TO RIGHT 5TH TOE TWICE DAILY    naproxen sodium (Aleve) 220 MG tablet One tab 2x a day for 5 days only with food    Neomycin-Bacitracin-Polymyxin (GNP TRIPLE ANTIBIOTIC) 3.5-400-5000 ointment FOLLOW DIRECTIONS ON LABEL AS NEEDED TO PREVENT INFECTIONS IN MINOR CUTS AND SCRAPES. IF NO IMPROVEMENT IN 48 HOURS NOTIFY NURSE    nitrofurantoin (Macrodantin) 100 MG capsule Take 1 capsule by mouth 2 (Two) Times a Day.    omeprazole (priLOSEC) 20 MG capsule TAKE 1 CAPSULE BY MOUTH EVERY DAY AS NEEDED ( BUBBLE )    OXcarbazepine (TRILEPTAL) 600 MG tablet TAKE 1 TABLET BY MOUTH EVERY MORNING AND 2 TABLETS EVERY EVENING    PHENobarbital 97.2 MG tablet TAKE 1 TABLET BY MOUTH EVERY NIGHT AT BEDTIME (NARC SHEET)    polyethylene glycol (MiraLax) 17 GM/SCOOP powder Take 17 g by mouth Daily. Mix with water 17g daily with 8oz water prn    Psyllium (Metamucil [facility] Fiber) 55.46 % powder Take 1 teaspoon(s) by mouth 2 (Two) Times a Day. 1 tsp 1-2 x day as needed    risperiDONE (risperDAL) 2 MG tablet TAKE 1 TABLET BY MOUTH TWICE DAILY    Robafen 100 MG/5ML liquid     tamsulosin (FLOMAX) 0.4 MG capsule 24 hr capsule Take 1 capsule by mouth Daily.    tolnaftate (TINACTIN) 1 % external solution Apply twice a day to affected area until resolved    white petrolatum gel gel Apply 1 application topically to the appropriate area as directed As Needed (Dry skin). Apply to hands at night and cover with cotton gloves overnight     No current facility-administered medications on file prior to visit.         ROS:  14 point review of systems was negative except as listed in the HPI     Physical Exam:   64 y.o. male  Body mass index is 24.59 kg/m²., 71.2 kg (157 lb)  Vitals:    03/11/25 0901   Resp: 20   SpO2: 95%         General: Alert, cooperative, appears well and in no observable distress.   HEENT: Normocephalic, atraumatic on external visual inspection. No icterus.   CV: No significant peripheral edema.    Respiratory: Normal respiratory effort.   Skin: Warm & well perfused; appropriate skin turgor.  Psych: Appropriate mood & affect.  Neuro: Gross sensation and motor intact in affected extremity/extremities.  Vascular: Peripheral pulses palpable in affected extremity/extremities. Calves/compartments soft and non tender, no evidence of DVT or compartment syndrome.    Left Knee Exam     Range of Motion   The patient has normal left knee ROM.              Knee Musculoskeletal Exam  Gait    Gait is normal.    Inspection    Left      Left knee inspection is normal.     Palpation    Left      Increased warmth: none        Masses: none        Crepitus: patellofemoral, medial and lateral        Tenderness: none      Range of Motion    Left      Left knee range of motion is normal and full.        Investigations:  No new x-rays were needed today.              Assessment:  Assessment & Plan  1. Left Knee pain.  The patient's knee pain has shown improvement. He reports occasional pain, which is managed with acetaminophen as needed. He is advised to continue using Tylenol for pain management as required. No further intervention is deemed necessary at this time.    Follow-up  The patient will follow up as needed.    Body mass index is 24.59 kg/m².  BMI consistent with Normal: 18.5-24.9kg/m2  BMI is within normal parameters. No other follow-up for BMI required.    Patient encouraged to call with questions or concerns in the interim      HORTENCIA Vincent     Patient or patient representative verbalized consent for the use of Ambient Listening during the visit with  HORTENCIA Vincent for chart documentation. 3/11/2025  09:27 EDT

## 2025-03-12 ENCOUNTER — TELEMEDICINE (OUTPATIENT)
Dept: PSYCHIATRY | Facility: CLINIC | Age: 65
End: 2025-03-12
Payer: MEDICARE

## 2025-03-12 DIAGNOSIS — F20.0 PARANOID SCHIZOPHRENIA: Primary | Chronic | ICD-10-CM

## 2025-03-12 DIAGNOSIS — F41.1 GENERALIZED ANXIETY DISORDER: Chronic | ICD-10-CM

## 2025-03-12 DIAGNOSIS — F79 INTELLECTUAL DISABILITY: Chronic | ICD-10-CM

## 2025-03-12 NOTE — PROGRESS NOTES
Subjective   Manuel Gaspar is a 64 y.o.white male with MR who presents today for follow up via teleheatlh.    This provider is located at home address in [place], [place] for [facility] ([facility] [address], [place] [address] using a secure MyChart Video Visit through [employer] [facility]. Patient is being seen remotely via telehealth at their home address in [place], and stated they are in a secure environment for this session. Provider is currently licensed and credentialed in both the [facility] and [place].The patient's condition being diagnosed/treated is appropriate for telemedicine. The provider identified herself, as well as, her credentials.   The patient, and/or patients guardian, consent to be seen remotely, and when consent is given they understand that the consent allows for patient identifiable information to be sent to a third party as needed.   They may refuse to be seen remotely at any time. The electronic data is encrypted and password protected, and the patient and/or guardian has been advised of the potential risks to privacy not withstanding such measures.   Patient identifiers used: Name and .    You have chosen to receive care through a telehealth visit.  Do you consent to use a video/audio connection for your medical care today? Yes    The visit included audio and video interaction.  No technical issues occurred during the visit.     Chief Complaint   Patient presents with    Anxiety    Schizophrenia    Med Management     History of Present Illness:   He is still working at the work shop 4 days a week, he has new job at the work shop.  Slipped on the ice in  and tore his meniscus, saw Ortho and did PT for a month plus got a steroid shot     His , Cecile, is with him, he remains stable on Risperdal 2 mg with no behavioral issues.  No AVH  Dang vaca, PCP, did labs in 2023, including CMP and lipids  He is  still on Trileptal and Phenobarb for seizures.  Enjoys playing [employer] e[employer]   No AVH, no paranoid symptoms  Denies anxiety or depression  No agitation or aggression since he went back up on Risperdal, some paranoia at lower dosage  No SI/HI  Medications are fine, no need for changes.   Sleeps well, grumpy at times    The following portions of the patient's history were reviewed and updated as appropriate: allergies, current medications, past family history, past medical history, past social history, past surgical history and problem list.    PAST PSYCHIATRIC HISTORY  Axis I  Schizophrenia/cognitive disorder  Axis II  Learning Disorder    PAST OUTPATIENT TREATMENT  Diagnosis treated:  Cognitive Disorder  Treatment Type:  Medication Management  Prior Psychiatric Medications:  Risperdal  Trileptal  Support Groups:  None  Sequelae Of Mental Disorder:  emotional distress      Interval History  No Change    Side Effects  None    Past Psych History was reviewed and compared to 9/12/24 visit and no updates were needed.      Past Medical History:  Past Medical History:   Diagnosis Date    Depression     Hearing loss     Hypertension     Kidney stone     Mental retardation     Neurogenic bladder     Psychiatric illness unknown    Psychosis unknown    Seizure disorder     onset 16-17 years old, last seizure about 2015 when weaning phenobarb       Social History:  Social History     Socioeconomic History    Marital status: Single   Tobacco Use    Smoking status: Never     Passive exposure: Never    Smokeless tobacco: Never   Vaping Use    Vaping status: Never Used   Substance and Sexual Activity    Alcohol use: No    Drug use: No    Sexual activity: Never       Family History:  Family History   Family history unknown: Yes       Past Surgical History:  Past Surgical History:   Procedure Laterality Date    BLADDER REPAIR      CATARACT EXTRACTION      COLONOSCOPY  2019    ORIF PATELLA FRACTURE Left     THORACOTOMY       TYMPANOMASTOIDECTOMY         Problem List:  Patient Active Problem List   Diagnosis    Allergy status to unspecified drugs, medicaments and biological substances status    Anemia    Constipation    Deafness    Difficult or painful urination    Dysthymia    Encounter for lipid screening for cardiovascular disease    Encounter for screening    Encounter for screening for malignant neoplasm of prostate    Encounter for immunization    Therapeutic drug monitoring    Encounter for immunization    Encounter for general adult medical examination without abnormal findings    Enlarged prostate without lower urinary tract symptoms (luts)    Generalized anxiety disorder    High cholesterol    Hypertension, benign    Hyponatremia    Knee pain    Intellectual disability    Nephrolithiasis    Otalgia    Otitis media    Partial epilepsy with impairment of consciousness, intractable    Peptic ulcer disease    Schizophrenia    Seizure disorder    Serum creatinine raised    Weight loss, non-intentional    Positive hepatitis C antibody test    Other osteoporosis without current pathological fracture    Urethral stricture due to infection    Urinary frequency    Closed sleeve fracture of left patella with routine healing    Idiopathic hypotension    BMI 24.0-24.9, adult    Acute medial meniscus tear of left knee       Allergy:   Allergies   Allergen Reactions    Levofloxacin Other (See Comments)    Sulfamethoxazole-Trimethoprim Other (See Comments)        Discontinued Medications:  There are no discontinued medications.    Current Medications:   Current Outpatient Medications   Medication Sig Dispense Refill    acetaminophen (TYLENOL) 325 MG tablet TAKE 2 TABLETS BY MOUTH EVERY 4 HOURS AS NEEDED FOR FEVER AND PAIN ( MAX 12 DOSES IN 24 HOURS) 60 tablet 10    alendronate (FOSAMAX) 70 MG tablet TAKE 1 TABLET BY MOUTH WEEKLY .TAKE WITH 8OZ WATER BEFORE ANY FOOD OR DRINK. DO NOT LIE DOWN FOR 30 MINUTES 4 tablet 0    Allergy 4 MG tablet TAKE  1 TABLET BY MOUTH 4 TO 6 HOURS AS NEEDED FOR ALLERGIES AND RUNNY NOSE 24 tablet 0    Calcium + Vitamin D3 600-10 MG-MCG tablet per tablet TAKE 1 TABLET BY MOUTH TWICE DAILY 60 tablet 0    chlorhexidine (PERIDEX) 0.12 % solution SWISH AND SPIT WITH A 1/2 OUNCE TWICE DAILY 946 mL 0    Colloidal Oatmeal (Aveeno Eczema Therapy) 1 % cream Apply to dry skin 3 times a day 354 each 5    Combigan 0.2-0.5 % ophthalmic solution       COVID-19 At Home Antigen Test (QuickVue At-Home Covid-19 Test) kit USE AS NEEDED FOR COVID 19 SYMPTOMS 2 kit 2    dexAMETHasone (DECADRON) 0.1 % ophthalmic solution       DOCUSIL 100 MG capsule TAKE ONE (1) CAPSULE BY MOUTH TWICE A DAY 60 capsule 3    famotidine (PEPCID) 20 MG tablet TAKE 1 TABLET BY MOUTH TWICE DAILY 60 tablet 0    fenofibrate (TRICOR) 48 MG tablet TAKE 1 TABLET BY MOUTH AT BEDTIME 30 tablet 0    FeroSul 325 (65 Fe) MG tablet TAKE 1 TABLET BY MOUTH TWICE DAILY 60 tablet 0    GNP Antacid Regular Strength 200-200-20 MG/5ML suspension       GNP Milk of Magnesia 1200 MG/15ML suspension TAKE 2 TABLESPOONS (30ML) BY MOUTH UP TO 4 X DAILY AS NEEDED FOR CONSTIPATION. NOTIFY NURSE IF NO BOWEL MOVEMENT FOR 6 CONSECUTIVE SHIFTS 355 mL 10    hydrocortisone 2.5 % cream FOLLOW DIRECTIONS ON LABEL AS NEEDED FOR TEMPORARY RELIEF OF ITCHING DUE TO INSECT BITES AND RASHES. IF NO IMPROVEMENT IS NOTED WITHIN 48 HOURS NOTIFY NURSE 28.35 g 10    lisinopril (PRINIVIL,ZESTRIL) 5 MG tablet TAKE 1 TABLET BY MOUTH EVERY DAY IF BLOOD PRESSURE OVER 150/80 CAN GIVE ANOTHER 5MG TABLET AS NEEDED (RUN 28 FOR DISPILL, WILL ORDER 2ND DOSE PRN) 60 tablet 0    loperamide (IMODIUM) 2 MG capsule TAKE 2 CAPSULES BY MOUTH AT ONSET OF DIARRHEA THEN 1 CAPSULE AFTER EACH LOOSE STOOL AS NEEDED FOR MINOR DIARRHEA. MAX OF 8 DOSES PER 24 HOURS 30 capsule 10    loratadine (CLARITIN) 10 MG tablet TAKE 1 TABLET BY MOUTH EVERY DAY 30 tablet 0    methocarbamol (ROBAXIN) 500 MG tablet One tab 1-3x a day as needed 30 tablet 1     mupirocin (BACTROBAN) 2 % ointment APPLY TO RIGHT 5TH TOE TWICE DAILY      naproxen sodium (Aleve) 220 MG tablet One tab 2x a day for 5 days only with food 30 tablet 1    Neomycin-Bacitracin-Polymyxin (GNP TRIPLE ANTIBIOTIC) 3.5-400-5000 ointment FOLLOW DIRECTIONS ON LABEL AS NEEDED TO PREVENT INFECTIONS IN MINOR CUTS AND SCRAPES. IF NO IMPROVEMENT IN 48 HOURS NOTIFY NURSE 28.4 g 12    nitrofurantoin (Macrodantin) 100 MG capsule Take 1 capsule by mouth 2 (Two) Times a Day. 14 capsule 0    omeprazole (priLOSEC) 20 MG capsule TAKE 1 CAPSULE BY MOUTH EVERY DAY AS NEEDED ( BUBBLE ) 30 capsule 0    OXcarbazepine (TRILEPTAL) 600 MG tablet TAKE 1 TABLET BY MOUTH EVERY MORNING AND 2 TABLETS EVERY EVENING 90 tablet 9    PHENobarbital 97.2 MG tablet TAKE 1 TABLET BY MOUTH EVERY NIGHT AT BEDTIME (NARC SHEET) 90 tablet 3    polyethylene glycol (MiraLax) 17 GM/SCOOP powder Take 17 g by mouth Daily. Mix with water 17g daily with 8oz water prn 1 each 2    Psyllium (Metamucil [facility] Fiber) 55.46 % powder Take 1 teaspoon(s) by mouth 2 (Two) Times a Day. 1 tsp 1-2 x day as needed 1 g 1    risperiDONE (risperDAL) 2 MG tablet TAKE 1 TABLET BY MOUTH TWICE DAILY 56 tablet 5    Robafen 100 MG/5ML liquid       tamsulosin (FLOMAX) 0.4 MG capsule 24 hr capsule Take 1 capsule by mouth Daily.      tolnaftate (TINACTIN) 1 % external solution Apply twice a day to affected area until resolved 29.5 mL 2    white petrolatum gel gel Apply 1 application topically to the appropriate area as directed As Needed (Dry skin). Apply to hands at night and cover with cotton gloves overnight 212 g 2     No current facility-administered medications for this visit.         Review of Symptoms:    Psychiatric/Behavioral: Negative for agitation, behavioral problems, confusion, decreased concentration, dysphoric mood, hallucinations, self-injury, sleep disturbance and suicidal ideas. The patient is not nervous/anxious and is not hyperactive.        Physical  Exam:   There were no vitals taken for this visit.    Mental Status Exam:   Hygiene:   Good  Cooperation:  Cooperative  Eye Contact:  Good  Psychomotor Behavior:  Slow  Affect:  Appropriate  Mood: normal, smiling  Hopelessness: Denies  Speech:  Normal  Thought Process:  Goal directed  Thought Content:  Normal  Suicidal:  None  Homicidal:  None  Hallucinations:  None  Delusion:  None  Memory:  Deficits  Orientation:  Person, Place, Time and Situation  Reliability:  fair  Insight:  Fair  Judgement:  Fair  Impulse Control:  Good  Physical/Medical Issues:  No      Mental Status Exam was reviewed and compared to 9/12/24 visit and no updates were needed, the exam was the same.        PHQ-9 Depression Screening  Little interest or pleasure in doing things? Not at all   Feeling down, depressed, or hopeless? Not at all   PHQ-2 Total Score 0   Trouble falling or staying asleep, or sleeping too much? Not at all   Feeling tired or having little energy? Several days   Poor appetite or overeating? Not at all   Feeling bad about yourself - or that you are a failure or have let yourself or your family down? Not at all   Trouble concentrating on things, such as reading the newspaper or watching television? Not at all   Moving or speaking so slowly that other people could have noticed? Or the opposite - being so fidgety or restless that you have been moving around a lot more than usual? Not at all     Thoughts that you would be better off dead, or of hurting yourself in some way? Not at all   PHQ-9 Total Score 1   If you checked off any problems, how difficult have these problems made it for you to do your work, take care of things at home, or get along with other people? Not difficult at all               Never smoker    I advised Ray of the risks of tobacco use.     Lab Results:   Office Visit on 02/19/2025   Component Date Value Ref Range Status    Glucose 02/19/2025 88  70 - 99 mg/dL Final    BUN 02/19/2025 27  8 - 27 mg/dL Final     Creatinine 02/19/2025 1.23  0.76 - 1.27 mg/dL Final    EGFR Result 02/19/2025 65  >59 mL/min/1.73 Final    BUN/Creatinine Ratio 02/19/2025 22  10 - 24 Final    Sodium 02/19/2025 132 (L)  134 - 144 mmol/L Final    Potassium 02/19/2025 4.2  3.5 - 5.2 mmol/L Final    Chloride 02/19/2025 94 (L)  96 - 106 mmol/L Final    Total CO2 02/19/2025 25  20 - 29 mmol/L Final    Calcium 02/19/2025 9.5  8.6 - 10.2 mg/dL Final    Total Protein 02/19/2025 6.8  6.0 - 8.5 g/dL Final    Albumin 02/19/2025 4.1  3.9 - 4.9 g/dL Final    Globulin 02/19/2025 2.7  1.5 - 4.5 g/dL Final    Total Bilirubin 02/19/2025 <0.2  0.0 - 1.2 mg/dL Final    Alkaline Phosphatase 02/19/2025 201 (H)  44 - 121 IU/L Final    AST (SGOT) 02/19/2025 18  0 - 40 IU/L Final    ALT (SGPT) 02/19/2025 17  0 - 44 IU/L Final       Assessment & Plan   Problems Addressed this Visit          Mental Health    Generalized anxiety disorder (Chronic)    Schizophrenia - Primary (Chronic)       Neuro    Intellectual disability (Chronic)     Diagnoses         Codes Comments      Paranoid schizophrenia    -  Primary ICD-10-CM: F20.0  ICD-9-CM: 295.30       Generalized anxiety disorder     ICD-10-CM: F41.1  ICD-9-CM: 300.02       Intellectual disability     ICD-10-CM: F79  ICD-9-CM: 319             Visit Diagnoses:    ICD-10-CM ICD-9-CM   1. Paranoid schizophrenia  F20.0 295.30   2. Generalized anxiety disorder  F41.1 300.02   3. Intellectual disability  F79 319               TREATMENT PLAN/GOALS: Continue supportive psychotherapy efforts and medications as indicated. Treatment and medication options discussed during today's visit. Patient ackowledged and verbally consented to continue with current treatment plan and was educated on the importance of compliance with treatment and follow-up appointments.    MEDICATION ISSUES:  INSPECT reviewed as expected  Discussed medication options and treatment plan of prescribed medication as well as the risks, benefits, and side effects  including potential falls, possible impaired driving and metabolic adversities among others. Patient is agreeable to call the office with any worsening of symptoms or onset of side effects. Patient is agreeable to call 911 or go to the nearest ER should he/she begin having SI/HI. No medication side effects or related complaints today.     Patient continues to do well on Risperdal, did not do well with dosage reduction and back up to 2 mg tabs.    Continue Risperdal 2 mg BID   Continue Trileptal prescribed by neurology (Dr, Seipel) for seizures, sees him annually  Bloodwork done by Dang Holder on 2/19/25      MEDS ORDERED DURING VISIT:  No orders of the defined types were placed in this encounter.      Return in about 6 months (around 9/12/2025) for video visit.      This document has been electronically signed by Krista Epstein PA-C  March 13, 2025 07:59 EDT    EMR Dragon transcription disclaimer:  Some of this encounter note is an electronic transcription translation of spoken language to printed text. The electronic translation of spoken language may permit erroneous, or at times, nonsensical words or phrases to be inadvertently transcribed; Although I have reviewed the note for such errors some may still exist.

## 2025-03-20 ENCOUNTER — TELEPHONE (OUTPATIENT)
Dept: FAMILY MEDICINE CLINIC | Facility: CLINIC | Age: 65
End: 2025-03-20
Payer: MEDICARE

## 2025-03-20 NOTE — TELEPHONE ENCOUNTER
PATIENT IS ON THE [employer] AND DOESN'T HAVE DIABETES CAN YOU CHECK AND SEE IF HE NEEDS TO BE TAKEN OFF THAT. THANK YOU   DISPLAY PLAN FREE TEXT

## 2025-03-24 RX ORDER — FERROUS SULFATE 325(65) MG
1 TABLET ORAL EVERY 12 HOURS SCHEDULED
Qty: 60 TABLET | Refills: 0 | Status: SHIPPED | OUTPATIENT
Start: 2025-03-24

## 2025-03-24 RX ORDER — FAMOTIDINE 20 MG/1
20 TABLET, FILM COATED ORAL EVERY 12 HOURS SCHEDULED
Qty: 60 TABLET | Refills: 0 | Status: SHIPPED | OUTPATIENT
Start: 2025-03-24

## 2025-03-24 RX ORDER — FENOFIBRATE 48 MG/1
48 TABLET, COATED ORAL
Qty: 30 TABLET | Refills: 0 | Status: SHIPPED | OUTPATIENT
Start: 2025-03-24

## 2025-03-24 RX ORDER — ALENDRONATE SODIUM 70 MG/1
TABLET ORAL
Qty: 4 TABLET | Refills: 0 | Status: SHIPPED | OUTPATIENT
Start: 2025-03-24

## 2025-03-24 RX ORDER — LORATADINE 10 MG/1
10 TABLET ORAL DAILY
Qty: 30 TABLET | Refills: 0 | Status: SHIPPED | OUTPATIENT
Start: 2025-03-24

## 2025-03-24 RX ORDER — CALCIUM CARBONATE/VITAMIN D3 600 MG-10
1 TABLET ORAL EVERY 12 HOURS SCHEDULED
Qty: 60 TABLET | Refills: 0 | Status: SHIPPED | OUTPATIENT
Start: 2025-03-24

## 2025-04-01 RX ORDER — CHLORHEXIDINE GLUCONATE ORAL RINSE 1.2 MG/ML
SOLUTION DENTAL
Qty: 946 ML | Refills: 0 | Status: SHIPPED | OUTPATIENT
Start: 2025-04-01

## 2025-04-07 ENCOUNTER — OFFICE VISIT (OUTPATIENT)
Dept: FAMILY MEDICINE CLINIC | Facility: CLINIC | Age: 65
End: 2025-04-07
Payer: MEDICARE

## 2025-04-07 VITALS
WEIGHT: 153.4 LBS | DIASTOLIC BLOOD PRESSURE: 72 MMHG | HEIGHT: 67 IN | HEART RATE: 82 BPM | OXYGEN SATURATION: 96 % | SYSTOLIC BLOOD PRESSURE: 105 MMHG | TEMPERATURE: 97.7 F | BODY MASS INDEX: 24.08 KG/M2

## 2025-04-07 DIAGNOSIS — R29.6 FALLS FREQUENTLY: Primary | ICD-10-CM

## 2025-04-07 DIAGNOSIS — T14.8XXA SKIN ABRASION: ICD-10-CM

## 2025-04-07 RX ORDER — NAPROXEN SODIUM 220 MG/1
TABLET, FILM COATED ORAL
Qty: 60 TABLET | Refills: 1 | Status: SHIPPED | OUTPATIENT
Start: 2025-04-07

## 2025-04-07 NOTE — PROGRESS NOTES
Manuel Gaspar is a 64 y.o. male.     History of Present Illness  64-year-old white male lives in a group home with history of MR, anxiety, schizophrenia, osteoporosis, hypertension, kidney stone, chronic anemia, neurogenic bladder, hearing loss, chronic hyponatremia, chronic UTIs with recent left meniscus injury who comes in today with caregiver for follow-up visit    Blood pressure 104/72 heart rate 82 he denies any chest pain, dyspnea, tachycardia or dizziness    Patient has had 3 falls to his knees mainly tripping over shoestrings.  He had another fall during [employer] on hardwood floor.  He has an abrasion on right knee that has been cleansed antibiotic ointment and Band-Aid applied.  Left knee abrasion is not necessary to treat this very mild more like a bruising.  Facility is now double time patient shoestrings for him in the morning and I advised he might want to try getting patient slip on loafers.  Will place him on Aleve 220 mg twice daily for 7 days then as needed.  Cleanse the abrasion on right knee twice a day with soap and water and antibiotic ointment and Band-Aid needed    Weight is 153 with a BMI of 24.1.  He is up-to-date on all immunizations which are done at the facility, he is up-to-date on eye exams and he goes to urology for his PSAs.  Colonoscopies are done through Dr. Valentine's office and are up-to-date              Aleve 220 mg twice daily x 7 days then as needed  Double tied patient's shoestrings every morning or get sleep on loafers  Keep right knee abrasion cleaned with soap and water antibiotic ointment and Band-Aid if needed  Report any further falls       The following portions of the patient's history were reviewed and updated as appropriate: allergies, current medications, past family history, past medical history, past social history, past surgical history, and problem list.    Vitals:    04/07/25 1437   BP: 105/72   BP Location: Right arm   Patient Position: Sitting   Cuff  "Size: Adult   Pulse: 82   Temp: 97.7 °F (36.5 °C)   TempSrc: Infrared   SpO2: 96%   Weight: 69.6 kg (153 lb 6.4 oz)   Height: 170.2 cm (67.01\")       Past Medical History:   Diagnosis Date    Depression     Hearing loss     Hypertension     Kidney stone     Mental retardation     Neurogenic bladder     Psychiatric illness unknown    Psychosis unknown    Seizure disorder     onset 16-17 years old, last seizure about 2015 when weaning phenobarb     Past Surgical History:   Procedure Laterality Date    BLADDER REPAIR      CATARACT EXTRACTION      COLONOSCOPY  2019    ORIF PATELLA FRACTURE Left     THORACOTOMY      TYMPANOMASTOIDECTOMY       Family History   Family history unknown: Yes     Immunization History   Administered Date(s) Administered    COVID-19 (MODERNA) 1st,2nd,3rd Dose Monovalent 01/11/2021, 02/08/2021, 10/17/2021    COVID-19 (MODERNA) Monovalent Original Booster 11/24/2021, 08/18/2022    Flu Vaccine Intradermal Quad 18-64YR 10/05/2018, 11/19/2021    Flu Vaccine Quad PF 6-35MO 11/23/2016    Flu Vaccine Quad PF >36MO 11/23/2016, 11/12/2019    Fluzone  >6mos 10/26/2017    Fluzone (or Fluarix & Flulaval for VFC) >6mos 10/21/2020, 11/19/2021, 10/21/2022    Fluzone Quad >6mos (Multi-dose) 10/06/2015    H1N1 Inj 12/03/2009    Hepatitis A 05/23/2018, 11/16/2018    Influenza Inj MDCK Preserative Free 10/05/2018    Influenza Injectable Mdck Pf Quad 11/20/2019    Influenza Seasonal Injectable 10/06/2015, 03/14/2017    Influenza Whole 12/07/1999    Influenza, Unspecified 10/15/2024    PPD Test 11/06/2002, 11/06/2007, 11/10/2009, 11/12/2010, 11/15/2011, 11/27/2012, 12/03/2013, 11/05/2014, 11/03/2015, 01/04/2016, 07/12/2017, 06/13/2018, 06/05/2019, 10/20/2020, 10/19/2021, 11/01/2022, 01/23/2024, 03/18/2025    Td (TDVAX) 06/13/2014    Tdap 05/22/2009       Office Visit on 02/19/2025   Component Date Value Ref Range Status    Glucose 02/19/2025 88  70 - 99 mg/dL Final    BUN 02/19/2025 27  8 - 27 mg/dL Final    " Creatinine 02/19/2025 1.23  0.76 - 1.27 mg/dL Final    EGFR Result 02/19/2025 65  >59 mL/min/1.73 Final    BUN/Creatinine Ratio 02/19/2025 22  10 - 24 Final    Sodium 02/19/2025 132 (L)  134 - 144 mmol/L Final    Potassium 02/19/2025 4.2  3.5 - 5.2 mmol/L Final    Chloride 02/19/2025 94 (L)  96 - 106 mmol/L Final    Total CO2 02/19/2025 25  20 - 29 mmol/L Final    Calcium 02/19/2025 9.5  8.6 - 10.2 mg/dL Final    Total Protein 02/19/2025 6.8  6.0 - 8.5 g/dL Final    Albumin 02/19/2025 4.1  3.9 - 4.9 g/dL Final    Globulin 02/19/2025 2.7  1.5 - 4.5 g/dL Final    Total Bilirubin 02/19/2025 <0.2  0.0 - 1.2 mg/dL Final    Alkaline Phosphatase 02/19/2025 201 (H)  44 - 121 IU/L Final    AST (SGOT) 02/19/2025 18  0 - 40 IU/L Final    ALT (SGPT) 02/19/2025 17  0 - 44 IU/L Final         Review of Systems   Constitutional: Negative.    HENT: Negative.     Respiratory: Negative.     Cardiovascular: Negative.    Gastrointestinal: Negative.    Genitourinary: Negative.    Musculoskeletal:         Bilateral knee pain   Skin:         Right knee abrasion   Neurological: Negative.    Psychiatric/Behavioral: Negative.         Objective   Physical Exam  Constitutional:       Appearance: Normal appearance.   HENT:      Head: Normocephalic.   Cardiovascular:      Rate and Rhythm: Normal rate and regular rhythm.      Pulses: Normal pulses.      Heart sounds: Normal heart sounds.   Pulmonary:      Effort: Pulmonary effort is normal.      Breath sounds: Normal breath sounds.   Abdominal:      General: Bowel sounds are normal.   Musculoskeletal:      Comments: Abrasion to right knee bruising to left knee patient ambulating without difficulty   Skin:     General: Skin is warm.   Neurological:      General: No focal deficit present.      Mental Status: He is alert and oriented to person, place, and time.   Psychiatric:         Mood and Affect: Mood normal.         Behavior: Behavior normal.         Procedures    Assessment & Plan   There are  no diagnoses linked to this encounter.       Current Outpatient Medications:     acetaminophen (TYLENOL) 325 MG tablet, TAKE 2 TABLETS BY MOUTH EVERY 4 HOURS AS NEEDED FOR FEVER AND PAIN ( MAX 12 DOSES IN 24 HOURS), Disp: 60 tablet, Rfl: 10    alendronate (FOSAMAX) 70 MG tablet, TAKE 1 TABLET BY MOUTH WEEKLY .TAKE WITH 8OZ WATER BEFORE ANY FOOD OR DRINK. DO NOT LIE DOWN FOR 30 MINUTES, Disp: 4 tablet, Rfl: 0    Allergy 4 MG tablet, TAKE 1 TABLET BY MOUTH 4 TO 6 HOURS AS NEEDED FOR ALLERGIES AND RUNNY NOSE, Disp: 24 tablet, Rfl: 0    Calcium + Vitamin D3 600-10 MG-MCG tablet per tablet, TAKE 1 TABLET BY MOUTH TWICE DAILY, Disp: 60 tablet, Rfl: 0    chlorhexidine (PERIDEX) 0.12 % solution, SWISH AND SPIT WITH A 1/2 OUNCE TWICE DAILY - [place], Disp: 946 mL, Rfl: 0    Colloidal Oatmeal (Aveeno Eczema Therapy) 1 % cream, Apply to dry skin 3 times a day, Disp: 354 each, Rfl: 5    Combigan 0.2-0.5 % ophthalmic solution, , Disp: , Rfl:     COVID-19 At Home Antigen Test (QuickVue At-Home Covid-19 Test) kit, USE AS NEEDED FOR COVID 19 SYMPTOMS, Disp: 2 kit, Rfl: 2    dexAMETHasone (DECADRON) 0.1 % ophthalmic solution, , Disp: , Rfl:     DOCUSIL 100 MG capsule, TAKE ONE (1) CAPSULE BY MOUTH TWICE A DAY, Disp: 60 capsule, Rfl: 3    famotidine (PEPCID) 20 MG tablet, TAKE 1 TABLET BY MOUTH TWICE DAILY, Disp: 60 tablet, Rfl: 0    fenofibrate (TRICOR) 48 MG tablet, TAKE 1 TABLET BY MOUTH AT BEDTIME, Disp: 30 tablet, Rfl: 0    FeroSul 325 (65 Fe) MG tablet, TAKE 1 TABLET BY MOUTH TWICE DAILY, Disp: 60 tablet, Rfl: 0    GNP Antacid Regular Strength 200-200-20 MG/5ML suspension, , Disp: , Rfl:     GNP Milk of Magnesia 1200 MG/15ML suspension, TAKE 2 TABLESPOONS (30ML) BY MOUTH UP TO 4 X DAILY AS NEEDED FOR CONSTIPATION. NOTIFY NURSE IF NO BOWEL MOVEMENT FOR 6 CONSECUTIVE SHIFTS, Disp: 355 mL, Rfl: 10    hydrocortisone 2.5 % cream, FOLLOW DIRECTIONS ON LABEL AS NEEDED FOR TEMPORARY RELIEF OF ITCHING DUE TO INSECT BITES AND RASHES.  IF NO IMPROVEMENT IS NOTED WITHIN 48 HOURS NOTIFY NURSE, Disp: 28.35 g, Rfl: 10    lisinopril (PRINIVIL,ZESTRIL) 5 MG tablet, TAKE 1 TABLET BY MOUTH EVERY DAY IF BLOOD PRESSURE OVER 150/80 CAN GIVE ANOTHER 5MG TABLET AS NEEDED (RUN 28 FOR DISPILL, WILL ORDER 2ND DOSE PRN), Disp: 60 tablet, Rfl: 0    loperamide (IMODIUM) 2 MG capsule, TAKE 2 CAPSULES BY MOUTH AT ONSET OF DIARRHEA THEN 1 CAPSULE AFTER EACH LOOSE STOOL AS NEEDED FOR MINOR DIARRHEA. MAX OF 8 DOSES PER 24 HOURS, Disp: 30 capsule, Rfl: 10    loratadine (CLARITIN) 10 MG tablet, TAKE 1 TABLET BY MOUTH EVERY DAY, Disp: 30 tablet, Rfl: 0    methocarbamol (ROBAXIN) 500 MG tablet, One tab 1-3x a day as needed, Disp: 30 tablet, Rfl: 1    mupirocin (BACTROBAN) 2 % ointment, APPLY TO RIGHT 5TH TOE TWICE DAILY, Disp: , Rfl:     Neomycin-Bacitracin-Polymyxin (GNP TRIPLE ANTIBIOTIC) 3.5-400-5000 ointment, FOLLOW DIRECTIONS ON LABEL AS NEEDED TO PREVENT INFECTIONS IN MINOR CUTS AND SCRAPES. IF NO IMPROVEMENT IN 48 HOURS NOTIFY NURSE, Disp: 28.4 g, Rfl: 12    nitrofurantoin (Macrodantin) 100 MG capsule, Take 1 capsule by mouth 2 (Two) Times a Day., Disp: 14 capsule, Rfl: 0    omeprazole (priLOSEC) 20 MG capsule, TAKE 1 CAPSULE BY MOUTH EVERY DAY AS NEEDED ( BUBBLE ), Disp: 30 capsule, Rfl: 0    OXcarbazepine (TRILEPTAL) 600 MG tablet, TAKE 1 TABLET BY MOUTH EVERY MORNING AND 2 TABLETS EVERY EVENING, Disp: 90 tablet, Rfl: 9    PHENobarbital 97.2 MG tablet, TAKE 1 TABLET BY MOUTH EVERY NIGHT AT BEDTIME (NARC SHEET), Disp: 90 tablet, Rfl: 3    polyethylene glycol (MiraLax) 17 GM/SCOOP powder, Take 17 g by mouth Daily. Mix with water 17g daily with 8oz water prn, Disp: 1 each, Rfl: 2    Psyllium (Metamucil [facility] Fiber) 55.46 % powder, Take 1 teaspoon(s) by mouth 2 (Two) Times a Day. 1 tsp 1-2 x day as needed, Disp: 1 g, Rfl: 1    risperiDONE (risperDAL) 2 MG tablet, TAKE 1 TABLET BY MOUTH TWICE DAILY, Disp: 56 tablet, Rfl: 5    Robafen 100 MG/5ML liquid, , Disp: ,  Rfl:     tamsulosin (FLOMAX) 0.4 MG capsule 24 hr capsule, Take 1 capsule by mouth Daily., Disp: , Rfl:     tolnaftate (TINACTIN) 1 % external solution, Apply twice a day to affected area until resolved, Disp: 29.5 mL, Rfl: 2    white petrolatum gel gel, Apply 1 application topically to the appropriate area as directed As Needed (Dry skin). Apply to hands at night and cover with cotton gloves overnight, Disp: 212 g, Rfl: 2    naproxen sodium (Aleve) 220 MG tablet, One tab twice a day for 7 days then bid as needed for minor pain, Disp: 60 tablet, Rfl: 1           Dang Holder, HORTENCIA 4/7/2025 15:08 EDT  This note has been electronically signed

## 2025-04-07 NOTE — PATIENT INSTRUCTIONS
Aleve 220 mg twice daily x 7 days then as needed  Double tied patient's shoestrings every morning or get sleep on loafers  Keep right knee abrasion cleaned with soap and water antibiotic ointment and Band-Aid if needed  Report any further falls

## 2025-04-09 LAB
BASOPHILS # BLD AUTO: 0.1 X10E3/UL (ref 0–0.2)
BASOPHILS NFR BLD AUTO: 1 %
EOSINOPHIL # BLD AUTO: 0.5 X10E3/UL (ref 0–0.4)
EOSINOPHIL NFR BLD AUTO: 7 %
ERYTHROCYTE [DISTWIDTH] IN BLOOD BY AUTOMATED COUNT: 11.9 % (ref 11.6–15.4)
HCT VFR BLD AUTO: 34.4 % (ref 37.5–51)
HGB BLD-MCNC: 11.6 G/DL (ref 13–17.7)
IMM GRANULOCYTES # BLD AUTO: 0 X10E3/UL (ref 0–0.1)
IMM GRANULOCYTES NFR BLD AUTO: 0 %
LYMPHOCYTES # BLD AUTO: 0.7 X10E3/UL (ref 0.7–3.1)
LYMPHOCYTES NFR BLD AUTO: 10 %
MCH RBC QN AUTO: 32.7 PG (ref 26.6–33)
MCHC RBC AUTO-ENTMCNC: 33.7 G/DL (ref 31.5–35.7)
MCV RBC AUTO: 97 FL (ref 79–97)
MONOCYTES # BLD AUTO: 0.8 X10E3/UL (ref 0.1–0.9)
MONOCYTES NFR BLD AUTO: 12 %
NEUTROPHILS # BLD AUTO: 5 X10E3/UL (ref 1.4–7)
NEUTROPHILS NFR BLD AUTO: 70 %
PLATELET # BLD AUTO: 215 X10E3/UL (ref 150–450)
RBC # BLD AUTO: 3.55 X10E6/UL (ref 4.14–5.8)
WBC # BLD AUTO: 7.1 X10E3/UL (ref 3.4–10.8)

## 2025-04-19 RX ORDER — FAMOTIDINE 20 MG/1
20 TABLET, FILM COATED ORAL EVERY 12 HOURS SCHEDULED
Qty: 60 TABLET | Refills: 0 | Status: SHIPPED | OUTPATIENT
Start: 2025-04-19

## 2025-04-19 RX ORDER — FENOFIBRATE 48 MG/1
48 TABLET, COATED ORAL
Qty: 30 TABLET | Refills: 0 | Status: SHIPPED | OUTPATIENT
Start: 2025-04-19

## 2025-04-19 RX ORDER — FERROUS SULFATE 325(65) MG
1 TABLET ORAL EVERY 12 HOURS SCHEDULED
Qty: 60 TABLET | Refills: 0 | Status: SHIPPED | OUTPATIENT
Start: 2025-04-19

## 2025-04-19 RX ORDER — LORATADINE 10 MG/1
10 TABLET ORAL DAILY
Qty: 30 TABLET | Refills: 0 | Status: SHIPPED | OUTPATIENT
Start: 2025-04-19

## 2025-04-19 RX ORDER — ALENDRONATE SODIUM 70 MG/1
TABLET ORAL
Qty: 4 TABLET | Refills: 0 | Status: SHIPPED | OUTPATIENT
Start: 2025-04-19

## 2025-04-19 RX ORDER — CALCIUM CARBONATE/VITAMIN D3 600 MG-10
1 TABLET ORAL EVERY 12 HOURS SCHEDULED
Qty: 60 TABLET | Refills: 0 | Status: SHIPPED | OUTPATIENT
Start: 2025-04-19

## 2025-04-19 RX ORDER — LISINOPRIL 5 MG/1
TABLET ORAL
Qty: 60 TABLET | Refills: 0 | Status: SHIPPED | OUTPATIENT
Start: 2025-04-19

## 2025-05-05 ENCOUNTER — TELEPHONE (OUTPATIENT)
Dept: FAMILY MEDICINE CLINIC | Facility: CLINIC | Age: 65
End: 2025-05-05
Payer: MEDICARE

## 2025-05-05 DIAGNOSIS — Z20.5 EXPOSURE TO HEPATITIS A: Primary | ICD-10-CM

## 2025-05-05 NOTE — TELEPHONE ENCOUNTER
I spoke with Cecile today and she was wanting to know about getting the guys check for Hep A one of the guys that are in the group home has it and the familles are wanting to know what is happing and what needs to be don

## 2025-05-07 DIAGNOSIS — Z20.5 EXPOSURE TO HEPATITIS A: Primary | ICD-10-CM

## 2025-05-07 DIAGNOSIS — R94.5 ABNORMAL RESULTS OF LIVER FUNCTION STUDIES: ICD-10-CM

## 2025-05-07 LAB — HAV AB SER QL IA: POSITIVE

## 2025-05-14 ENCOUNTER — TELEPHONE (OUTPATIENT)
Dept: PSYCHIATRY | Facility: CLINIC | Age: 65
End: 2025-05-14
Payer: MEDICARE

## 2025-05-14 NOTE — TELEPHONE ENCOUNTER
Care giver called and asked if the provider had received the Medication Reduction form?  Care giver states they need to have is filled out by the provider and sent back so they can have it on file.  Asked the care giver to email a copy over just in case the provider did not receive them when the care giver emailed them previously. Once they are received will email them to the provider.

## 2025-05-19 NOTE — TELEPHONE ENCOUNTER
This has been sent back to the pt care giver and they are aware it has been sent.  A copy will be placed in the pt chart.

## 2025-05-22 DIAGNOSIS — J30.2 SEASONAL ALLERGIES: Primary | ICD-10-CM

## 2025-05-22 DIAGNOSIS — E61.1 IRON DEFICIENCY: ICD-10-CM

## 2025-05-22 DIAGNOSIS — K21.9 GASTROESOPHAGEAL REFLUX DISEASE WITHOUT ESOPHAGITIS: ICD-10-CM

## 2025-05-22 RX ORDER — FAMOTIDINE 20 MG/1
20 TABLET, FILM COATED ORAL EVERY 12 HOURS SCHEDULED
Qty: 60 TABLET | Refills: 0 | Status: SHIPPED | OUTPATIENT
Start: 2025-05-22

## 2025-05-22 RX ORDER — ALENDRONATE SODIUM 70 MG/1
70 TABLET ORAL
Qty: 4 TABLET | Refills: 0 | Status: SHIPPED | OUTPATIENT
Start: 2025-05-22

## 2025-05-22 RX ORDER — FERROUS SULFATE 325(65) MG
1 TABLET ORAL EVERY 12 HOURS SCHEDULED
Qty: 60 TABLET | Refills: 0 | Status: SHIPPED | OUTPATIENT
Start: 2025-05-22

## 2025-05-22 RX ORDER — FENOFIBRATE 48 MG/1
48 TABLET, FILM COATED ORAL
Qty: 30 TABLET | Refills: 0 | Status: SHIPPED | OUTPATIENT
Start: 2025-05-22

## 2025-05-22 RX ORDER — LORATADINE 10 MG/1
10 TABLET ORAL DAILY
Qty: 30 TABLET | Refills: 0 | Status: SHIPPED | OUTPATIENT
Start: 2025-05-22

## 2025-05-22 RX ORDER — CALCIUM CARBONATE/VITAMIN D3 600 MG-10
1 TABLET ORAL EVERY 12 HOURS SCHEDULED
Qty: 60 TABLET | Refills: 0 | Status: SHIPPED | OUTPATIENT
Start: 2025-05-22

## 2025-05-22 RX ORDER — CHLORHEXIDINE GLUCONATE ORAL RINSE 1.2 MG/ML
SOLUTION DENTAL
Qty: 946 ML | Refills: 0 | Status: SHIPPED | OUTPATIENT
Start: 2025-05-22

## 2025-05-22 NOTE — TELEPHONE ENCOUNTER
Caller: [place], [place] - [address] [place] [address] - [phone] PH - [phone] FX    Relationship: Pharmacy    Best call back number: [phone]     Requested Prescriptions:   Requested Prescriptions     Pending Prescriptions Disp Refills    ferrous sulfate (FeroSul) 325 (65 FE) MG tablet 60 tablet 0     Sig: Take 1 tablet by mouth Every 12 (Twelve) Hours.    calcium carb-cholecalciferol (Calcium + Vitamin D3) 600-10 MG-MCG tablet per tablet 60 tablet 0     Sig: Take 1 tablet by mouth Every 12 (Twelve) Hours.    fenofibrate (TRICOR) 48 MG tablet 30 tablet 0     Sig: Take 1 tablet by mouth every night at bedtime.    loratadine (CLARITIN) 10 MG tablet 30 tablet 0     Sig: Take 1 tablet by mouth Daily.    alendronate (FOSAMAX) 70 MG tablet 4 tablet 0    famotidine (PEPCID) 20 MG tablet 60 tablet 0     Sig: Take 1 tablet by mouth Every 12 (Twelve) Hours.        Pharmacy where request should be sent: [facility], [place] - [address] [place] [id number] [place], [place] [phone] FX     Last office visit with prescribing clinician: 4/7/2025   Last telemedicine visit with prescribing clinician: Visit date not found   Next office visit with prescribing clinician: Visit date not found     Additional details provided by patient: PATIENT NEEDS REFILLS ON THESE MEDICATIONS.     Does the patient have less than a 3 day supply:  [] Yes  [x] No    Would you like a call back once the refill request has been completed: [] Yes [x] No    If the office needs to give you a call back, can they leave a voicemail: [] Yes [x] No    Jhonatan Becerril Rep   05/22/25 08:38 EDT

## 2025-05-22 NOTE — TELEPHONE ENCOUNTER
I show that these were already sent and pharmacy received     Thank you!   

## 2025-05-30 ENCOUNTER — OFFICE VISIT (OUTPATIENT)
Dept: FAMILY MEDICINE CLINIC | Facility: CLINIC | Age: 65
End: 2025-05-30
Payer: MEDICARE

## 2025-05-30 VITALS
HEIGHT: 67 IN | SYSTOLIC BLOOD PRESSURE: 99 MMHG | OXYGEN SATURATION: 94 % | DIASTOLIC BLOOD PRESSURE: 66 MMHG | WEIGHT: 152 LBS | HEART RATE: 80 BPM | BODY MASS INDEX: 23.86 KG/M2 | TEMPERATURE: 97.1 F

## 2025-05-30 DIAGNOSIS — R35.0 FREQUENT URINATION: Primary | ICD-10-CM

## 2025-05-30 DIAGNOSIS — R31.9 URINARY TRACT INFECTION WITH HEMATURIA, SITE UNSPECIFIED: ICD-10-CM

## 2025-05-30 DIAGNOSIS — Z76.89 ENCOUNTER TO ESTABLISH CARE: ICD-10-CM

## 2025-05-30 DIAGNOSIS — I10 HYPERTENSION, BENIGN: Chronic | ICD-10-CM

## 2025-05-30 DIAGNOSIS — N39.0 URINARY TRACT INFECTION WITH HEMATURIA, SITE UNSPECIFIED: ICD-10-CM

## 2025-05-30 DIAGNOSIS — I95.0 IDIOPATHIC HYPOTENSION: Chronic | ICD-10-CM

## 2025-05-30 LAB
BILIRUB BLD-MCNC: NEGATIVE MG/DL
CLARITY, POC: ABNORMAL
COLOR UR: YELLOW
EXPIRATION DATE: ABNORMAL
GLUCOSE UR STRIP-MCNC: NEGATIVE MG/DL
KETONES UR QL: NEGATIVE
LEUKOCYTE EST, POC: ABNORMAL
Lab: ABNORMAL
NITRITE UR-MCNC: NEGATIVE MG/ML
PH UR: 8.5 [PH] (ref 5–8)
PROT UR STRIP-MCNC: ABNORMAL MG/DL
RBC # UR STRIP: ABNORMAL /UL
SP GR UR: 1.01 (ref 1–1.03)
UROBILINOGEN UR QL: NORMAL

## 2025-05-30 PROCEDURE — 1160F RVW MEDS BY RX/DR IN RCRD: CPT | Performed by: PHYSICIAN ASSISTANT

## 2025-05-30 PROCEDURE — 1126F AMNT PAIN NOTED NONE PRSNT: CPT | Performed by: PHYSICIAN ASSISTANT

## 2025-05-30 PROCEDURE — 99214 OFFICE O/P EST MOD 30 MIN: CPT | Performed by: PHYSICIAN ASSISTANT

## 2025-05-30 PROCEDURE — 81003 URINALYSIS AUTO W/O SCOPE: CPT | Performed by: PHYSICIAN ASSISTANT

## 2025-05-30 PROCEDURE — 3074F SYST BP LT 130 MM HG: CPT | Performed by: PHYSICIAN ASSISTANT

## 2025-05-30 PROCEDURE — 3078F DIAST BP <80 MM HG: CPT | Performed by: PHYSICIAN ASSISTANT

## 2025-05-30 PROCEDURE — 1159F MED LIST DOCD IN RCRD: CPT | Performed by: PHYSICIAN ASSISTANT

## 2025-05-30 RX ORDER — NITROFURANTOIN MACROCRYSTALS 100 MG/1
100 CAPSULE ORAL 2 TIMES DAILY
Qty: 14 CAPSULE | Refills: 0 | Status: SHIPPED | OUTPATIENT
Start: 2025-05-30 | End: 2025-05-30

## 2025-05-30 RX ORDER — NITROFURANTOIN MACROCRYSTALS 100 MG/1
100 CAPSULE ORAL 2 TIMES DAILY
Qty: 14 CAPSULE | Refills: 0 | Status: SHIPPED | OUTPATIENT
Start: 2025-05-30

## 2025-05-30 NOTE — PROGRESS NOTES
Chief Complaint  Establish Care, Hypertension, and Urinary Frequency    Subjective        Manuel Gaspar presents to [facility]    History of Present Illness  The patient presents for evaluation of urinary issues, blood pressure management    History is reported by another person in the presence of the patient.    He has been experiencing urinary disturbances, with 3 episodes of nocturia reported last night. He has a known history of recurrent urinary tract infections (UTIs) and is under the care of a urologist. His annual urology follow-up is scheduled for 10/2025. He has been on nitrofurantoin for his UTIs. It is noted that he does not exhibit typical symptoms of UTIs, such as pain or discomfort. He is not fasting today and is due for lab work next month.     He also follows up with neurology annually and has an upcoming appointment He is under the care of behavioral health, with biannual visits. He reports no auditory hallucinations or visual disturbances. He recently had a hearing examination, which showed stability compared to the previous year. However, there are concerns about the functionality of his hearing aids as he often does not respond to verbal cues. He has a history of ear infections and requires frequent visits to the audiologist, approximately 3 times a year, for cerumen removal and follow-up on ear infections. His last visit was on 05/02/2025. He also has annual ophthalmology appointments, with the next one scheduled for 06/17/2025 with Dr. Calvin.     His blood pressure readings have been inconsistent, with some elevated values despite being on lisinopril 5 mg. His blood pressure is monitored daily before medication administration, and the medication is held if the systolic reading is below 120. He receives his lisinopril dose at 6:30 AM.    He has a past medical history of constipation but currently maintains regular bowel movements. Approximately 2 weeks ago, he  "reported a 2-week period without bowel movement, which was inaccurate, according to the home health aide. he was administered milk of magnesia, resulting in successful defecation. He was previously on polyethylene glycol daily, but this was discontinued due to regular bowel movements.    Results  Labs   - Urine test: Presence of leukocytes, high pH, protein, and blood    History of Present Illness    Objective   Vital Signs:  BP 99/66 (BP Location: Left arm, Patient Position: Sitting, Cuff Size: Adult)   Pulse 80   Temp 97.1 °F (36.2 °C) (Infrared)   Ht 170.2 cm (67.01\")   Wt 68.9 kg (152 lb)   SpO2 94%   BMI 23.80 kg/m²   Estimated body mass index is 23.8 kg/m² as calculated from the following:    Height as of this encounter: 170.2 cm (67.01\").    Weight as of this encounter: 68.9 kg (152 lb).    BMI is within normal parameters. No other follow-up for BMI required.      Review of Systems   Genitourinary:  Positive for frequency.   All other systems reviewed and are negative.       Physical Exam  Vitals reviewed.   Constitutional:       Comments: Patient is moderately mentally disabled   Cardiovascular:      Rate and Rhythm: Normal rate and regular rhythm.      Pulses: Normal pulses.      Heart sounds: Normal heart sounds.   Pulmonary:      Effort: Pulmonary effort is normal.      Breath sounds: Normal breath sounds.   Skin:     General: Skin is warm and dry.   Neurological:      Mental Status: Mental status is at baseline.          Physical Exam  Respiratory: Clear to auscultation, no wheezing, rales or rhonchi    Result Review :                Assessment and Plan   Diagnoses and all orders for this visit:    1. Frequent urination (Primary)  Comments:  Adequate duration including water juices Adry's  Orders:  -     POC Urinalysis Dipstick, Automated  -     Urine Culture - Urine, Urine, Clean Catch  -     Discontinue: nitrofurantoin (Macrodantin) 100 MG capsule; Take 1 capsule by mouth 2 (Two) Times a Day. "  Dispense: 14 capsule; Refill: 0  -     nitrofurantoin (Macrodantin) 100 MG capsule; Take 1 capsule by mouth 2 (Two) Times a Day.  Dispense: 14 capsule; Refill: 0    2. Urinary tract infection with hematuria, site unspecified  Comments:  Adequate duration including water cranberry juices Gatorade's  Drop urine sample by next week for testing  Orders:  -     Discontinue: nitrofurantoin (Macrodantin) 100 MG capsule; Take 1 capsule by mouth 2 (Two) Times a Day.  Dispense: 14 capsule; Refill: 0  -     nitrofurantoin (Macrodantin) 100 MG capsule; Take 1 capsule by mouth 2 (Two) Times a Day.  Dispense: 14 capsule; Refill: 0    3. Encounter to establish care  Comments:  Make an appointment for about 6 weeks for annual wellness visit and fasting blood work    4. Hypertension, benign  Comments:  Hydration as well as checking BP 2 hours after medication to see hello we are dropping    5. Idiopathic hypotension  Comments:  Monitoring BP after 2 hours of medication to see how low we drop             Assessment & Plan  1. Urinary Tract Infection (UTI).  - He has a history of frequent UTIs and has been on nitrofurantoin before.  - Current urinalysis shows leukocytes, high pH, protein, and blood.  - A urine culture will be sent to identify any specific bacteria. He will be started on nitrofurantoin, and the prescription will be sent to pharmacy. If the culture results indicate a different pathogen not covered by nitrofurantoin, the antibiotic will be adjusted accordingly.  - He is advised to increase fluid intake to at least 68 ounces per day and include cranberry juice in his diet to support urinary health.    2. Blood pressure management.  - His blood pressure readings have been consistently low, with a recent reading of 99/66 mmHg.  - He is currently on a minimal dose of lisinopril (5 mg).  - He is advised to monitor his blood pressure at home, taking readings before and 2 hours after medication administration. He should also  increase his fluid intake to at least 68 ounces per day and include Gatorade in his diet to help maintain electrolyte balance.    3. Constipation.  - He has a history of constipation but has been regular recently.  - He reported not having a bowel movement for two weeks, but this was inaccurate.  - Milk of magnesia was used when needed.    4. Follow-up.  - The patient will follow up in 6 weeks for his Medicare wellness visit and physical examination, during which fasting labs will be conducted.  - Blood pressure readings and hydration status will be monitored closely in the interim.      Follow Up   Return in about 6 weeks (around 7/11/2025) for Medicare Wellness, Annual physical W fasting labs.  Patient was given instructions and counseling regarding his condition or for health maintenance advice. Please see specific information pulled into the AVS if appropriate.         This note has been electronically signed.   Kristine Lazcano PA-C 15:08 EDT 05/30/25   Patient or patient representative verbalized consent for the use of Ambient Listening during the visit with  Kristine Lazcano PA-C for chart documentation. 5/30/2025  15:06 EDT    The encounter note is created with the use of AI technology.  I do apologize if there are typos and/or confusion within the note.  Please feel free to contact me or my office with any questions or concerns.

## 2025-06-01 LAB
BACTERIA UR CULT: NORMAL
BACTERIA UR CULT: NORMAL

## 2025-06-02 ENCOUNTER — OFFICE VISIT (OUTPATIENT)
Dept: NEUROLOGY | Facility: CLINIC | Age: 65
End: 2025-06-02
Payer: MEDICARE

## 2025-06-02 VITALS
BODY MASS INDEX: 23.86 KG/M2 | HEIGHT: 67 IN | WEIGHT: 152 LBS | DIASTOLIC BLOOD PRESSURE: 58 MMHG | HEART RATE: 72 BPM | SYSTOLIC BLOOD PRESSURE: 99 MMHG

## 2025-06-02 DIAGNOSIS — G40.109 SEIZURE, TEMPORAL LOBE: ICD-10-CM

## 2025-06-02 PROCEDURE — 1159F MED LIST DOCD IN RCRD: CPT | Performed by: NURSE PRACTITIONER

## 2025-06-02 PROCEDURE — 1160F RVW MEDS BY RX/DR IN RCRD: CPT | Performed by: NURSE PRACTITIONER

## 2025-06-02 PROCEDURE — 3078F DIAST BP <80 MM HG: CPT | Performed by: NURSE PRACTITIONER

## 2025-06-02 PROCEDURE — 3074F SYST BP LT 130 MM HG: CPT | Performed by: NURSE PRACTITIONER

## 2025-06-02 PROCEDURE — 99213 OFFICE O/P EST LOW 20 MIN: CPT | Performed by: NURSE PRACTITIONER

## 2025-06-02 RX ORDER — PHENOBARBITAL 97.2 MG/1
TABLET ORAL
Qty: 90 TABLET | Refills: 3 | Status: SHIPPED | OUTPATIENT
Start: 2025-06-02

## 2025-06-02 RX ORDER — OXCARBAZEPINE 600 MG/1
TABLET, FILM COATED ORAL
Qty: 270 TABLET | Refills: 3 | Status: SHIPPED | OUTPATIENT
Start: 2025-06-02

## 2025-06-04 ENCOUNTER — RESULTS FOLLOW-UP (OUTPATIENT)
Dept: FAMILY MEDICINE CLINIC | Facility: CLINIC | Age: 65
End: 2025-06-04
Payer: MEDICARE

## 2025-06-04 LAB
BACTERIA UR CULT: ABNORMAL
BACTERIA UR CULT: ABNORMAL

## 2025-06-04 NOTE — TELEPHONE ENCOUNTER
Hub relay      Could you reach out to this his caregiver and let them know that his urine culture did come back positive for growth and that the medication that we started him on for his UTI should cover the specific bacteria     Thank you

## 2025-06-04 NOTE — PROGRESS NOTES
Could you reach out to this his caregiver and let them know that his urine culture did come back positive for growth and that the medication that we started him on for his UTI should cover the specific bacteria    Thank you

## 2025-06-05 NOTE — TELEPHONE ENCOUNTER
[facility]    Relationship: Emergency Contact    Best Callback Number: [phone]     HUB PROVIDED THE RELAY MESSAGE FROM THE OFFICE   PATIENT VOICED UNDERSTANDING AND HAS NO FURTHER QUESTIONS AT THIS TIME    ADDITIONAL INFORMATION:

## 2025-06-10 DIAGNOSIS — R35.0 URINARY FREQUENCY: Primary | ICD-10-CM

## 2025-06-12 LAB
APPEARANCE UR: ABNORMAL
BACTERIA #/AREA URNS HPF: ABNORMAL /[HPF]
BACTERIA UR CULT: NORMAL
BACTERIA UR CULT: NORMAL
BILIRUB UR QL STRIP: NEGATIVE
CASTS URNS QL MICRO: ABNORMAL /LPF
COLOR UR: YELLOW
CRYSTALS URNS MICRO: ABNORMAL
EPI CELLS #/AREA URNS HPF: ABNORMAL /HPF (ref 0–10)
GLUCOSE UR QL STRIP: NEGATIVE
HGB UR QL STRIP: ABNORMAL
KETONES UR QL STRIP: NEGATIVE
LEUKOCYTE ESTERASE UR QL STRIP: ABNORMAL
MICRO URNS: ABNORMAL
MUCOUS THREADS URNS QL MICRO: PRESENT
NITRITE UR QL STRIP: NEGATIVE
PH UR STRIP: 8 [PH] (ref 5–7.5)
PROT UR QL STRIP: ABNORMAL
RBC #/AREA URNS HPF: ABNORMAL /HPF (ref 0–2)
SP GR UR STRIP: 1.01 (ref 1–1.03)
UNIDENT CRYS URNS QL MICRO: PRESENT
URINALYSIS REFLEX: ABNORMAL
UROBILINOGEN UR STRIP-MCNC: 0.2 MG/DL (ref 0.2–1)
WBC #/AREA URNS HPF: >30 /HPF (ref 0–5)

## 2025-06-13 DIAGNOSIS — E78.00 HIGH CHOLESTEROL: ICD-10-CM

## 2025-06-13 DIAGNOSIS — J30.2 SEASONAL ALLERGIES: ICD-10-CM

## 2025-06-13 DIAGNOSIS — K21.9 GASTROESOPHAGEAL REFLUX DISEASE WITHOUT ESOPHAGITIS: ICD-10-CM

## 2025-06-13 DIAGNOSIS — E61.1 IRON DEFICIENCY: ICD-10-CM

## 2025-06-13 DIAGNOSIS — I10 HYPERTENSION, BENIGN: Primary | ICD-10-CM

## 2025-06-13 DIAGNOSIS — M81.8 OTHER OSTEOPOROSIS WITHOUT CURRENT PATHOLOGICAL FRACTURE: ICD-10-CM

## 2025-06-13 RX ORDER — FENOFIBRATE 48 MG/1
48 TABLET, FILM COATED ORAL
Qty: 30 TABLET | Refills: 0 | OUTPATIENT
Start: 2025-06-13

## 2025-06-13 RX ORDER — CALCIUM CARBONATE/VITAMIN D3 600 MG-10
1 TABLET ORAL EVERY 12 HOURS SCHEDULED
Qty: 200 TABLET | Refills: 0 | Status: SHIPPED | OUTPATIENT
Start: 2025-06-13

## 2025-06-13 RX ORDER — CALCIUM CARBONATE/VITAMIN D3 600 MG-10
1 TABLET ORAL EVERY 12 HOURS SCHEDULED
Qty: 60 TABLET | Refills: 0 | OUTPATIENT
Start: 2025-06-13

## 2025-06-13 RX ORDER — FERROUS SULFATE 325(65) MG
1 TABLET ORAL EVERY 12 HOURS SCHEDULED
Qty: 60 TABLET | Refills: 0 | Status: SHIPPED | OUTPATIENT
Start: 2025-06-13

## 2025-06-13 RX ORDER — FERROUS SULFATE 325(65) MG
1 TABLET ORAL EVERY 12 HOURS SCHEDULED
Qty: 60 TABLET | Refills: 0 | OUTPATIENT
Start: 2025-06-13

## 2025-06-13 RX ORDER — LORATADINE 10 MG/1
10 TABLET ORAL DAILY
Qty: 30 TABLET | Refills: 0 | Status: SHIPPED | OUTPATIENT
Start: 2025-06-13

## 2025-06-13 RX ORDER — FAMOTIDINE 20 MG/1
20 TABLET, FILM COATED ORAL EVERY 12 HOURS SCHEDULED
Qty: 60 TABLET | Refills: 0 | OUTPATIENT
Start: 2025-06-13

## 2025-06-13 RX ORDER — LORATADINE 10 MG/1
10 TABLET ORAL DAILY
Qty: 30 TABLET | Refills: 0 | OUTPATIENT
Start: 2025-06-13

## 2025-06-13 RX ORDER — ALENDRONATE SODIUM 70 MG/1
TABLET ORAL
Qty: 4 TABLET | Refills: 0 | OUTPATIENT
Start: 2025-06-13

## 2025-06-13 RX ORDER — FAMOTIDINE 20 MG/1
20 TABLET, FILM COATED ORAL EVERY 12 HOURS SCHEDULED
Qty: 60 TABLET | Refills: 0 | Status: SHIPPED | OUTPATIENT
Start: 2025-06-13

## 2025-06-13 RX ORDER — LISINOPRIL 5 MG/1
TABLET ORAL
Qty: 60 TABLET | Refills: 0 | Status: SHIPPED | OUTPATIENT
Start: 2025-06-13

## 2025-06-13 RX ORDER — FENOFIBRATE 48 MG/1
48 TABLET, FILM COATED ORAL
Qty: 30 TABLET | Refills: 0 | Status: SHIPPED | OUTPATIENT
Start: 2025-06-13

## 2025-06-13 RX ORDER — LISINOPRIL 5 MG/1
TABLET ORAL
Qty: 60 TABLET | Refills: 0 | OUTPATIENT
Start: 2025-06-13

## 2025-06-13 RX ORDER — ALENDRONATE SODIUM 70 MG/1
70 TABLET ORAL
Qty: 4 TABLET | Refills: 0 | Status: SHIPPED | OUTPATIENT
Start: 2025-06-13

## 2025-06-16 ENCOUNTER — RESULTS FOLLOW-UP (OUTPATIENT)
Dept: FAMILY MEDICINE CLINIC | Facility: CLINIC | Age: 65
End: 2025-06-16
Payer: MEDICARE

## 2025-06-16 NOTE — PROGRESS NOTES
Could you reach out to him or his caregiver and let them know that his urine is about the same that it was couple weeks ago see if he is having any symptoms at all and if he completed his medication culture showed no growth of bacteria.  See when his next visit with urology is. Make sure he is hydrating well.     Thank you!

## 2025-06-17 ENCOUNTER — TELEPHONE (OUTPATIENT)
Dept: FAMILY MEDICINE CLINIC | Facility: CLINIC | Age: 65
End: 2025-06-17
Payer: MEDICARE

## 2025-06-17 NOTE — PROGRESS NOTES
SPOKE WITH SHANDRA, VERBALLY UNDERSTANDS, SHE STATES HE IS STILL URINATING A LOT, HE FINISHED ALL MEDS AND APPT WITH UROLOGY IS NOT TILL OCTOBER, BUT SHE IS GOING TO CALL THEM AND SEE IF HE CAN GET IN SOONER.

## 2025-06-17 NOTE — TELEPHONE ENCOUNTER
[facility]    Relationship: Emergency Contact    Best call back number: [phone]    Who are you requesting to speak with (clinical staff, provider,  specific staff member): CLINICAL STAFF    What was the call regarding: STATED THAT THEY HAVE SEEN THE RESULTS OF THE URINALYSIS THEY SUBMITTED ON 6/10/25 AND IT CAME BACK ABNORMAL EVEN WITH THE ANTIBIOTIC IN THE PATIENT'S SYSTEM. STATED THAT THEY WOULD LIKE TO KNOW IF THE ANTIBIOTIC IS GOING TO BE RE-PRESCRIBED OR IF A DIFFERENT ONE IS GOING TO BE GIVEN. PLEASE CALL AND ADVISE

## 2025-06-19 LAB
BASOPHILS # BLD AUTO: 0 X10E3/UL (ref 0–0.2)
BASOPHILS NFR BLD AUTO: 1 %
EOSINOPHIL # BLD AUTO: 0.2 X10E3/UL (ref 0–0.4)
EOSINOPHIL NFR BLD AUTO: 4 %
ERYTHROCYTE [DISTWIDTH] IN BLOOD BY AUTOMATED COUNT: 12.4 % (ref 11.6–15.4)
HCT VFR BLD AUTO: 33.4 % (ref 37.5–51)
HGB BLD-MCNC: 10.9 G/DL (ref 13–17.7)
IMM GRANULOCYTES # BLD AUTO: 0 X10E3/UL (ref 0–0.1)
IMM GRANULOCYTES NFR BLD AUTO: 0 %
LYMPHOCYTES # BLD AUTO: 1 X10E3/UL (ref 0.7–3.1)
LYMPHOCYTES NFR BLD AUTO: 17 %
MCH RBC QN AUTO: 32.2 PG (ref 26.6–33)
MCHC RBC AUTO-ENTMCNC: 32.6 G/DL (ref 31.5–35.7)
MCV RBC AUTO: 99 FL (ref 79–97)
MONOCYTES # BLD AUTO: 0.6 X10E3/UL (ref 0.1–0.9)
MONOCYTES NFR BLD AUTO: 10 %
NEUTROPHILS # BLD AUTO: 3.9 X10E3/UL (ref 1.4–7)
NEUTROPHILS NFR BLD AUTO: 68 %
PLATELET # BLD AUTO: 225 X10E3/UL (ref 150–450)
RBC # BLD AUTO: 3.39 X10E6/UL (ref 4.14–5.8)
WBC # BLD AUTO: 5.8 X10E3/UL (ref 3.4–10.8)

## 2025-06-20 ENCOUNTER — TELEPHONE (OUTPATIENT)
Dept: FAMILY MEDICINE CLINIC | Facility: CLINIC | Age: 65
End: 2025-06-20
Payer: MEDICARE

## 2025-06-20 DIAGNOSIS — Z91.89 POOR ORAL HYGIENE: Primary | ICD-10-CM

## 2025-06-20 RX ORDER — CHLORHEXIDINE GLUCONATE ORAL RINSE 1.2 MG/ML
15 SOLUTION DENTAL 2 TIMES DAILY
Qty: 946 ML | Refills: 2 | Status: SHIPPED | OUTPATIENT
Start: 2025-06-20

## 2025-06-20 RX ORDER — CHLORHEXIDINE GLUCONATE ORAL RINSE 1.2 MG/ML
SOLUTION DENTAL
Qty: 946 ML | Refills: 0 | OUTPATIENT
Start: 2025-06-20

## 2025-07-02 ENCOUNTER — TELEPHONE (OUTPATIENT)
Dept: FAMILY MEDICINE CLINIC | Facility: CLINIC | Age: 65
End: 2025-07-02
Payer: MEDICARE

## 2025-07-02 DIAGNOSIS — R35.0 URINARY FREQUENCY: ICD-10-CM

## 2025-07-02 DIAGNOSIS — R79.89 SERUM CREATININE RAISED: ICD-10-CM

## 2025-07-02 DIAGNOSIS — N40.0 ENLARGED PROSTATE WITHOUT LOWER URINARY TRACT SYMPTOMS (LUTS): ICD-10-CM

## 2025-07-02 DIAGNOSIS — R30.0 DIFFICULT OR PAINFUL URINATION: Primary | ICD-10-CM

## 2025-07-02 DIAGNOSIS — E87.1 HYPONATREMIA: ICD-10-CM

## 2025-07-02 NOTE — TELEPHONE ENCOUNTER
Caller: [facility] [place]    Relationship:     Best call back number: [phone]     What is the best time to reach you: ANYTIME    Who are you requesting to speak with (clinical staff, provider,  specific staff member): CLINICAL    What was the call regarding: [facility] IS CALLING TO REQUEST THE OFFICE SEND ABNORMAL URINE ANALYSIS AND URINE CULTURES TO [facility] IN [place] TO DR. BUNN    Is it okay if the provider responds through MyChart: NO

## 2025-07-02 NOTE — TELEPHONE ENCOUNTER
Cecile called back and stated that Dr. Will was extremely unprofessional this morning trying to argue with her and asking questions that did not pertain to the patient.   They waited an hour to see him and while waiting she took a call.  He came in the room while she was on the phone and she got up and walked out so that she could finish the call and not interrupt him in the room.  When she came back in he was gone and she asked the nurse where he was and was told that he left when she did and said he would be back.  He came back in and started questioning her about the call and would not continue the visit with Manuel.  He kept asking if she wanted to leave and she kept saying no, just please do the visit with Manuel and he just kept asking her questions about the call and arguing with her.  She finally left with Manuel and would like to a referral to a new urologist.

## 2025-07-02 NOTE — TELEPHONE ENCOUNTER
Can we see what is going on with this? Who he is currently seeing and why the change and if they have a preference where? In my notes I see that he sees urology every year.     Thank you!

## 2025-07-02 NOTE — TELEPHONE ENCOUNTER
HUB RELAY    Can we see what is going on with this? Who he is currently seeing and why the change and if they have a preference where? In my notes I see that he sees urology every year.

## 2025-07-02 NOTE — TELEPHONE ENCOUNTER
[facility]    Relationship: Emergency Contact    Best call back number:     [phone] (Work)       What is the medical concern/diagnosis: ABNORMAL URINALYSIS AT LAST VISIT     What specialty or service is being requested: NEW UROLOGY REFERRAL ASAP     What is the provider, practice or medical service name: [facility] PLEASE        HE IS NEEDING SOMETHING FOR THIS ISSUE PLEASE / CAN YOU CALL IN SOMETHING OR DOES HE NEED TO BE SEEN AGAIN

## 2025-07-07 ENCOUNTER — TELEPHONE (OUTPATIENT)
Dept: FAMILY MEDICINE CLINIC | Facility: CLINIC | Age: 65
End: 2025-07-07

## 2025-07-07 DIAGNOSIS — R30.0 DIFFICULT OR PAINFUL URINATION: ICD-10-CM

## 2025-07-07 DIAGNOSIS — R35.0 URINARY FREQUENCY: ICD-10-CM

## 2025-07-07 DIAGNOSIS — N40.0 ENLARGED PROSTATE WITHOUT LOWER URINARY TRACT SYMPTOMS (LUTS): Primary | ICD-10-CM

## 2025-07-14 DIAGNOSIS — M81.8 OTHER OSTEOPOROSIS WITHOUT CURRENT PATHOLOGICAL FRACTURE: ICD-10-CM

## 2025-07-14 DIAGNOSIS — E78.00 HIGH CHOLESTEROL: ICD-10-CM

## 2025-07-14 DIAGNOSIS — K21.9 GASTROESOPHAGEAL REFLUX DISEASE WITHOUT ESOPHAGITIS: ICD-10-CM

## 2025-07-14 DIAGNOSIS — E61.1 IRON DEFICIENCY: ICD-10-CM

## 2025-07-14 DIAGNOSIS — J30.2 SEASONAL ALLERGIES: ICD-10-CM

## 2025-07-14 DIAGNOSIS — I10 HYPERTENSION, BENIGN: ICD-10-CM

## 2025-07-15 RX ORDER — FERROUS SULFATE 325(65) MG
1 TABLET ORAL EVERY 12 HOURS SCHEDULED
Qty: 60 TABLET | Refills: 0 | Status: SHIPPED | OUTPATIENT
Start: 2025-07-15

## 2025-07-15 RX ORDER — ALENDRONATE SODIUM 70 MG/1
70 TABLET ORAL
Qty: 4 TABLET | Refills: 0 | Status: SHIPPED | OUTPATIENT
Start: 2025-07-15

## 2025-07-15 RX ORDER — LISINOPRIL 5 MG/1
TABLET ORAL
Qty: 60 TABLET | Refills: 0 | Status: SHIPPED | OUTPATIENT
Start: 2025-07-15

## 2025-07-15 RX ORDER — FAMOTIDINE 20 MG/1
20 TABLET, FILM COATED ORAL EVERY 12 HOURS SCHEDULED
Qty: 60 TABLET | Refills: 0 | Status: SHIPPED | OUTPATIENT
Start: 2025-07-15

## 2025-07-15 RX ORDER — CALCIUM CARBONATE/VITAMIN D3 600 MG-10
1 TABLET ORAL EVERY 12 HOURS SCHEDULED
Qty: 200 TABLET | Refills: 0 | Status: SHIPPED | OUTPATIENT
Start: 2025-07-15

## 2025-07-15 RX ORDER — LORATADINE 10 MG/1
10 TABLET ORAL DAILY
Qty: 30 TABLET | Refills: 0 | Status: SHIPPED | OUTPATIENT
Start: 2025-07-15

## 2025-07-15 RX ORDER — FENOFIBRATE 48 MG/1
48 TABLET, FILM COATED ORAL
Qty: 30 TABLET | Refills: 0 | Status: SHIPPED | OUTPATIENT
Start: 2025-07-15

## 2025-07-18 ENCOUNTER — OFFICE VISIT (OUTPATIENT)
Dept: FAMILY MEDICINE CLINIC | Facility: CLINIC | Age: 65
End: 2025-07-18
Payer: MEDICARE

## 2025-07-18 VITALS
DIASTOLIC BLOOD PRESSURE: 69 MMHG | TEMPERATURE: 97 F | SYSTOLIC BLOOD PRESSURE: 109 MMHG | WEIGHT: 149 LBS | OXYGEN SATURATION: 98 % | HEART RATE: 69 BPM | HEIGHT: 67 IN | BODY MASS INDEX: 23.39 KG/M2

## 2025-07-18 DIAGNOSIS — N39.0 URINARY TRACT INFECTION WITH HEMATURIA, SITE UNSPECIFIED: Primary | ICD-10-CM

## 2025-07-18 DIAGNOSIS — R35.0 FREQUENT URINATION: ICD-10-CM

## 2025-07-18 DIAGNOSIS — R31.9 URINARY TRACT INFECTION WITH HEMATURIA, SITE UNSPECIFIED: Primary | ICD-10-CM

## 2025-07-18 LAB
BILIRUB BLD-MCNC: NEGATIVE MG/DL
CLARITY, POC: ABNORMAL
COLOR UR: YELLOW
EXPIRATION DATE: ABNORMAL
GLUCOSE UR STRIP-MCNC: NEGATIVE MG/DL
KETONES UR QL: NEGATIVE
LEUKOCYTE EST, POC: ABNORMAL
Lab: ABNORMAL
NITRITE UR-MCNC: NEGATIVE MG/ML
PH UR: 7 [PH] (ref 5–8)
PROT UR STRIP-MCNC: ABNORMAL MG/DL
RBC # UR STRIP: ABNORMAL /UL
SP GR UR: 1.01 (ref 1–1.03)
UROBILINOGEN UR QL: NORMAL

## 2025-07-18 PROCEDURE — 1159F MED LIST DOCD IN RCRD: CPT | Performed by: PHYSICIAN ASSISTANT

## 2025-07-18 PROCEDURE — 99214 OFFICE O/P EST MOD 30 MIN: CPT | Performed by: PHYSICIAN ASSISTANT

## 2025-07-18 PROCEDURE — 1160F RVW MEDS BY RX/DR IN RCRD: CPT | Performed by: PHYSICIAN ASSISTANT

## 2025-07-18 PROCEDURE — 3074F SYST BP LT 130 MM HG: CPT | Performed by: PHYSICIAN ASSISTANT

## 2025-07-18 PROCEDURE — 3078F DIAST BP <80 MM HG: CPT | Performed by: PHYSICIAN ASSISTANT

## 2025-07-18 PROCEDURE — 1126F AMNT PAIN NOTED NONE PRSNT: CPT | Performed by: PHYSICIAN ASSISTANT

## 2025-07-18 PROCEDURE — 81003 URINALYSIS AUTO W/O SCOPE: CPT | Performed by: PHYSICIAN ASSISTANT

## 2025-07-18 RX ORDER — NITROFURANTOIN MACROCRYSTALS 100 MG/1
100 CAPSULE ORAL 2 TIMES DAILY
Qty: 14 CAPSULE | Refills: 0 | Status: SHIPPED | OUTPATIENT
Start: 2025-07-18

## 2025-07-18 NOTE — PROGRESS NOTES
"Chief Complaint  Urinary Frequency    Subjective        Manuel Gaspar presents to [facility]    History of Present Illness  The patient presents for an annual wellness visit.    He has been experiencing recurrent urinary tract infections (UTIs) that have not resolved. He is not currently on any routine medication for UTIs. Last year, his urinalysis results were not abnormal, but some klever was present. The urologist considered these results normal and did not prescribe any medication. Years ago, he required daily injections at [facility] for 5 days to treat a UTI. Additionally, there was an incident where metal was found in his urethra, likely related to past harm. He has an upcoming appointment with urology on 07/30/2025.    He has a high pain tolerance and often does not report pain. Typically, he wakes up three times during the night, and any increase in this pattern is noted by the staff. Last year, he sustained a knee injury that caused him to limp for a day, but he did not complain of pain.    His blood pressure readings have been fluctuating, with systolic values ranging from the 140s to 160s. His blood pressure medication is held when his systolic blood pressure is 120 or below.    He appears to be dehydrated, as indicated by a recent dehydration test conducted by the nurse. Efforts are being made to increase his water intake, and he has replaced Sprite with Gatorade to help retain some of the water he consumes.    Results  Labs   - Urine test: Presence of leukocytes, protein, and blood    Urinary Frequency  Associated symptoms: frequency        Objective   Vital Signs:  /69 (BP Location: Right arm, Patient Position: Sitting, Cuff Size: Adult)   Pulse 69   Temp 97 °F (36.1 °C) (Infrared)   Ht 170.2 cm (67.01\")   Wt 67.6 kg (149 lb)   SpO2 98%   BMI 23.33 kg/m²   Estimated body mass index is 23.33 kg/m² as calculated from the following:    Height as of this " "encounter: 170.2 cm (67.01\").    Weight as of this encounter: 67.6 kg (149 lb).    BMI is within normal parameters. No other follow-up for BMI required.      Review of Systems   Genitourinary:  Positive for frequency.   All other systems reviewed and are negative.       Physical Exam  Vitals reviewed.   Cardiovascular:      Rate and Rhythm: Normal rate and regular rhythm.      Pulses: Normal pulses.      Heart sounds: Normal heart sounds.   Pulmonary:      Effort: Pulmonary effort is normal.      Breath sounds: Normal breath sounds.   Genitourinary:     Comments: Physical exam done today urinalysis was done culture will be sent off  Skin:     General: Skin is warm and dry.   Neurological:      Mental Status: He is alert. Mental status is at baseline.   Psychiatric:      Comments: Patient has developmental delays he is at his baseline for him          Physical Exam  Respiratory: Clear to auscultation, no wheezing, rales or rhonchi  Cardiovascular: Regular rate and rhythm, no murmurs, rubs, or gallops    Result Review :                Assessment and Plan   Diagnoses and all orders for this visit:    1. Frequent urination (Primary)  -     POC Urinalysis Dipstick, Automated  -     nitrofurantoin (Macrodantin) 100 MG capsule; Take 1 capsule by mouth 2 (Two) Times a Day.  Dispense: 14 capsule; Refill: 0  -     Urine Culture - Urine, Urine, Clean Catch    2. Frequent urination  Comments:  Adequate duration including water juices Gatorade's  Orders:  -     POC Urinalysis Dipstick, Automated  -     nitrofurantoin (Macrodantin) 100 MG capsule; Take 1 capsule by mouth 2 (Two) Times a Day.  Dispense: 14 capsule; Refill: 0  -     Urine Culture - Urine, Urine, Clean Catch    3. Urinary tract infection with hematuria, site unspecified  Comments:  Adequate duration including water cranberry juices Gatorade's  Drop urine sample by next week for testing  Orders:  -     nitrofurantoin (Macrodantin) 100 MG capsule; Take 1 capsule by " mouth 2 (Two) Times a Day.  Dispense: 14 capsule; Refill: 0             Assessment & Plan  1. Urinary Tract Infection (UTI).  - Urine sample today is cloudy and contains leukocytes, protein, and blood.  - Previous urinalysis showed typically some klever, which was deemed normal by urology.  - Prescription for nitrofurantoin, to be taken twice daily, has been sent to pharmacy.  - Advised to discuss the possibility of a maintenance dose with urologist during the appointment on 07/30/2025.    2. Hypertension.  - Blood pressure readings have been fluctuating, with occasional spikes into the 140s and 160s.  - Physical exam findings include a blood pressure reading of 100/64 on one occasion.  - Advised to continue monitoring blood pressure and hold medication on days when systolic reading is 120 or below.  - Encouraged to maintain hydration to help manage blood pressure.    3. Dehydration.  - Recent assessment by the nurse indicates possible dehydration.  - He has replaced Sprite with Gatorade to retain more water.  - Encouraged to increase fluid intake, including water and Gatorade.  - Nurse performed a dehydration test last night, confirming mild dehydration.    4. Health Maintenance.  - Annual physical examination will be scheduled for the end of August 2025.  - Discussion with staff about the necessity of Medicare wellness portion.  - Verification of coverage for annual physical and potential removal of unnecessary wellness check from chart.  - Regular visits every 90 days or more frequent if needed.      Follow Up   Return in about 1 month (around 8/18/2025) for Annual physical.  Patient was given instructions and counseling regarding his condition or for health maintenance advice. Please see specific information pulled into the AVS if appropriate.         This note has been electronically signed.   Kristine Lazcano PA-C 09:44 EDT 07/18/25   Patient or patient representative verbalized consent for the use of Ambient  Listening during the visit with  Kristine Lazcano PA-C for chart documentation. 7/18/2025  09:49 EDT    The encounter note is created with the use of AI technology.  I do apologize if there are typos and/or confusion within the note.  Please feel free to contact me or my office with any questions or concerns.

## 2025-07-20 ENCOUNTER — RESULTS FOLLOW-UP (OUTPATIENT)
Dept: FAMILY MEDICINE CLINIC | Facility: CLINIC | Age: 65
End: 2025-07-20
Payer: MEDICARE

## 2025-07-20 LAB
BACTERIA UR CULT: NO GROWTH
BACTERIA UR CULT: NORMAL

## 2025-07-20 NOTE — PROGRESS NOTES
Please let the caregiver know that no growth was found continue on current treatment    Thank you!

## 2025-07-21 NOTE — TELEPHONE ENCOUNTER
Hub relay    Please let the caregiver know that no growth was found continue on current treatment

## 2025-07-28 NOTE — TELEPHONE ENCOUNTER
Name:     [facility] () [phone] (Mobile)         HUB PROVIDED THE RELAY MESSAGE FROM THE OFFICE      PATIENT: VOICED UNDERSTANDING AND HAS NO FURTHER QUESTIONS AT THIS TIME    ADDITIONAL INFORMATION:

## 2025-07-30 ENCOUNTER — TELEPHONE (OUTPATIENT)
Dept: FAMILY MEDICINE CLINIC | Facility: CLINIC | Age: 65
End: 2025-07-30
Payer: MEDICARE

## 2025-07-30 NOTE — TELEPHONE ENCOUNTER
Caller: [facility]    Relationship to patient:     Best call back number: [phone]     Patient is needing: REQUESTING ALL FINAL UA'S FOR THE PAST YEAR TO BE FAXED TO THEIR OFFICE. FAX# [phone]

## 2025-08-08 DIAGNOSIS — E78.00 HIGH CHOLESTEROL: ICD-10-CM

## 2025-08-08 DIAGNOSIS — K21.9 GASTROESOPHAGEAL REFLUX DISEASE WITHOUT ESOPHAGITIS: ICD-10-CM

## 2025-08-08 DIAGNOSIS — J30.2 SEASONAL ALLERGIES: ICD-10-CM

## 2025-08-08 DIAGNOSIS — E61.1 IRON DEFICIENCY: ICD-10-CM

## 2025-08-08 DIAGNOSIS — M81.8 OTHER OSTEOPOROSIS WITHOUT CURRENT PATHOLOGICAL FRACTURE: ICD-10-CM

## 2025-08-08 DIAGNOSIS — N40.0 BENIGN PROSTATIC HYPERPLASIA, UNSPECIFIED WHETHER LOWER URINARY TRACT SYMPTOMS PRESENT: Primary | ICD-10-CM

## 2025-08-08 RX ORDER — FENOFIBRATE 48 MG/1
48 TABLET, FILM COATED ORAL
Qty: 30 TABLET | Refills: 0 | Status: SHIPPED | OUTPATIENT
Start: 2025-08-08

## 2025-08-08 RX ORDER — ALENDRONATE SODIUM 70 MG/1
70 TABLET ORAL
Qty: 4 TABLET | Refills: 0 | Status: SHIPPED | OUTPATIENT
Start: 2025-08-08

## 2025-08-08 RX ORDER — FERROUS SULFATE 325(65) MG
1 TABLET ORAL EVERY 12 HOURS SCHEDULED
Qty: 60 TABLET | Refills: 0 | Status: SHIPPED | OUTPATIENT
Start: 2025-08-08

## 2025-08-08 RX ORDER — LORATADINE 10 MG/1
10 TABLET ORAL DAILY
Qty: 30 TABLET | Refills: 0 | Status: SHIPPED | OUTPATIENT
Start: 2025-08-08

## 2025-08-08 RX ORDER — TAMSULOSIN HYDROCHLORIDE 0.4 MG/1
1 CAPSULE ORAL DAILY
Qty: 30 CAPSULE | Refills: 0 | Status: SHIPPED | OUTPATIENT
Start: 2025-08-08

## 2025-08-08 RX ORDER — FAMOTIDINE 20 MG/1
20 TABLET, FILM COATED ORAL EVERY 12 HOURS SCHEDULED
Qty: 60 TABLET | Refills: 0 | Status: SHIPPED | OUTPATIENT
Start: 2025-08-08

## 2025-08-12 ENCOUNTER — OFFICE VISIT (OUTPATIENT)
Dept: ONCOLOGY | Facility: CLINIC | Age: 65
End: 2025-08-12
Payer: MEDICARE

## 2025-08-12 VITALS
WEIGHT: 151 LBS | HEIGHT: 67 IN | BODY MASS INDEX: 23.7 KG/M2 | HEART RATE: 80 BPM | TEMPERATURE: 98.4 F | OXYGEN SATURATION: 99 %

## 2025-08-12 DIAGNOSIS — D64.9 ANEMIA, UNSPECIFIED TYPE: Primary | ICD-10-CM

## 2025-08-12 RX ORDER — CARBOXYMETHYLCELLULOSE SODIUM 10 MG/ML
GEL OPHTHALMIC
COMMUNITY
Start: 2025-07-21

## 2025-08-13 ENCOUNTER — OFFICE VISIT (OUTPATIENT)
Dept: FAMILY MEDICINE CLINIC | Facility: CLINIC | Age: 65
End: 2025-08-13
Payer: MEDICARE

## 2025-08-13 ENCOUNTER — TELEPHONE (OUTPATIENT)
Dept: PSYCHIATRY | Facility: CLINIC | Age: 65
End: 2025-08-13
Payer: MEDICARE

## 2025-08-13 VITALS
HEIGHT: 67 IN | DIASTOLIC BLOOD PRESSURE: 74 MMHG | WEIGHT: 149 LBS | OXYGEN SATURATION: 97 % | TEMPERATURE: 97.8 F | HEART RATE: 61 BPM | SYSTOLIC BLOOD PRESSURE: 120 MMHG | BODY MASS INDEX: 23.39 KG/M2

## 2025-08-13 DIAGNOSIS — Z00.00 ANNUAL PHYSICAL EXAM: ICD-10-CM

## 2025-08-13 DIAGNOSIS — F79 INTELLECTUAL DISABILITY: Chronic | ICD-10-CM

## 2025-08-13 DIAGNOSIS — Z12.11 ENCOUNTER FOR SCREENING FOR MALIGNANT NEOPLASM OF COLON: ICD-10-CM

## 2025-08-13 DIAGNOSIS — I10 HYPERTENSION, BENIGN: ICD-10-CM

## 2025-08-13 DIAGNOSIS — E78.00 HIGH CHOLESTEROL: ICD-10-CM

## 2025-08-13 DIAGNOSIS — D63.1 ANEMIA DUE TO CHRONIC KIDNEY DISEASE, UNSPECIFIED CKD STAGE: ICD-10-CM

## 2025-08-13 DIAGNOSIS — N18.9 ANEMIA DUE TO CHRONIC KIDNEY DISEASE, UNSPECIFIED CKD STAGE: ICD-10-CM

## 2025-08-13 DIAGNOSIS — Z00.00 ENCOUNTER FOR PREVENTIVE CARE: ICD-10-CM

## 2025-08-13 DIAGNOSIS — Z00.00 MEDICARE ANNUAL WELLNESS VISIT, SUBSEQUENT: Primary | ICD-10-CM

## 2025-08-13 RX ORDER — ASPIRIN 81 MG/1
81 TABLET ORAL DAILY
Qty: 90 TABLET | Refills: 0 | Status: CANCELLED | OUTPATIENT
Start: 2025-08-13

## 2025-08-14 LAB
ALBUMIN SERPL-MCNC: 4.1 G/DL (ref 3.9–4.9)
ALP SERPL-CCNC: 72 IU/L (ref 44–121)
ALT SERPL-CCNC: 14 IU/L (ref 0–44)
AST SERPL-CCNC: 16 IU/L (ref 0–40)
BASOPHILS # BLD AUTO: 0.1 X10E3/UL (ref 0–0.2)
BASOPHILS NFR BLD AUTO: 1 %
BILIRUB SERPL-MCNC: 0.3 MG/DL (ref 0–1.2)
BUN SERPL-MCNC: 28 MG/DL (ref 8–27)
BUN/CREAT SERPL: 22 (ref 10–24)
CALCIUM SERPL-MCNC: 9.6 MG/DL (ref 8.6–10.2)
CHLORIDE SERPL-SCNC: 98 MMOL/L (ref 96–106)
CHOLEST SERPL-MCNC: 211 MG/DL (ref 100–199)
CO2 SERPL-SCNC: 22 MMOL/L (ref 20–29)
CREAT SERPL-MCNC: 1.27 MG/DL (ref 0.76–1.27)
EGFRCR SERPLBLD CKD-EPI 2021: 62 ML/MIN/1.73
EOSINOPHIL # BLD AUTO: 0.2 X10E3/UL (ref 0–0.4)
EOSINOPHIL NFR BLD AUTO: 3 %
ERYTHROCYTE [DISTWIDTH] IN BLOOD BY AUTOMATED COUNT: 12.5 % (ref 11.6–15.4)
GLOBULIN SER CALC-MCNC: 2.6 G/DL (ref 1.5–4.5)
GLUCOSE SERPL-MCNC: 91 MG/DL (ref 70–99)
HCT VFR BLD AUTO: 36.9 % (ref 37.5–51)
HDLC SERPL-MCNC: 64 MG/DL
HGB BLD-MCNC: 11.8 G/DL (ref 13–17.7)
IMM GRANULOCYTES # BLD AUTO: 0 X10E3/UL (ref 0–0.1)
IMM GRANULOCYTES NFR BLD AUTO: 0 %
LDLC SERPL CALC-MCNC: 130 MG/DL (ref 0–99)
LYMPHOCYTES # BLD AUTO: 0.7 X10E3/UL (ref 0.7–3.1)
LYMPHOCYTES NFR BLD AUTO: 12 %
MCH RBC QN AUTO: 32.6 PG (ref 26.6–33)
MCHC RBC AUTO-ENTMCNC: 32 G/DL (ref 31.5–35.7)
MCV RBC AUTO: 102 FL (ref 79–97)
MONOCYTES # BLD AUTO: 0.6 X10E3/UL (ref 0.1–0.9)
MONOCYTES NFR BLD AUTO: 11 %
NEUTROPHILS # BLD AUTO: 4.3 X10E3/UL (ref 1.4–7)
NEUTROPHILS NFR BLD AUTO: 73 %
PLATELET # BLD AUTO: 206 X10E3/UL (ref 150–450)
POTASSIUM SERPL-SCNC: 4.2 MMOL/L (ref 3.5–5.2)
PROT SERPL-MCNC: 6.7 G/DL (ref 6–8.5)
RBC # BLD AUTO: 3.62 X10E6/UL (ref 4.14–5.8)
SODIUM SERPL-SCNC: 134 MMOL/L (ref 134–144)
T3FREE SERPL-MCNC: 2.4 PG/ML (ref 2–4.4)
T4 FREE SERPL-MCNC: 0.82 NG/DL (ref 0.82–1.77)
TRIGL SERPL-MCNC: 96 MG/DL (ref 0–149)
TSH SERPL DL<=0.005 MIU/L-ACNC: 1.33 UIU/ML (ref 0.45–4.5)
VLDLC SERPL CALC-MCNC: 17 MG/DL (ref 5–40)
WBC # BLD AUTO: 5.9 X10E3/UL (ref 3.4–10.8)

## 2025-08-18 DIAGNOSIS — F20.0 PARANOID SCHIZOPHRENIA: ICD-10-CM

## 2025-08-18 RX ORDER — RISPERIDONE 2 MG/1
2 TABLET ORAL 2 TIMES DAILY
Qty: 56 TABLET | Refills: 2 | Status: SHIPPED | OUTPATIENT
Start: 2025-08-18